# Patient Record
Sex: FEMALE | Race: BLACK OR AFRICAN AMERICAN | NOT HISPANIC OR LATINO | Employment: FULL TIME | URBAN - METROPOLITAN AREA
[De-identification: names, ages, dates, MRNs, and addresses within clinical notes are randomized per-mention and may not be internally consistent; named-entity substitution may affect disease eponyms.]

---

## 2017-01-01 ENCOUNTER — HOSPITAL ENCOUNTER (INPATIENT)
Facility: HOSPITAL | Age: 82
LOS: 9 days | DRG: 388 | End: 2017-06-20
Attending: EMERGENCY MEDICINE
Payer: MEDICARE

## 2017-01-01 VITALS
SYSTOLIC BLOOD PRESSURE: 136 MMHG | WEIGHT: 158.75 LBS | HEIGHT: 64 IN | HEART RATE: 82 BPM | RESPIRATION RATE: 25 BRPM | BODY MASS INDEX: 27.1 KG/M2 | OXYGEN SATURATION: 98 % | DIASTOLIC BLOOD PRESSURE: 63 MMHG | TEMPERATURE: 100 F

## 2017-01-01 DIAGNOSIS — R06.03 RESPIRATORY DISTRESS: ICD-10-CM

## 2017-01-01 DIAGNOSIS — R78.81 BACTEREMIA: ICD-10-CM

## 2017-01-01 DIAGNOSIS — N17.9 ACUTE RENAL FAILURE, UNSPECIFIED ACUTE RENAL FAILURE TYPE: ICD-10-CM

## 2017-01-01 DIAGNOSIS — I48.91 ATRIAL FIBRILLATION, NEW ONSET: ICD-10-CM

## 2017-01-01 DIAGNOSIS — R00.0 TACHYCARDIA: Primary | ICD-10-CM

## 2017-01-01 DIAGNOSIS — R52 PAIN, ACUTE: ICD-10-CM

## 2017-01-01 DIAGNOSIS — I50.9 HEART FAILURE: ICD-10-CM

## 2017-01-01 DIAGNOSIS — K56.609 SMALL BOWEL OBSTRUCTION: ICD-10-CM

## 2017-01-01 DIAGNOSIS — I63.9 CVA (CEREBRAL VASCULAR ACCIDENT): ICD-10-CM

## 2017-01-01 DIAGNOSIS — I50.30 (HFPEF) HEART FAILURE WITH PRESERVED EJECTION FRACTION: ICD-10-CM

## 2017-01-01 LAB
ALBUMIN SERPL BCP-MCNC: 1.9 G/DL
ALBUMIN SERPL BCP-MCNC: 1.9 G/DL
ALBUMIN SERPL BCP-MCNC: 2 G/DL
ALBUMIN SERPL BCP-MCNC: 2.1 G/DL
ALBUMIN SERPL BCP-MCNC: 2.1 G/DL
ALBUMIN SERPL BCP-MCNC: 2.3 G/DL
ALP SERPL-CCNC: 59 U/L
ALP SERPL-CCNC: 65 U/L
ALP SERPL-CCNC: 65 U/L
ALP SERPL-CCNC: 83 U/L
ALT SERPL W/O P-5'-P-CCNC: 13 U/L
ALT SERPL W/O P-5'-P-CCNC: 18 U/L
ALT SERPL W/O P-5'-P-CCNC: 9 U/L
ALT SERPL W/O P-5'-P-CCNC: 9 U/L
ANION GAP SERPL CALC-SCNC: 10 MMOL/L
ANION GAP SERPL CALC-SCNC: 11 MMOL/L
ANION GAP SERPL CALC-SCNC: 13 MMOL/L
ANION GAP SERPL CALC-SCNC: 15 MMOL/L
ANION GAP SERPL CALC-SCNC: 15 MMOL/L
ANION GAP SERPL CALC-SCNC: 18 MMOL/L
ANION GAP SERPL CALC-SCNC: 6 MMOL/L
ANION GAP SERPL CALC-SCNC: 7 MMOL/L
ANION GAP SERPL CALC-SCNC: 9 MMOL/L
ANION GAP SERPL CALC-SCNC: 9 MMOL/L
ANISOCYTOSIS BLD QL SMEAR: SLIGHT
AORTIC VALVE STENOSIS: ABNORMAL
AST SERPL-CCNC: 33 U/L
AST SERPL-CCNC: 33 U/L
AST SERPL-CCNC: 50 U/L
AST SERPL-CCNC: 73 U/L
BACTERIA #/AREA URNS HPF: ABNORMAL /HPF
BACTERIA BLD CULT: NORMAL
BACTERIA CATH TIP CULT: NO GROWTH
BACTERIA UR CULT: NO GROWTH
BASOPHILS # BLD AUTO: 0.01 K/UL
BASOPHILS # BLD AUTO: 0.03 K/UL
BASOPHILS # BLD AUTO: 0.04 K/UL
BASOPHILS # BLD AUTO: 0.05 K/UL
BASOPHILS NFR BLD: 0.1 %
BASOPHILS NFR BLD: 0.3 %
BASOPHILS NFR BLD: 0.4 %
BASOPHILS NFR BLD: 0.4 %
BILIRUB SERPL-MCNC: 0.4 MG/DL
BILIRUB SERPL-MCNC: 0.5 MG/DL
BILIRUB UR QL STRIP: ABNORMAL
BUN SERPL-MCNC: 26 MG/DL
BUN SERPL-MCNC: 26 MG/DL
BUN SERPL-MCNC: 30 MG/DL
BUN SERPL-MCNC: 32 MG/DL
BUN SERPL-MCNC: 32 MG/DL
BUN SERPL-MCNC: 35 MG/DL
BUN SERPL-MCNC: 35 MG/DL
BUN SERPL-MCNC: 39 MG/DL
BUN SERPL-MCNC: 44 MG/DL
BUN SERPL-MCNC: 48 MG/DL
BUN SERPL-MCNC: 50 MG/DL
BUN SERPL-MCNC: 50 MG/DL
CALCIUM SERPL-MCNC: 7.8 MG/DL
CALCIUM SERPL-MCNC: 8 MG/DL
CALCIUM SERPL-MCNC: 8.4 MG/DL
CALCIUM SERPL-MCNC: 8.7 MG/DL
CALCIUM SERPL-MCNC: 8.9 MG/DL
CALCIUM SERPL-MCNC: 8.9 MG/DL
CALCIUM SERPL-MCNC: 9.1 MG/DL
CALCIUM SERPL-MCNC: 9.6 MG/DL
CHLORIDE SERPL-SCNC: 111 MMOL/L
CHLORIDE SERPL-SCNC: 111 MMOL/L
CHLORIDE SERPL-SCNC: 112 MMOL/L
CHLORIDE SERPL-SCNC: 115 MMOL/L
CHLORIDE SERPL-SCNC: 116 MMOL/L
CHLORIDE SERPL-SCNC: 117 MMOL/L
CHLORIDE SERPL-SCNC: 117 MMOL/L
CHLORIDE SERPL-SCNC: 118 MMOL/L
CHLORIDE SERPL-SCNC: 120 MMOL/L
CHLORIDE SERPL-SCNC: 123 MMOL/L
CHOLEST/HDLC SERPL: 8.7 {RATIO}
CLARITY UR: ABNORMAL
CO2 SERPL-SCNC: 13 MMOL/L
CO2 SERPL-SCNC: 14 MMOL/L
CO2 SERPL-SCNC: 17 MMOL/L
CO2 SERPL-SCNC: 19 MMOL/L
CO2 SERPL-SCNC: 19 MMOL/L
CO2 SERPL-SCNC: 21 MMOL/L
CO2 SERPL-SCNC: 21 MMOL/L
CO2 SERPL-SCNC: 22 MMOL/L
CO2 SERPL-SCNC: 22 MMOL/L
CO2 SERPL-SCNC: 26 MMOL/L
COLOR UR: ABNORMAL
CREAT SERPL-MCNC: 1.1 MG/DL
CREAT SERPL-MCNC: 1.3 MG/DL
CREAT SERPL-MCNC: 1.5 MG/DL
CREAT SERPL-MCNC: 1.6 MG/DL
CREAT SERPL-MCNC: 1.9 MG/DL
CREAT SERPL-MCNC: 2.5 MG/DL
CREAT SERPL-MCNC: 3.9 MG/DL
CREAT SERPL-MCNC: 3.9 MG/DL
CREAT SERPL-MCNC: 4 MG/DL
CRP SERPL-MCNC: 114.2 MG/L
DIASTOLIC DYSFUNCTION: NO
DIFFERENTIAL METHOD: ABNORMAL
EOSINOPHIL # BLD AUTO: 0.9 K/UL
EOSINOPHIL # BLD AUTO: 0.9 K/UL
EOSINOPHIL # BLD AUTO: 1 K/UL
EOSINOPHIL # BLD AUTO: 1.4 K/UL
EOSINOPHIL NFR BLD: 10.1 %
EOSINOPHIL NFR BLD: 15.2 %
EOSINOPHIL NFR BLD: 8.7 %
EOSINOPHIL NFR BLD: 8.7 %
ERYTHROCYTE [DISTWIDTH] IN BLOOD BY AUTOMATED COUNT: 16.4 %
ERYTHROCYTE [DISTWIDTH] IN BLOOD BY AUTOMATED COUNT: 16.6 %
ERYTHROCYTE [DISTWIDTH] IN BLOOD BY AUTOMATED COUNT: 16.7 %
ERYTHROCYTE [DISTWIDTH] IN BLOOD BY AUTOMATED COUNT: 17.1 %
ERYTHROCYTE [SEDIMENTATION RATE] IN BLOOD BY WESTERGREN METHOD: 49 MM/HR
EST. GFR  (AFRICAN AMERICAN): 11 ML/MIN/1.73 M^2
EST. GFR  (AFRICAN AMERICAN): 19 ML/MIN/1.73 M^2
EST. GFR  (AFRICAN AMERICAN): 27 ML/MIN/1.73 M^2
EST. GFR  (AFRICAN AMERICAN): 33 ML/MIN/1.73 M^2
EST. GFR  (AFRICAN AMERICAN): 36 ML/MIN/1.73 M^2
EST. GFR  (AFRICAN AMERICAN): 43 ML/MIN/1.73 M^2
EST. GFR  (AFRICAN AMERICAN): 53 ML/MIN/1.73 M^2
EST. GFR  (NON AFRICAN AMERICAN): 10 ML/MIN/1.73 M^2
EST. GFR  (NON AFRICAN AMERICAN): 17 ML/MIN/1.73 M^2
EST. GFR  (NON AFRICAN AMERICAN): 24 ML/MIN/1.73 M^2
EST. GFR  (NON AFRICAN AMERICAN): 29 ML/MIN/1.73 M^2
EST. GFR  (NON AFRICAN AMERICAN): 31 ML/MIN/1.73 M^2
EST. GFR  (NON AFRICAN AMERICAN): 37 ML/MIN/1.73 M^2
EST. GFR  (NON AFRICAN AMERICAN): 46 ML/MIN/1.73 M^2
ESTIMATED PA SYSTOLIC PRESSURE: 32.72
FERRITIN SERPL-MCNC: 182 NG/ML
FOLATE SERPL-MCNC: 17.9 NG/ML
GLOBAL PERICARDIAL EFFUSION: ABNORMAL
GLUCOSE SERPL-MCNC: 100 MG/DL
GLUCOSE SERPL-MCNC: 101 MG/DL
GLUCOSE SERPL-MCNC: 101 MG/DL
GLUCOSE SERPL-MCNC: 120 MG/DL
GLUCOSE SERPL-MCNC: 148 MG/DL
GLUCOSE SERPL-MCNC: 148 MG/DL
GLUCOSE SERPL-MCNC: 58 MG/DL
GLUCOSE SERPL-MCNC: 66 MG/DL
GLUCOSE SERPL-MCNC: 73 MG/DL
GLUCOSE SERPL-MCNC: 93 MG/DL
GLUCOSE SERPL-MCNC: 97 MG/DL
GLUCOSE SERPL-MCNC: 98 MG/DL
GLUCOSE UR QL STRIP: NEGATIVE
HCT VFR BLD AUTO: 27.4 %
HCT VFR BLD AUTO: 28 %
HCT VFR BLD AUTO: 30.1 %
HCT VFR BLD AUTO: 34.5 %
HDL/CHOLESTEROL RATIO: 11.5 %
HDLC SERPL-MCNC: 113 MG/DL
HDLC SERPL-MCNC: 13 MG/DL
HGB BLD-MCNC: 10.9 G/DL
HGB BLD-MCNC: 8.6 G/DL
HGB BLD-MCNC: 8.7 G/DL
HGB BLD-MCNC: 9.6 G/DL
HGB UR QL STRIP: ABNORMAL
HYALINE CASTS #/AREA URNS LPF: 0 /LPF
HYPOCHROMIA BLD QL SMEAR: ABNORMAL
IRON SERPL-MCNC: 45 UG/DL
KETONES UR QL STRIP: ABNORMAL
LACTATE SERPL-SCNC: 1.7 MMOL/L
LDLC SERPL CALC-MCNC: 61.8 MG/DL
LEUKOCYTE ESTERASE UR QL STRIP: ABNORMAL
LYMPHOCYTES # BLD AUTO: 1.1 K/UL
LYMPHOCYTES # BLD AUTO: 1.7 K/UL
LYMPHOCYTES # BLD AUTO: 1.9 K/UL
LYMPHOCYTES # BLD AUTO: 2.4 K/UL
LYMPHOCYTES NFR BLD: 12.3 %
LYMPHOCYTES NFR BLD: 17.8 %
LYMPHOCYTES NFR BLD: 19.2 %
LYMPHOCYTES NFR BLD: 20.9 %
MAGNESIUM SERPL-MCNC: 1 MG/DL
MAGNESIUM SERPL-MCNC: 1.4 MG/DL
MAGNESIUM SERPL-MCNC: 1.6 MG/DL
MAGNESIUM SERPL-MCNC: 1.7 MG/DL
MAGNESIUM SERPL-MCNC: 1.8 MG/DL
MCH RBC QN AUTO: 22.5 PG
MCH RBC QN AUTO: 22.9 PG
MCH RBC QN AUTO: 23.1 PG
MCH RBC QN AUTO: 23.2 PG
MCHC RBC AUTO-ENTMCNC: 30.7 %
MCHC RBC AUTO-ENTMCNC: 31.6 %
MCHC RBC AUTO-ENTMCNC: 31.8 %
MCHC RBC AUTO-ENTMCNC: 31.9 %
MCV RBC AUTO: 73 FL
MICROSCOPIC COMMENT: ABNORMAL
MITRAL VALVE MOBILITY: NORMAL
MITRAL VALVE REGURGITATION: ABNORMAL
MONOCYTES # BLD AUTO: 1.1 K/UL
MONOCYTES # BLD AUTO: 1.2 K/UL
MONOCYTES # BLD AUTO: 1.3 K/UL
MONOCYTES # BLD AUTO: 1.4 K/UL
MONOCYTES NFR BLD: 10.7 %
MONOCYTES NFR BLD: 11.2 %
MONOCYTES NFR BLD: 11.6 %
MONOCYTES NFR BLD: 14.8 %
NEUTROPHILS # BLD AUTO: 5.3 K/UL
NEUTROPHILS # BLD AUTO: 5.3 K/UL
NEUTROPHILS # BLD AUTO: 6.4 K/UL
NEUTROPHILS # BLD AUTO: 6.9 K/UL
NEUTROPHILS NFR BLD: 57.6 %
NEUTROPHILS NFR BLD: 58.5 %
NEUTROPHILS NFR BLD: 59.7 %
NEUTROPHILS NFR BLD: 61.8 %
NITRITE UR QL STRIP: NEGATIVE
NON-SQ EPI CELLS #/AREA URNS HPF: 1 /HPF
NONHDLC SERPL-MCNC: 100 MG/DL
OVALOCYTES BLD QL SMEAR: ABNORMAL
PH UR STRIP: 5 [PH] (ref 5–8)
PHOSPHATE SERPL-MCNC: 3.6 MG/DL
PHOSPHATE SERPL-MCNC: 4.2 MG/DL
PHOSPHATE SERPL-MCNC: 4.4 MG/DL
PHOSPHATE SERPL-MCNC: 4.8 MG/DL
PHOSPHATE SERPL-MCNC: 5.4 MG/DL
PLATELET # BLD AUTO: 239 K/UL
PLATELET # BLD AUTO: 250 K/UL
PLATELET # BLD AUTO: 323 K/UL
PLATELET # BLD AUTO: ABNORMAL K/UL
PMV BLD AUTO: 11.1 FL
PMV BLD AUTO: 12.4 FL
PMV BLD AUTO: 12.9 FL
PMV BLD AUTO: ABNORMAL FL
POCT GLUCOSE: 117 MG/DL (ref 70–110)
POCT GLUCOSE: 128 MG/DL (ref 70–110)
POCT GLUCOSE: 131 MG/DL (ref 70–110)
POCT GLUCOSE: 179 MG/DL (ref 70–110)
POCT GLUCOSE: 82 MG/DL (ref 70–110)
POCT GLUCOSE: 83 MG/DL (ref 70–110)
POCT GLUCOSE: 95 MG/DL (ref 70–110)
POCT GLUCOSE: 95 MG/DL (ref 70–110)
POIKILOCYTOSIS BLD QL SMEAR: SLIGHT
POLYCHROMASIA BLD QL SMEAR: ABNORMAL
POTASSIUM SERPL-SCNC: 2.9 MMOL/L
POTASSIUM SERPL-SCNC: 3.4 MMOL/L
POTASSIUM SERPL-SCNC: 3.5 MMOL/L
POTASSIUM SERPL-SCNC: 3.5 MMOL/L
POTASSIUM SERPL-SCNC: 3.7 MMOL/L
POTASSIUM SERPL-SCNC: 3.7 MMOL/L
POTASSIUM SERPL-SCNC: 4 MMOL/L
POTASSIUM SERPL-SCNC: 4.2 MMOL/L
POTASSIUM SERPL-SCNC: 4.2 MMOL/L
POTASSIUM SERPL-SCNC: 4.3 MMOL/L
POTASSIUM UR-SCNC: 78 MMOL/L
PROT SERPL-MCNC: 4.7 G/DL
PROT SERPL-MCNC: 5.3 G/DL
PROT SERPL-MCNC: 5.3 G/DL
PROT SERPL-MCNC: 6.3 G/DL
PROT UR QL STRIP: ABNORMAL
RBC # BLD AUTO: 3.77 M/UL
RBC # BLD AUTO: 3.83 M/UL
RBC # BLD AUTO: 4.14 M/UL
RBC # BLD AUTO: 4.75 M/UL
RBC #/AREA URNS HPF: >100 /HPF (ref 0–4)
RETICS/RBC NFR AUTO: 1 %
RETIRED EF AND QEF - SEE NOTES: 25 (ref 55–65)
RETIRED EF AND QEF - SEE NOTES: 55 (ref 55–65)
RPR SER QL: NORMAL
SATURATED IRON: 26 %
SODIUM SERPL-SCNC: 142 MMOL/L
SODIUM SERPL-SCNC: 142 MMOL/L
SODIUM SERPL-SCNC: 143 MMOL/L
SODIUM SERPL-SCNC: 143 MMOL/L
SODIUM SERPL-SCNC: 144 MMOL/L
SODIUM SERPL-SCNC: 145 MMOL/L
SODIUM SERPL-SCNC: 145 MMOL/L
SODIUM SERPL-SCNC: 147 MMOL/L
SODIUM SERPL-SCNC: 148 MMOL/L
SODIUM SERPL-SCNC: 151 MMOL/L
SODIUM SERPL-SCNC: 151 MMOL/L
SODIUM SERPL-SCNC: 154 MMOL/L
SODIUM UR-SCNC: 23 MMOL/L
SP GR UR STRIP: 1.02 (ref 1–1.03)
SQUAMOUS #/AREA URNS HPF: 5 /HPF
T4 FREE SERPL-MCNC: 0.8 NG/DL
TARGETS BLD QL SMEAR: ABNORMAL
TOTAL IRON BINDING CAPACITY: 172 UG/DL
TRANSFERRIN SERPL-MCNC: 116 MG/DL
TRANSFERRIN SERPL-MCNC: 116 MG/DL
TRICUSPID VALVE REGURGITATION: ABNORMAL
TRIGL SERPL-MCNC: 191 MG/DL
TROPONIN I SERPL DL<=0.01 NG/ML-MCNC: 0.05 NG/ML
TROPONIN I SERPL DL<=0.01 NG/ML-MCNC: 0.06 NG/ML
TSH SERPL DL<=0.005 MIU/L-ACNC: 7.48 UIU/ML
URN SPEC COLLECT METH UR: ABNORMAL
UROBILINOGEN UR STRIP-ACNC: ABNORMAL EU/DL
VANCOMYCIN SERPL-MCNC: 14.5 UG/ML
VANCOMYCIN SERPL-MCNC: 16.2 UG/ML
VANCOMYCIN SERPL-MCNC: 17.1 UG/ML
VANCOMYCIN SERPL-MCNC: 23.3 UG/ML
VIT B12 SERPL-MCNC: 888 PG/ML
WBC # BLD AUTO: 10.39 K/UL
WBC # BLD AUTO: 11.7 K/UL
WBC # BLD AUTO: 9.02 K/UL
WBC # BLD AUTO: 9.27 K/UL
WBC #/AREA URNS HPF: 25 /HPF (ref 0–5)

## 2017-01-01 PROCEDURE — 99222 1ST HOSP IP/OBS MODERATE 55: CPT | Mod: ,,, | Performed by: INTERNAL MEDICINE

## 2017-01-01 PROCEDURE — 25000003 PHARM REV CODE 250: Performed by: INTERNAL MEDICINE

## 2017-01-01 PROCEDURE — 80053 COMPREHEN METABOLIC PANEL: CPT

## 2017-01-01 PROCEDURE — 84439 ASSAY OF FREE THYROXINE: CPT

## 2017-01-01 PROCEDURE — 80061 LIPID PANEL: CPT

## 2017-01-01 PROCEDURE — 97530 THERAPEUTIC ACTIVITIES: CPT

## 2017-01-01 PROCEDURE — 94761 N-INVAS EAR/PLS OXIMETRY MLT: CPT

## 2017-01-01 PROCEDURE — 86592 SYPHILIS TEST NON-TREP QUAL: CPT

## 2017-01-01 PROCEDURE — 97110 THERAPEUTIC EXERCISES: CPT

## 2017-01-01 PROCEDURE — 84100 ASSAY OF PHOSPHORUS: CPT

## 2017-01-01 PROCEDURE — 92526 ORAL FUNCTION THERAPY: CPT

## 2017-01-01 PROCEDURE — G8987 SELF CARE CURRENT STATUS: HCPCS | Mod: CL

## 2017-01-01 PROCEDURE — 97165 OT EVAL LOW COMPLEX 30 MIN: CPT

## 2017-01-01 PROCEDURE — 94640 AIRWAY INHALATION TREATMENT: CPT

## 2017-01-01 PROCEDURE — 99233 SBSQ HOSP IP/OBS HIGH 50: CPT | Mod: ,,, | Performed by: INTERNAL MEDICINE

## 2017-01-01 PROCEDURE — 80069 RENAL FUNCTION PANEL: CPT

## 2017-01-01 PROCEDURE — 63600175 PHARM REV CODE 636 W HCPCS: Performed by: INTERNAL MEDICINE

## 2017-01-01 PROCEDURE — 27000221 HC OXYGEN, UP TO 24 HOURS

## 2017-01-01 PROCEDURE — 21400001 HC TELEMETRY ROOM

## 2017-01-01 PROCEDURE — 25000003 PHARM REV CODE 250: Performed by: HOSPITALIST

## 2017-01-01 PROCEDURE — 86140 C-REACTIVE PROTEIN: CPT

## 2017-01-01 PROCEDURE — 25000003 PHARM REV CODE 250: Performed by: EMERGENCY MEDICINE

## 2017-01-01 PROCEDURE — 87040 BLOOD CULTURE FOR BACTERIA: CPT

## 2017-01-01 PROCEDURE — 83735 ASSAY OF MAGNESIUM: CPT

## 2017-01-01 PROCEDURE — 63600175 PHARM REV CODE 636 W HCPCS: Performed by: HOSPITALIST

## 2017-01-01 PROCEDURE — 25000242 PHARM REV CODE 250 ALT 637 W/ HCPCS: Performed by: INTERNAL MEDICINE

## 2017-01-01 PROCEDURE — 82746 ASSAY OF FOLIC ACID SERUM: CPT

## 2017-01-01 PROCEDURE — G8988 SELF CARE GOAL STATUS: HCPCS | Mod: CK

## 2017-01-01 PROCEDURE — 31500 INSERT EMERGENCY AIRWAY: CPT

## 2017-01-01 PROCEDURE — 87070 CULTURE OTHR SPECIMN AEROBIC: CPT

## 2017-01-01 PROCEDURE — G8996 SWALLOW CURRENT STATUS: HCPCS | Mod: CK

## 2017-01-01 PROCEDURE — G8989 SELF CARE D/C STATUS: HCPCS | Mod: CL

## 2017-01-01 PROCEDURE — 80202 ASSAY OF VANCOMYCIN: CPT

## 2017-01-01 PROCEDURE — 84484 ASSAY OF TROPONIN QUANT: CPT

## 2017-01-01 PROCEDURE — 96361 HYDRATE IV INFUSION ADD-ON: CPT

## 2017-01-01 PROCEDURE — 36415 COLL VENOUS BLD VENIPUNCTURE: CPT

## 2017-01-01 PROCEDURE — 97535 SELF CARE MNGMENT TRAINING: CPT

## 2017-01-01 PROCEDURE — 80048 BASIC METABOLIC PNL TOTAL CA: CPT

## 2017-01-01 PROCEDURE — 85025 COMPLETE CBC W/AUTO DIFF WBC: CPT

## 2017-01-01 PROCEDURE — 86580 TB INTRADERMAL TEST: CPT | Performed by: INTERNAL MEDICINE

## 2017-01-01 PROCEDURE — 83605 ASSAY OF LACTIC ACID: CPT

## 2017-01-01 PROCEDURE — 81000 URINALYSIS NONAUTO W/SCOPE: CPT

## 2017-01-01 PROCEDURE — 84484 ASSAY OF TROPONIN QUANT: CPT | Mod: 91

## 2017-01-01 PROCEDURE — 82728 ASSAY OF FERRITIN: CPT

## 2017-01-01 PROCEDURE — G8997 SWALLOW GOAL STATUS: HCPCS | Mod: CI

## 2017-01-01 PROCEDURE — 87086 URINE CULTURE/COLONY COUNT: CPT

## 2017-01-01 PROCEDURE — 85651 RBC SED RATE NONAUTOMATED: CPT

## 2017-01-01 PROCEDURE — 93307 TTE W/O DOPPLER COMPLETE: CPT

## 2017-01-01 PROCEDURE — 93005 ELECTROCARDIOGRAM TRACING: CPT

## 2017-01-01 PROCEDURE — 0BH18EZ INSERTION OF ENDOTRACHEAL AIRWAY INTO TRACHEA, VIA NATURAL OR ARTIFICIAL OPENING ENDOSCOPIC: ICD-10-PCS | Performed by: INTERNAL MEDICINE

## 2017-01-01 PROCEDURE — 94799 UNLISTED PULMONARY SVC/PX: CPT

## 2017-01-01 PROCEDURE — 85045 AUTOMATED RETICULOCYTE COUNT: CPT

## 2017-01-01 PROCEDURE — 84443 ASSAY THYROID STIM HORMONE: CPT

## 2017-01-01 PROCEDURE — 84133 ASSAY OF URINE POTASSIUM: CPT

## 2017-01-01 PROCEDURE — 25000242 PHARM REV CODE 250 ALT 637 W/ HCPCS: Performed by: HOSPITALIST

## 2017-01-01 PROCEDURE — 96360 HYDRATION IV INFUSION INIT: CPT

## 2017-01-01 PROCEDURE — 87077 CULTURE AEROBIC IDENTIFY: CPT

## 2017-01-01 PROCEDURE — 99232 SBSQ HOSP IP/OBS MODERATE 35: CPT | Mod: ,,, | Performed by: PSYCHIATRY & NEUROLOGY

## 2017-01-01 PROCEDURE — 99222 1ST HOSP IP/OBS MODERATE 55: CPT | Mod: ,,, | Performed by: PSYCHIATRY & NEUROLOGY

## 2017-01-01 PROCEDURE — 92610 EVALUATE SWALLOWING FUNCTION: CPT

## 2017-01-01 PROCEDURE — 82607 VITAMIN B-12: CPT

## 2017-01-01 PROCEDURE — 99232 SBSQ HOSP IP/OBS MODERATE 35: CPT | Mod: ,,, | Performed by: INTERNAL MEDICINE

## 2017-01-01 PROCEDURE — 84300 ASSAY OF URINE SODIUM: CPT

## 2017-01-01 PROCEDURE — G8978 MOBILITY CURRENT STATUS: HCPCS | Mod: CM

## 2017-01-01 PROCEDURE — 83540 ASSAY OF IRON: CPT

## 2017-01-01 PROCEDURE — 99285 EMERGENCY DEPT VISIT HI MDM: CPT | Mod: 25

## 2017-01-01 PROCEDURE — G8979 MOBILITY GOAL STATUS: HCPCS | Mod: CK

## 2017-01-01 PROCEDURE — 93307 TTE W/O DOPPLER COMPLETE: CPT | Mod: 26,,, | Performed by: INTERNAL MEDICINE

## 2017-01-01 PROCEDURE — 93306 TTE W/DOPPLER COMPLETE: CPT

## 2017-01-01 PROCEDURE — 93306 TTE W/DOPPLER COMPLETE: CPT | Mod: 26,,, | Performed by: INTERNAL MEDICINE

## 2017-01-01 PROCEDURE — 87186 SC STD MICRODIL/AGAR DIL: CPT | Mod: 59

## 2017-01-01 PROCEDURE — 97161 PT EVAL LOW COMPLEX 20 MIN: CPT

## 2017-01-01 PROCEDURE — 5A12012 PERFORMANCE OF CARDIAC OUTPUT, SINGLE, MANUAL: ICD-10-PCS | Performed by: INTERNAL MEDICINE

## 2017-01-01 RX ORDER — ENOXAPARIN SODIUM 100 MG/ML
30 INJECTION SUBCUTANEOUS EVERY 24 HOURS
Status: DISCONTINUED | OUTPATIENT
Start: 2017-01-01 | End: 2017-01-01

## 2017-01-01 RX ORDER — SODIUM CHLORIDE 9 MG/ML
INJECTION, SOLUTION INTRAVENOUS CONTINUOUS
Status: DISCONTINUED | OUTPATIENT
Start: 2017-01-01 | End: 2017-01-01

## 2017-01-01 RX ORDER — METOPROLOL TARTRATE 1 MG/ML
5 INJECTION, SOLUTION INTRAVENOUS EVERY 5 MIN PRN
Status: DISCONTINUED | OUTPATIENT
Start: 2017-01-01 | End: 2017-01-01

## 2017-01-01 RX ORDER — ACETAMINOPHEN 325 MG/1
650 TABLET ORAL EVERY 8 HOURS PRN
Status: DISCONTINUED | OUTPATIENT
Start: 2017-01-01 | End: 2017-01-01

## 2017-01-01 RX ORDER — EPINEPHRINE 1 MG/ML
INJECTION INTRAMUSCULAR; INTRAVENOUS; SUBCUTANEOUS CODE/TRAUMA/SEDATION MEDICATION
Status: COMPLETED | OUTPATIENT
Start: 2017-01-01 | End: 2017-01-01

## 2017-01-01 RX ORDER — CIPROFLOXACIN 2 MG/ML
400 INJECTION, SOLUTION INTRAVENOUS
Status: DISCONTINUED | OUTPATIENT
Start: 2017-01-01 | End: 2017-01-01

## 2017-01-01 RX ORDER — NAPROXEN SODIUM 220 MG/1
81 TABLET, FILM COATED ORAL DAILY
Status: DISCONTINUED | OUTPATIENT
Start: 2017-01-01 | End: 2017-01-01 | Stop reason: HOSPADM

## 2017-01-01 RX ORDER — MICONAZOLE NITRATE 2 %
POWDER (GRAM) TOPICAL 2 TIMES DAILY
Status: DISCONTINUED | OUTPATIENT
Start: 2017-01-01 | End: 2017-01-01 | Stop reason: HOSPADM

## 2017-01-01 RX ORDER — LEVOTHYROXINE SODIUM 25 UG/1
25 TABLET ORAL
Status: DISCONTINUED | OUTPATIENT
Start: 2017-01-01 | End: 2017-01-01 | Stop reason: HOSPADM

## 2017-01-01 RX ORDER — LEVOTHYROXINE SODIUM ANHYDROUS 100 UG/5ML
12.5 INJECTION, POWDER, LYOPHILIZED, FOR SOLUTION INTRAVENOUS DAILY
Status: DISCONTINUED | OUTPATIENT
Start: 2017-01-01 | End: 2017-01-01

## 2017-01-01 RX ORDER — MAGNESIUM SULFATE HEPTAHYDRATE 40 MG/ML
2 INJECTION, SOLUTION INTRAVENOUS ONCE
Status: COMPLETED | OUTPATIENT
Start: 2017-01-01 | End: 2017-01-01

## 2017-01-01 RX ORDER — DEXTROSE MONOHYDRATE AND SODIUM CHLORIDE 5; .45 G/100ML; G/100ML
INJECTION, SOLUTION INTRAVENOUS CONTINUOUS
Status: DISCONTINUED | OUTPATIENT
Start: 2017-01-01 | End: 2017-01-01

## 2017-01-01 RX ORDER — POTASSIUM CHLORIDE 7.45 MG/ML
10 INJECTION INTRAVENOUS ONCE
Status: COMPLETED | OUTPATIENT
Start: 2017-01-01 | End: 2017-01-01

## 2017-01-01 RX ORDER — LEVOTHYROXINE SODIUM 25 UG/1
25 TABLET ORAL
Status: DISCONTINUED | OUTPATIENT
Start: 2017-01-01 | End: 2017-01-01

## 2017-01-01 RX ORDER — ONDANSETRON 2 MG/ML
4 INJECTION INTRAMUSCULAR; INTRAVENOUS EVERY 12 HOURS PRN
Status: DISCONTINUED | OUTPATIENT
Start: 2017-01-01 | End: 2017-01-01 | Stop reason: HOSPADM

## 2017-01-01 RX ORDER — ATORVASTATIN CALCIUM 10 MG/1
10 TABLET, FILM COATED ORAL DAILY
Status: DISCONTINUED | OUTPATIENT
Start: 2017-01-01 | End: 2017-01-01 | Stop reason: HOSPADM

## 2017-01-01 RX ORDER — POTASSIUM CHLORIDE 750 MG/1
10 CAPSULE, EXTENDED RELEASE ORAL DAILY
COMMUNITY

## 2017-01-01 RX ORDER — HYDRALAZINE HYDROCHLORIDE 20 MG/ML
10 INJECTION INTRAMUSCULAR; INTRAVENOUS EVERY 4 HOURS PRN
Status: DISCONTINUED | OUTPATIENT
Start: 2017-01-01 | End: 2017-01-01 | Stop reason: HOSPADM

## 2017-01-01 RX ORDER — DEXTROSE MONOHYDRATE 50 MG/ML
INJECTION, SOLUTION INTRAVENOUS CONTINUOUS
Status: DISCONTINUED | OUTPATIENT
Start: 2017-01-01 | End: 2017-01-01

## 2017-01-01 RX ORDER — METRONIDAZOLE 500 MG/100ML
500 INJECTION, SOLUTION INTRAVENOUS
Status: DISCONTINUED | OUTPATIENT
Start: 2017-01-01 | End: 2017-01-01

## 2017-01-01 RX ORDER — SODIUM CHLORIDE 9 MG/ML
INJECTION, SOLUTION INTRAVENOUS
Status: COMPLETED | OUTPATIENT
Start: 2017-01-01 | End: 2017-01-01

## 2017-01-01 RX ORDER — ENOXAPARIN SODIUM 100 MG/ML
30 INJECTION SUBCUTANEOUS EVERY 24 HOURS
Status: DISCONTINUED | OUTPATIENT
Start: 2017-01-01 | End: 2017-01-01 | Stop reason: HOSPADM

## 2017-01-01 RX ORDER — SODIUM BICARBONATE 1 MEQ/ML
SYRINGE (ML) INTRAVENOUS CODE/TRAUMA/SEDATION MEDICATION
Status: COMPLETED | OUTPATIENT
Start: 2017-01-01 | End: 2017-01-01

## 2017-01-01 RX ORDER — ATORVASTATIN CALCIUM 40 MG/1
40 TABLET, FILM COATED ORAL DAILY
Status: DISCONTINUED | OUTPATIENT
Start: 2017-01-01 | End: 2017-01-01

## 2017-01-01 RX ORDER — IPRATROPIUM BROMIDE AND ALBUTEROL SULFATE 2.5; .5 MG/3ML; MG/3ML
3 SOLUTION RESPIRATORY (INHALATION)
Status: DISCONTINUED | OUTPATIENT
Start: 2017-01-01 | End: 2017-01-01 | Stop reason: HOSPADM

## 2017-01-01 RX ORDER — METOPROLOL TARTRATE 1 MG/ML
5 INJECTION, SOLUTION INTRAVENOUS EVERY 5 MIN PRN
Status: DISCONTINUED | OUTPATIENT
Start: 2017-01-01 | End: 2017-01-01 | Stop reason: HOSPADM

## 2017-01-01 RX ORDER — DEXTROSE MONOHYDRATE 50 MG/ML
INJECTION, SOLUTION INTRAVENOUS CONTINUOUS
Status: DISCONTINUED | OUTPATIENT
Start: 2017-01-01 | End: 2017-01-01 | Stop reason: HOSPADM

## 2017-01-01 RX ORDER — ASPIRIN 300 MG/1
300 SUPPOSITORY RECTAL DAILY
Status: DISCONTINUED | OUTPATIENT
Start: 2017-01-01 | End: 2017-01-01

## 2017-01-01 RX ORDER — CIPROFLOXACIN 2 MG/ML
400 INJECTION, SOLUTION INTRAVENOUS ONCE
Status: COMPLETED | OUTPATIENT
Start: 2017-01-01 | End: 2017-01-01

## 2017-01-01 RX ORDER — MONTELUKAST SODIUM 10 MG/1
10 TABLET ORAL NIGHTLY
COMMUNITY

## 2017-01-01 RX ORDER — IPRATROPIUM BROMIDE AND ALBUTEROL SULFATE 2.5; .5 MG/3ML; MG/3ML
3 SOLUTION RESPIRATORY (INHALATION) EVERY 8 HOURS
Status: DISCONTINUED | OUTPATIENT
Start: 2017-01-01 | End: 2017-01-01

## 2017-01-01 RX ORDER — GLUCAGON 1 MG
1 KIT INJECTION
Status: DISCONTINUED | OUTPATIENT
Start: 2017-01-01 | End: 2017-01-01 | Stop reason: HOSPADM

## 2017-01-01 RX ORDER — TRIPROLIDINE/PSEUDOEPHEDRINE 2.5MG-60MG
400 TABLET ORAL EVERY 6 HOURS PRN
Status: DISCONTINUED | OUTPATIENT
Start: 2017-01-01 | End: 2017-01-01 | Stop reason: HOSPADM

## 2017-01-01 RX ORDER — METOPROLOL TARTRATE 1 MG/ML
5 INJECTION, SOLUTION INTRAVENOUS ONCE
Status: COMPLETED | OUTPATIENT
Start: 2017-01-01 | End: 2017-01-01

## 2017-01-01 RX ORDER — ACETAMINOPHEN 650 MG/1
650 SUPPOSITORY RECTAL EVERY 8 HOURS PRN
Status: DISCONTINUED | OUTPATIENT
Start: 2017-01-01 | End: 2017-01-01 | Stop reason: HOSPADM

## 2017-01-01 RX ORDER — RAMELTEON 8 MG/1
8 TABLET ORAL NIGHTLY
Status: DISCONTINUED | OUTPATIENT
Start: 2017-01-01 | End: 2017-01-01

## 2017-01-01 RX ORDER — IPRATROPIUM BROMIDE AND ALBUTEROL SULFATE 2.5; .5 MG/3ML; MG/3ML
3 SOLUTION RESPIRATORY (INHALATION) EVERY 6 HOURS
Status: DISCONTINUED | OUTPATIENT
Start: 2017-01-01 | End: 2017-01-01

## 2017-01-01 RX ORDER — ENOXAPARIN SODIUM 100 MG/ML
40 INJECTION SUBCUTANEOUS EVERY 24 HOURS
Status: DISCONTINUED | OUTPATIENT
Start: 2017-01-01 | End: 2017-01-01

## 2017-01-01 RX ORDER — PREDNISONE 1 MG/1
1 TABLET ORAL DAILY
COMMUNITY

## 2017-01-01 RX ORDER — ALPRAZOLAM 0.25 MG/1
0.25 TABLET ORAL 3 TIMES DAILY
COMMUNITY

## 2017-01-01 RX ORDER — QUETIAPINE FUMARATE 25 MG/1
25 TABLET, FILM COATED ORAL NIGHTLY
Status: DISCONTINUED | OUTPATIENT
Start: 2017-01-01 | End: 2017-01-01 | Stop reason: HOSPADM

## 2017-01-01 RX ADMIN — ACETAMINOPHEN 650 MG: 650 SUPPOSITORY RECTAL at 10:06

## 2017-01-01 RX ADMIN — ATORVASTATIN CALCIUM 10 MG: 10 TABLET, FILM COATED ORAL at 08:06

## 2017-01-01 RX ADMIN — IPRATROPIUM BROMIDE AND ALBUTEROL SULFATE 3 ML: .5; 3 SOLUTION RESPIRATORY (INHALATION) at 04:06

## 2017-01-01 RX ADMIN — IPRATROPIUM BROMIDE AND ALBUTEROL SULFATE 3 ML: .5; 3 SOLUTION RESPIRATORY (INHALATION) at 07:06

## 2017-01-01 RX ADMIN — IPRATROPIUM BROMIDE AND ALBUTEROL SULFATE 3 ML: .5; 3 SOLUTION RESPIRATORY (INHALATION) at 01:06

## 2017-01-01 RX ADMIN — METRONIDAZOLE 500 MG: 500 INJECTION, SOLUTION INTRAVENOUS at 06:06

## 2017-01-01 RX ADMIN — DEXTROSE AND SODIUM CHLORIDE: 5; .45 INJECTION, SOLUTION INTRAVENOUS at 11:06

## 2017-01-01 RX ADMIN — QUETIAPINE FUMARATE 25 MG: 25 TABLET, FILM COATED ORAL at 09:06

## 2017-01-01 RX ADMIN — Medication 5 UNITS: at 10:06

## 2017-01-01 RX ADMIN — EPINEPHRINE 1 MG: 1 INJECTION INTRAMUSCULAR; INTRAVENOUS; SUBCUTANEOUS at 08:06

## 2017-01-01 RX ADMIN — SODIUM CHLORIDE: 0.9 INJECTION, SOLUTION INTRAVENOUS at 10:06

## 2017-01-01 RX ADMIN — ACETAMINOPHEN 650 MG: 650 SUPPOSITORY RECTAL at 04:06

## 2017-01-01 RX ADMIN — DEXTROSE: 5 SOLUTION INTRAVENOUS at 10:06

## 2017-01-01 RX ADMIN — IPRATROPIUM BROMIDE AND ALBUTEROL SULFATE 3 ML: .5; 3 SOLUTION RESPIRATORY (INHALATION) at 03:06

## 2017-01-01 RX ADMIN — METRONIDAZOLE 500 MG: 500 INJECTION, SOLUTION INTRAVENOUS at 03:06

## 2017-01-01 RX ADMIN — ASPIRIN 81 MG 81 MG: 81 TABLET ORAL at 08:06

## 2017-01-01 RX ADMIN — LEVOTHYROXINE SODIUM 25 MCG: 25 TABLET ORAL at 06:06

## 2017-01-01 RX ADMIN — DEXTROSE MONOHYDRATE: 5 INJECTION, SOLUTION INTRAVENOUS at 06:06

## 2017-01-01 RX ADMIN — MAGNESIUM SULFATE IN WATER 2 G: 40 INJECTION, SOLUTION INTRAVENOUS at 08:06

## 2017-01-01 RX ADMIN — SODIUM CHLORIDE: 0.9 INJECTION, SOLUTION INTRAVENOUS at 09:06

## 2017-01-01 RX ADMIN — RETINOL, ERGOCALCIFEROL, .ALPHA.-TOCOPHEROL ACETATE, DL-, PHYTONADIONE, ASCORBIC ACID, NIACINAMIDE, RIBOFLAVIN 5-PHOSPHATE SODIUM, THIAMINE HYDROCHLORIDE, PYRIDOXINE HYDROCHLORIDE, DEXPANTHENOL, BIOTIN, FOLIC ACID, AND CYANOCOBALAMIN: KIT at 10:06

## 2017-01-01 RX ADMIN — METRONIDAZOLE 500 MG: 500 INJECTION, SOLUTION INTRAVENOUS at 11:06

## 2017-01-01 RX ADMIN — IPRATROPIUM BROMIDE AND ALBUTEROL SULFATE 3 ML: .5; 3 SOLUTION RESPIRATORY (INHALATION) at 08:06

## 2017-01-01 RX ADMIN — METOPROLOL TARTRATE 5 MG: 5 INJECTION INTRAVENOUS at 08:06

## 2017-01-01 RX ADMIN — ENOXAPARIN SODIUM 30 MG: 100 INJECTION SUBCUTANEOUS at 05:06

## 2017-01-01 RX ADMIN — CIPROFLOXACIN 400 MG: 2 INJECTION, SOLUTION INTRAVENOUS at 06:06

## 2017-01-01 RX ADMIN — SODIUM BICARBONATE: 84 INJECTION, SOLUTION INTRAVENOUS at 04:06

## 2017-01-01 RX ADMIN — DEXTROSE AND SODIUM CHLORIDE 500 ML: 5; .45 INJECTION, SOLUTION INTRAVENOUS at 06:06

## 2017-01-01 RX ADMIN — ASPIRIN 300 MG: 300 SUPPOSITORY RECTAL at 04:06

## 2017-01-01 RX ADMIN — ENOXAPARIN SODIUM 30 MG: 100 INJECTION SUBCUTANEOUS at 09:06

## 2017-01-01 RX ADMIN — VANCOMYCIN HYDROCHLORIDE 1000 MG: 1 INJECTION, POWDER, LYOPHILIZED, FOR SOLUTION INTRAVENOUS at 08:06

## 2017-01-01 RX ADMIN — MAGNESIUM SULFATE HEPTAHYDRATE 2 G: 40 INJECTION, SOLUTION INTRAVENOUS at 08:06

## 2017-01-01 RX ADMIN — SODIUM CHLORIDE, PRESERVATIVE FREE 500 ML: 5 INJECTION INTRAVENOUS at 05:06

## 2017-01-01 RX ADMIN — ACETAMINOPHEN 650 MG: 650 SUPPOSITORY RECTAL at 02:06

## 2017-01-01 RX ADMIN — LEVOTHYROXINE SODIUM 25 MCG: 25 TABLET ORAL at 05:06

## 2017-01-01 RX ADMIN — SODIUM CHLORIDE 1000 ML: 0.9 INJECTION, SOLUTION INTRAVENOUS at 08:06

## 2017-01-01 RX ADMIN — IPRATROPIUM BROMIDE AND ALBUTEROL SULFATE 3 ML: .5; 3 SOLUTION RESPIRATORY (INHALATION) at 12:06

## 2017-01-01 RX ADMIN — CIPROFLOXACIN 400 MG: 2 INJECTION, SOLUTION INTRAVENOUS at 02:06

## 2017-01-01 RX ADMIN — DEXTROSE MONOHYDRATE: 5 INJECTION, SOLUTION INTRAVENOUS at 08:06

## 2017-01-01 RX ADMIN — VANCOMYCIN HYDROCHLORIDE 1000 MG: 1 INJECTION, POWDER, LYOPHILIZED, FOR SOLUTION INTRAVENOUS at 11:06

## 2017-01-01 RX ADMIN — CIPROFLOXACIN 400 MG: 2 INJECTION, SOLUTION INTRAVENOUS at 09:06

## 2017-01-01 RX ADMIN — SODIUM CHLORIDE 1000 ML: 0.9 INJECTION, SOLUTION INTRAVENOUS at 05:06

## 2017-01-01 RX ADMIN — ASPIRIN 300 MG: 300 SUPPOSITORY RECTAL at 06:06

## 2017-01-01 RX ADMIN — SODIUM CHLORIDE 1000 ML: 0.9 INJECTION, SOLUTION INTRAVENOUS at 06:06

## 2017-01-01 RX ADMIN — RETINOL, ERGOCALCIFEROL, .ALPHA.-TOCOPHEROL ACETATE, DL-, PHYTONADIONE, ASCORBIC ACID, NIACINAMIDE, RIBOFLAVIN 5-PHOSPHATE SODIUM, THIAMINE HYDROCHLORIDE, PYRIDOXINE HYDROCHLORIDE, DEXPANTHENOL, BIOTIN, FOLIC ACID, AND CYANOCOBALAMIN: KIT at 11:06

## 2017-01-01 RX ADMIN — SODIUM BICARBONATE 50 MEQ: 84 INJECTION, SOLUTION INTRAVENOUS at 08:06

## 2017-01-01 RX ADMIN — IPRATROPIUM BROMIDE AND ALBUTEROL SULFATE 3 ML: .5; 3 SOLUTION RESPIRATORY (INHALATION) at 02:06

## 2017-01-01 RX ADMIN — ENOXAPARIN SODIUM 30 MG: 100 INJECTION SUBCUTANEOUS at 04:06

## 2017-01-01 RX ADMIN — RAMELTEON 8 MG: 8 TABLET, FILM COATED ORAL at 09:06

## 2017-01-01 RX ADMIN — LEVOTHYROXINE SODIUM ANHYDROUS 12.5 MCG: 100 INJECTION, POWDER, LYOPHILIZED, FOR SOLUTION INTRAVENOUS at 09:06

## 2017-01-01 RX ADMIN — SODIUM BICARBONATE: 84 INJECTION, SOLUTION INTRAVENOUS at 07:06

## 2017-01-01 RX ADMIN — ASPIRIN 81 MG 81 MG: 81 TABLET ORAL at 09:06

## 2017-01-01 RX ADMIN — METRONIDAZOLE 500 MG: 500 INJECTION, SOLUTION INTRAVENOUS at 12:06

## 2017-01-01 RX ADMIN — ASPIRIN 300 MG: 300 SUPPOSITORY RECTAL at 05:06

## 2017-01-01 RX ADMIN — DEXTROSE MONOHYDRATE 12.5 G: 25 INJECTION, SOLUTION INTRAVENOUS at 08:06

## 2017-01-01 RX ADMIN — METOPROLOL TARTRATE 5 MG: 5 INJECTION INTRAVENOUS at 01:06

## 2017-01-01 RX ADMIN — POTASSIUM CHLORIDE: 2 INJECTION, SOLUTION, CONCENTRATE INTRAVENOUS at 08:06

## 2017-01-01 RX ADMIN — Medication: at 09:06

## 2017-01-01 RX ADMIN — ENOXAPARIN SODIUM 30 MG: 100 INJECTION SUBCUTANEOUS at 06:06

## 2017-01-01 RX ADMIN — ATORVASTATIN CALCIUM 10 MG: 10 TABLET, FILM COATED ORAL at 09:06

## 2017-01-01 RX ADMIN — I.V. FAT EMULSION 250 ML: 20 EMULSION INTRAVENOUS at 01:06

## 2017-01-01 RX ADMIN — POTASSIUM CHLORIDE: 2 INJECTION, SOLUTION, CONCENTRATE INTRAVENOUS at 03:06

## 2017-01-01 RX ADMIN — METRONIDAZOLE 500 MG: 500 INJECTION, SOLUTION INTRAVENOUS at 04:06

## 2017-01-01 RX ADMIN — ACETAMINOPHEN 650 MG: 650 SUPPOSITORY RECTAL at 09:06

## 2017-01-01 RX ADMIN — ACETAMINOPHEN 650 MG: 650 SUPPOSITORY RECTAL at 08:06

## 2017-01-01 RX ADMIN — ACETAMINOPHEN 650 MG: 650 SUPPOSITORY RECTAL at 06:06

## 2017-01-01 RX ADMIN — POTASSIUM CHLORIDE 10 MEQ: 10 INJECTION, SOLUTION INTRAVENOUS at 08:06

## 2017-01-01 RX ADMIN — SODIUM CHLORIDE 999 ML/HR: 0.9 INJECTION, SOLUTION INTRAVENOUS at 08:06

## 2017-01-01 RX ADMIN — VANCOMYCIN HYDROCHLORIDE 1000 MG: 1 INJECTION, POWDER, LYOPHILIZED, FOR SOLUTION INTRAVENOUS at 09:06

## 2017-01-01 RX ADMIN — IBUPROFEN 400 MG: 100 SUSPENSION ORAL at 03:06

## 2017-01-01 RX ADMIN — LEVOTHYROXINE SODIUM 25 MCG: 25 TABLET ORAL at 08:06

## 2017-06-11 PROBLEM — D64.9 ANEMIA: Status: ACTIVE | Noted: 2017-01-01

## 2017-06-11 PROBLEM — I50.42 CHRONIC COMBINED SYSTOLIC AND DIASTOLIC HEART FAILURE: Status: ACTIVE | Noted: 2017-01-01

## 2017-06-11 PROBLEM — R00.0 TACHYCARDIA: Status: ACTIVE | Noted: 2017-01-01

## 2017-06-11 PROBLEM — R79.89 ELEVATED TROPONIN I LEVEL: Status: ACTIVE | Noted: 2017-01-01

## 2017-06-11 PROBLEM — N17.9 ACUTE RENAL FAILURE: Status: ACTIVE | Noted: 2017-01-01

## 2017-06-11 PROBLEM — E87.0 HYPERNATREMIA: Status: ACTIVE | Noted: 2017-01-01

## 2017-06-11 PROBLEM — K56.609 SMALL BOWEL OBSTRUCTION: Status: ACTIVE | Noted: 2017-01-01

## 2017-06-11 PROBLEM — R29.810 FACIAL DROOP: Status: ACTIVE | Noted: 2017-01-01

## 2017-06-11 PROBLEM — D63.8 ANEMIA, CHRONIC DISEASE: Status: ACTIVE | Noted: 2017-01-01

## 2017-06-11 NOTE — PLAN OF CARE
86 years old female with unknown past medical history,but possible COPD,CAD,CHF,has been transferred from [facility] for SBO,posible CVA duo to left facial drop,CT head did not show much acute abnormality per report,ARF.for unknown reason patient has been transferred to ICU !she is awake and alert,hemodynamicaly stable,stable on NC O 2 with no abdominal pain,no complain at all at this time,still has some left facial drop,not sure how chronic it is.surgery and neuoro on board,on IVF,will have MRI brain,carotid doppler,Echo,PT,OT,ST,keep NPO at this time.NG has been placed.  She does not met ICU criteria,will transfer to tele.

## 2017-06-11 NOTE — CONSULTS
Dictation #1  MRN:[id number]  CSN:[id number]    Consult dictated # [id number]    Continue IVF  Add Bicarb to IVF  Renal US  We'll follow  Thanks

## 2017-06-11 NOTE — H&P
"[facility]  History & Physical    Patient Name: Seema Schreiber  MRN: [id number]  Admission Date: 6/11/2017  Attending Physician: Ricco Knott MD   Primary Care Provider: Primary Doctor No         Patient information was obtained from patient and ER records.     Subjective:     Principal Problem:Small bowel obstruction    Chief Complaint:   Chief Complaint   Patient presents with    Weakness     Per EMS pt had left sided facial drooping and altered LOC, EMS also states that pt has small bowel obstruction.         HPI: Seema Schreiber is a 86 y.o. AAF with history of MI, COPD? And CHF? who presented to [facility] from [facility]. According to documentation she was transferred for evaluation of confirmed stroke and small bowel obstruction as seen on CT at [facility]. Per EMS, pt had L-sided facial drooping and altered mental status.     Daughters initially brought pt to [facility] for increased confusion (x1 day), decreased appetite (x4 days), and weakness. Pt is normally able to ambulate by herself but has been unable to these past few days. Daughters deny emesis.   Patient is awake and alert at the time of interview, denies constipation or emesis states, "I keep harking like I want to throw up". She reports last BM as yesterday, says it was formed and normal. She denies any chronic pain issues or chronic use of narcotics. Of note patient has well distributed macular papular rash on BL thighs and trunk. States her PCP told her that she had the mumps about 1 month ago, she tells me, "they said they can't do nothing about it".    Patient's labs reveal hypernatremia, dehydration with acute on chronic renal failure III. Her physical assessment is negative for acute bowel but bowel sounds completely absent. She has NG tube in place and denies NV abdominal pain or diarrhea. There is slight L facial drooping with slight blinking eyelid delay.    Past Medical " History:   Diagnosis Date    CHF (congestive heart failure)     COPD (chronic obstructive pulmonary disease)     MI, acute, non ST segment elevation        No past surgical history on file.    Review of patient's allergies indicates:   Allergen Reactions    Codeine     Penicillins        No current facility-administered medications on file prior to encounter.      No current outpatient prescriptions on file prior to encounter.     Family History     None        Social History Main Topics    Smoking status: Not on file    Smokeless tobacco: Not on file    Alcohol use Not on file    Drug use: Unknown    Sexual activity: Not on file     Review of Systems   Constitutional: Positive for appetite change. Negative for chills, diaphoresis and fever.   HENT: Negative for congestion, hearing loss, sore throat, tinnitus and trouble swallowing.    Eyes: Negative for photophobia, discharge, itching and visual disturbance.        L eyelid blinking delay   Respiratory: Negative for apnea, cough, wheezing and stridor.    Cardiovascular: Negative for chest pain, palpitations and leg swelling.   Gastrointestinal: Positive for nausea. Negative for abdominal distention, abdominal pain, blood in stool, constipation and diarrhea.   Endocrine: Negative for polydipsia, polyphagia and polyuria.   Genitourinary: Negative for difficulty urinating, dysuria, flank pain and frequency.   Musculoskeletal: Negative for arthralgias, joint swelling and neck stiffness.        Uses rolling walker at home to ambulate intermittently   Skin: Positive for rash. Negative for color change and wound.   Neurological: Positive for facial asymmetry and weakness. Negative for dizziness, tremors, seizures, light-headedness, numbness and headaches.   Hematological: Negative for adenopathy.   Psychiatric/Behavioral: Negative for hallucinations and self-injury.        Alert to self, time and place     Objective:     Vital Signs (Most Recent):  Temp: 98.4 °F  (36.9 °C) (06/11/17 0716)  Pulse: (!) 118 (06/11/17 0732)  Resp: 20 (06/11/17 0716)  BP: 137/72 (06/11/17 0732)  SpO2: 97 % (06/11/17 0732) Vital Signs (24h Range):  Temp:  [98.4 °F (36.9 °C)-98.5 °F (36.9 °C)] 98.4 °F (36.9 °C)  Pulse:  [112-124] 118  Resp:  [16-20] 20  SpO2:  [95 %-98 %] 97 %  BP: (110-151)/(55-72) 137/72     Weight: 72 kg (158 lb 11.7 oz)  Body mass index is 27.25 kg/m².    Physical Exam   Constitutional: She appears well-developed and well-nourished. She is cooperative.   HENT:   Head: Normocephalic and atraumatic.   NG tube clamped   Eyes: Conjunctivae and lids are normal.   Neck: Normal range of motion and full passive range of motion without pain. Neck supple. No JVD present. No edema present. No thyroid mass present.   Cardiovascular: S1 normal, S2 normal and intact distal pulses.    No murmur heard.  Abdominal: Soft. She exhibits no distension and no abdominal bruit. There is no splenomegaly or hepatomegaly. There is no tenderness. There is no CVA tenderness.   Negative bowel sounds   Genitourinary:   Genitourinary Comments: Cheung catheter draining to gravity   Musculoskeletal: She exhibits no edema.   Generalized weakness   Lymphadenopathy:     She has no cervical adenopathy.     She has no axillary adenopathy.   Neurological: She is alert. She has normal reflexes. She displays no tremor. She displays no seizure activity.   Skin: Skin is warm, dry and intact. Rash noted.   Psychiatric: She has a normal mood and affect. Her speech is normal. Thought content normal. Cognition and memory are normal.        Significant Labs:   BMP:   Recent Labs  Lab 06/11/17 0513   GLU 97   *   K 4.2   *   CO2 13*   BUN 48*   CREATININE 4.0*   CALCIUM 9.6   MG 1.6     CBC:   Recent Labs  Lab 06/11/17 0513   WBC 9.02   HGB 10.9*   HCT 34.5*        Cardiac Markers: No results for input(s): CKMB, MYOGLOBIN, BNP, TROPISTAT in the last 48 hours.  Lactic Acid:   Recent Labs  Lab 06/11/17  0570    LACTATE 1.7     Lipid Panel: No results for input(s): CHOL, HDL, LDLCALC, TRIG, CHOLHDL in the last 48 hours.  Troponin:   Recent Labs  Lab 06/11/17  0513   TROPONINI 0.047*     TSH: No results for input(s): TSH in the last 4320 hours.  Urine Culture: No results for input(s): LABURIN in the last 48 hours.    Significant Imaging:   Imaging Results          X-Ray Abdomen Portable_NG Tube Placement (Final result)  Result time 06/11/17 10:12:23    Final result by Monalisa Spain MD (06/11/17 10:12:23)                 Impression:        Interval placement of NG tube, the tip of which appears in good position projected over the left upper quadrant of the abdomen.    Dilated loops of small bowel in the mid to lower abdomen. No obvious free air.    Right femoral line in place. Surgical clips in the right upper quadrant.      Electronically signed by: MONALISA SPAIN MD  Date:     06/11/17  Time:    10:12              Narrative:    06/11/17 09:31:02    Accession# [id number]    CLINICAL INDICATION: 86 year old F with ngt placement     TECHNIQUE: Frontal radiograph of the abdomen.    COMPARISON:  06/11/2017 and 06:03    FINDINGS                             CT Previous (Final result)  Result time 06/11/17 06:44:12               X-Ray Abdomen Flat And Erect (Final result)  Result time 06/11/17 06:30:18    Final result by Cait Weems MD (06/11/17 06:30:18)                 Impression:        Radiographic findings suggestive of small bowel obstruction.      Electronically signed by: CAIT WEEMS MD  Date:     06/11/17  Time:    06:30              Narrative:    Technique: Supine and erect abdominal/pelvic radiographs.    Comparison: None.    Findings:     There is scattered gas within dilated loops of small bowel throughout the mid and lower abdomen.  No definite intra-abdominal free air.  Right-sided central venous catheter projects to the right of midline.  No radiographic findings to suggest organomegaly or mass effect.   Cholecystectomy clips are identified.  Degenerative changes of the lumbar spine and SI joints noted.                             X-Ray Chest 1 View (Final result)  Result time 06/11/17 06:02:04    Final result by Cait Weems MD (06/11/17 06:02:04)                 Impression:        No acute radiographic findings in the chest.      Electronically signed by: CAIT WEEMS MD  Date:     06/11/17  Time:    06:02              Narrative:    Technique: AP chest radiograph.    Comparison: None.    Findings:    Cardiac monitoring leads project over the bilateral hemithoraces.  Mediastinal structures are midline.  There is calcification of the aortic arch.  There is an elevated left hemidiaphragm.  No focal consolidation.  No pneumothorax or pleural effusions.  Chronic fibrotic changes noted about the left costophrenic angle.  Degenerative changes of the glenohumeral joints and thoracic spine noted.  No acute osseous abnormalities.                                Assessment/Plan:     * Small bowel obstruction    Acute nausea without vomiting at this time - NG tube in place with metabolic alkalosis; lactic acid normal  -Awaiting surgery recommendation  -Continue NPO status  -IV fluids D50.45/NS cautiously (2/2 heart failure/obtain 2D echo)  -strict I&O's            Facial droop    Acute according to family per note; rash on trunk and thighs  Neurology following  Awaiting MRI  Alert and oriented  RPR, sed rate, crp        Acute renal failure    Unclear etiology; creatine = 4.0/gfr=10 - NV with Bowel obstruction; albumin 2.3 likely nutrition component with bowel obstruction  IV fluids  Strict I&O's  Monitor chemistry  Consult to Nephrology  CT renal stone pending  Urinalysis        Chronic combined systolic and diastolic heart failure    Patient reports history, no CP or FVO appreciated on exam  -2D echo  -Strict I&O's          Hypernatremia    Patient is at baseline mentation  D5/0.45NS at 75 cc  CKD correction per  nephrology          Anemia, chronic disease    No overt signs of bleeding / Anemia studies liekly 2/2 renal disease  Anemia studies  Nephrology consult as above        Elevated troponin I level    Likely 2/2 renal - denies chest pain          Tachycardia    2/2 dehydration - HR stable, SR on tele -Resolved - stable vitals  -telemetry monitoring            VTE Risk Mitigation         Ordered     enoxaparin injection 30 mg  Daily     Route:  Subcutaneous        06/11/17 1055     Medium Risk of VTE  Once      06/11/17 0856     Place sequential compression device  Until discontinued      06/11/17 0856     Place ISHMAEL hose  Until discontinued      06/11/17 0856          ASTRID Aldrich, FNP-C  PA# [id number]  NPI# [id number]  Hospitalist - Department of Hospital Medicine  [address] [place] [address]  Office [phone]; Pager [phone]

## 2017-06-11 NOTE — ASSESSMENT & PLAN NOTE
No overt signs of bleeding / Anemia studies yohan 2/2 renal disease  Anemia studies  Nephrology consult as above

## 2017-06-11 NOTE — ASSESSMENT & PLAN NOTE
Acute according to family per note; rash on trunk and thighs  Neurology following  Awaiting MRI  Alert and oriented  RPR, sed rate, crp

## 2017-06-11 NOTE — CONSULTS
Consult is from Dr. Knott.  Consult directed to Dr. Torres regarding   small bowel obstruction.    HISTORY OF PRESENT ILLNESS:  This is an 86-year-old female transferred from [facility].  She is currently unable to give any history and it is   performed by her daughter, who is at bedside.  About 5 days ago, she began to   develop gagging, which during the course of the week progressed to vomiting of   undigested food with no significant oral intake and some mild abdominal   distention.  She has continued to have bowel movements and her last one was   yesterday and was normal/brown.  Normally, she has good oral intake and regular   bowel movements.  She lives at home with her daughter usually and is able to   ambulate and converse, but is unable to do so currently.  She was imaged and   found to have what appeared to be a small-bowel obstruction by CAT scan with   some dilated proximal small bowel and a transition zone - this is by verbal   report with me discussing the case with the physician in [place] - I   reviewed the films, but I have not reviewed the report.  She has also apparently   currently suffering from a cerebrovascular accident diagnosed radiographically   and with some facial droop and arm and leg weakness and the inability to speak.    Also, her troponins are elevated and she may be currently having a myocardial   infarction.  She is currently resting in bed, in no acute distress with a   nasogastric tube in place, not attached to suction.    PAST MEDICAL HISTORY:  COPD, asthma, myocardial infarction.    PAST SURGICAL HISTORY:  Hysterectomy, colon resection, cholecystectomy.    ALLERGIES:  CODEINE AND PENICILLIN.    SOCIAL HISTORY:  She does not smoke or consume alcoholic beverages.    HOME MEDICINES:  See list - it includes prednisone 1 mg a day.    PHYSICAL EXAMINATION:  VITAL SIGNS:  Blood pressure 137/72, pulse 118, temperature 98.4.  GENERAL:  Lying in bed, in no acute  distress.  HEENT:  No cervical or supraclavicular masses.  LUNGS:  Equal breath sounds bilaterally.  CARDIOVASCULAR:  Regular rate and rhythm.  AXILLA:  No masses bilaterally.  EXTREMITIES:  Palpable radial pulse bilaterally.  GENITOURINARY:  No inguinal hernia bilaterally.  ABDOMEN:  Soft, positive bowel sounds, slightly distended, nontender, no masses.    No hernia.    LABORATORY DATA:  White blood cell count 9, hemoglobin 10, hematocrit 34 and   platelet count 323, her BUN is 48, creatinine is 4.0.  Her troponin is 0.047.    ASSESSMENT AND PLAN:  Small bowel obstruction - aggressively attempt   nonoperative management in this elderly patient with comorbidities and currently   potentially suffering from a cerebrovascular accident, possibly myocardial   infarction and on steroids.  We will check abdominal films for nasogastric tube   placement and place nasogastric tube to suction, if would fail to resolve, then   operative intervention would be required.  She would be at an elevated risk of   perioperative complications and death, situation discussed with the patient and   family members at length.    Thank you for the consult, we will follow.      ROSSANA  dd: 06/11/2017 09:06:06 (CDT)  td: 06/11/2017 12:09:44 (CDT)  Doc ID   #[id number]  Job ID #[id number]    CC: Ricco Knott M.D.

## 2017-06-11 NOTE — ASSESSMENT & PLAN NOTE
Unclear etiology; creatine = 4.0/gfr=10 - NV with Bowel obstruction; albumin 2.3 likely nutrition component with bowel obstruction  IV fluids  Strict I&O's  Monitor chemistry  Consult to Nephrology  CT renal stone pending  Urinalysis

## 2017-06-11 NOTE — ED PROVIDER NOTES
Encounter Date: 6/11/2017    SCRIBE #1 NOTE: I, Lindy Muniz, am scribing for, and in the presence of,  Indira Benedict MD. I have scribed the following portions of the note - Other sections scribed: HPI/ROS/PE.       History     Chief Complaint   Patient presents with    Weakness     Per EMS pt had left sided facial drooping and altered LOC, EMS also states that pt has small bowel obstruction.      Review of patient's allergies indicates:   Allergen Reactions    Codeine     Penicillins      CC: Weakness    HPI: 86 y.o. F with a PMHx of acute MI, COPD, and CHF presents to the ED via EMS for a confirmed stroke and small bowel obstruction as seen on CT at [facility]. Per EMS, pt had L-sided facial drooping and altered mental status. Daughters initially brought pt to [facility] for increased confusion (x1 day), decreased appetite (x4 days), and weakness. Pt is normally able to ambulate by herself but has been unable to these past few days. Daughters deny emesis.        The history is provided by a relative and the EMS personnel. History limited by: change in mental status.     Past Medical History:   Diagnosis Date    CHF (congestive heart failure)     COPD (chronic obstructive pulmonary disease)     MI, acute, non ST segment elevation      No past surgical history on file.  No family history on file.  Social History   Substance Use Topics    Smoking status: Not on file    Smokeless tobacco: Not on file    Alcohol use Not on file     Review of Systems   Unable to perform ROS: Mental status change   Constitutional: Positive for appetite change (decreased). Negative for chills and fever.   HENT: Negative for nosebleeds.    Eyes: Negative for discharge.   Respiratory: Negative for shortness of breath.    Skin: Negative for rash.   Neurological: Positive for facial asymmetry (L-sided) and weakness.   Psychiatric/Behavioral: Positive for confusion.       Physical Exam     Initial Vitals [06/11/17 0444]    BP Pulse Resp Temp SpO2   (!) 151/69 (!) 122 16 98.5 °F (36.9 °C) 96 %     Physical Exam    Nursing note and vitals reviewed.  Constitutional: She appears well-developed.   HENT:   Head: Normocephalic and atraumatic.   Mouth/Throat: No oropharyngeal exudate.   L facial droop   Eyes: EOM are normal. Pupils are equal, round, and reactive to light.   Neck: Normal range of motion. Neck supple.   Cardiovascular: Normal heart sounds. Tachycardia present.    tachycardic   Pulmonary/Chest: Effort normal and breath sounds normal. No respiratory distress.   Abdominal: Soft. She exhibits distension.   Grimaces on exam.    Neurological: She is alert. She displays atrophy. A cranial nerve deficit is present. She exhibits abnormal muscle tone. GCS eye subscore is 4. GCS verbal subscore is 4. GCS motor subscore is 6.   L sided facial drooping. Slurred speech. + Inattension, able to follow simple commands   Skin: Skin is warm and dry. No rash noted.   Psychiatric: She has a normal mood and affect.         ED Course   Procedures  Labs Reviewed   CBC W/ AUTO DIFFERENTIAL - Abnormal; Notable for the following:        Result Value    Hemoglobin 10.9 (*)     Hematocrit 34.5 (*)     MCV 73 (*)     MCH 22.9 (*)     MCHC 31.6 (*)     RDW 17.1 (*)     Mono # 1.1 (*)     Eos # 0.9 (*)     Eosinophil% 10.1 (*)     All other components within normal limits   COMPREHENSIVE METABOLIC PANEL - Abnormal; Notable for the following:     Sodium 154 (*)     Chloride 123 (*)     CO2 13 (*)     BUN, Bld 48 (*)     Creatinine 4.0 (*)     Albumin 2.3 (*)     AST 50 (*)     Anion Gap 18 (*)     eGFR if  11 (*)     eGFR if non  10 (*)     All other components within normal limits   TROPONIN I - Abnormal; Notable for the following:     Troponin I 0.047 (*)     All other components within normal limits   LACTIC ACID, PLASMA   MAGNESIUM     EKG Readings: (Independently Interpreted)   Sinus tachycardia, heart rate 118, normal  axis, normal intervals, T-wave inversion V5, V6, no priors       X-Rays:   Independently Interpreted Readings:   Chest X-Ray: Normal heart size.  No infiltrates.  No acute abnormalities. No free air under diaphragm     Medical Decision Making:   Initial Assessment:   Urgent evaluation of 86-year-old female sent does transfer for admission given new diagnosis of acute SBO, ischemic CVA, renal failure, and STEMI. Unclear course of care prior to arrival here apart from asa, IVF, abx.   On arrival here family reports acute mental status changes yesterday w facial droop in setting of recent abdominal pain. Repeat imaging at [facility] reveal acute SBO w transition pt and concern for ischemic cva. On arrival in ED pt tachycardic w mild tachynpea requiring NC. Exam consistent w L facial droop, slurred speech and tender abdomen. CXR without free air under diaphragm. [facility] [place] and [place] from Surgery reportedly made aware by transfer center for need of admission, but unclear disposition decision.   Radiology here was unable to upload outside imaging. Plan to admit given frequent neuro checks for presumed ischemic cva, management of SAVITA, impending deterioration given comorbities and acute high grade SBO.     Discussed w La, already aware and planning to see pt.   Clinical Tests:   Lab Tests: Ordered  Radiological Study: Ordered  Medical Tests: Ordered  ED Management:  Multiple calls received regarding disposition of patient. It is obvious pt needs higher level of care and reportedly already accepted w abi and surgery aware (per transfer center). Given unclarity admission orders entered for ICU.             Scribe Attestation:   Scribe #1: I performed the above scribed service and the documentation accurately describes the services I performed. I attest to the accuracy of the note.    Attending Attestation:           Physician Attestation for Scribe:  Physician Attestation Statement for Scribe #1:  I, Indira Benedict MD, reviewed documentation, as scribed by Lindy Muniz in my presence, and it is both accurate and complete.                 ED Course     Clinical Impression:   The primary encounter diagnosis was Tachycardia. Diagnoses of Respiratory distress, Acute renal failure, unspecified acute renal failure type, Pain, acute, CVA (cerebral vascular accident), and Small bowel obstruction were also pertinent to this visit.    Disposition:   Disposition: Admitted  Condition: Serious       Indira Benedict MD  06/13/17 1842

## 2017-06-11 NOTE — ED TRIAGE NOTES
Per family, pt hasn't been acting right or eating normally, so they took her to [place]. Pt has been complaining of weakness. Per EMS, pt is here to r/o stroke and GI obstruction. Pt given levaquin at [facility]. Pt is confused.

## 2017-06-11 NOTE — PROGRESS NOTES
gen surg  Consult dictated [id number]  sbo-prob from adhesions-aggressivley attempt nonop managemnent due to comorbidities; thank you for consult, will check abd film for placement of ng tube and attach to suction

## 2017-06-11 NOTE — SUBJECTIVE & OBJECTIVE
Past Medical History:   Diagnosis Date    CHF (congestive heart failure)     COPD (chronic obstructive pulmonary disease)     MI, acute, non ST segment elevation        No past surgical history on file.    Review of patient's allergies indicates:   Allergen Reactions    Codeine     Penicillins        No current facility-administered medications on file prior to encounter.      No current outpatient prescriptions on file prior to encounter.     Family History     None        Social History Main Topics    Smoking status: Not on file    Smokeless tobacco: Not on file    Alcohol use Not on file    Drug use: Unknown    Sexual activity: Not on file     Review of Systems   Constitutional: Positive for appetite change. Negative for chills, diaphoresis and fever.   HENT: Negative for congestion, hearing loss, sore throat, tinnitus and trouble swallowing.    Eyes: Negative for photophobia, discharge, itching and visual disturbance.        L eyelid blinking delay   Respiratory: Negative for apnea, cough, wheezing and stridor.    Cardiovascular: Negative for chest pain, palpitations and leg swelling.   Gastrointestinal: Positive for nausea. Negative for abdominal distention, abdominal pain, blood in stool, constipation and diarrhea.   Endocrine: Negative for polydipsia, polyphagia and polyuria.   Genitourinary: Negative for difficulty urinating, dysuria, flank pain and frequency.   Musculoskeletal: Negative for arthralgias, joint swelling and neck stiffness.        Uses rolling walker at home to ambulate intermittently   Skin: Positive for rash. Negative for color change and wound.   Neurological: Positive for facial asymmetry and weakness. Negative for dizziness, tremors, seizures, light-headedness, numbness and headaches.   Hematological: Negative for adenopathy.   Psychiatric/Behavioral: Negative for hallucinations and self-injury.        Alert to self, time and place     Objective:     Vital Signs (Most  Recent):  Temp: 98.4 °F (36.9 °C) (06/11/17 0716)  Pulse: (!) 118 (06/11/17 0732)  Resp: 20 (06/11/17 0716)  BP: 137/72 (06/11/17 0732)  SpO2: 97 % (06/11/17 0732) Vital Signs (24h Range):  Temp:  [98.4 °F (36.9 °C)-98.5 °F (36.9 °C)] 98.4 °F (36.9 °C)  Pulse:  [112-124] 118  Resp:  [16-20] 20  SpO2:  [95 %-98 %] 97 %  BP: (110-151)/(55-72) 137/72     Weight: 72 kg (158 lb 11.7 oz)  Body mass index is 27.25 kg/m².    Physical Exam   Constitutional: She appears well-developed and well-nourished. She is cooperative.   HENT:   Head: Normocephalic and atraumatic.   NG tube clamped   Eyes: Conjunctivae and lids are normal.   Neck: Normal range of motion and full passive range of motion without pain. Neck supple. No JVD present. No edema present. No thyroid mass present.   Cardiovascular: S1 normal, S2 normal and intact distal pulses.    No murmur heard.  Abdominal: Soft. She exhibits no distension and no abdominal bruit. There is no splenomegaly or hepatomegaly. There is no tenderness. There is no CVA tenderness.   Negative bowel sounds   Genitourinary:   Genitourinary Comments: Cheung catheter draining to gravity   Musculoskeletal: She exhibits no edema.   Generalized weakness   Lymphadenopathy:     She has no cervical adenopathy.     She has no axillary adenopathy.   Neurological: She is alert. She has normal reflexes. She displays no tremor. She displays no seizure activity.   Skin: Skin is warm, dry and intact. Rash noted.   Psychiatric: She has a normal mood and affect. Her speech is normal. Thought content normal. Cognition and memory are normal.        Significant Labs:   BMP:   Recent Labs  Lab 06/11/17 0513   GLU 97   *   K 4.2   *   CO2 13*   BUN 48*   CREATININE 4.0*   CALCIUM 9.6   MG 1.6     CBC:   Recent Labs  Lab 06/11/17 0513   WBC 9.02   HGB 10.9*   HCT 34.5*        Cardiac Markers: No results for input(s): CKMB, MYOGLOBIN, BNP, TROPISTAT in the last 48 hours.  Lactic Acid:   Recent  Labs  Lab 06/11/17  0513   LACTATE 1.7     Lipid Panel: No results for input(s): CHOL, HDL, LDLCALC, TRIG, CHOLHDL in the last 48 hours.  Troponin:   Recent Labs  Lab 06/11/17  0513   TROPONINI 0.047*     TSH: No results for input(s): TSH in the last 4320 hours.  Urine Culture: No results for input(s): LABURIN in the last 48 hours.    Significant Imaging:   Imaging Results          X-Ray Abdomen Portable_NG Tube Placement (Final result)  Result time 06/11/17 10:12:23    Final result by Monalisa Spain MD (06/11/17 10:12:23)                 Impression:        Interval placement of NG tube, the tip of which appears in good position projected over the left upper quadrant of the abdomen.    Dilated loops of small bowel in the mid to lower abdomen. No obvious free air.    Right femoral line in place. Surgical clips in the right upper quadrant.      Electronically signed by: MONALISA SPAIN MD  Date:     06/11/17  Time:    10:12              Narrative:    06/11/17 09:31:02    Accession# [id number]    CLINICAL INDICATION: 86 year old F with ngt placement     TECHNIQUE: Frontal radiograph of the abdomen.    COMPARISON:  06/11/2017 and 06:03    FINDINGS                             CT Previous (Final result)  Result time 06/11/17 06:44:12               X-Ray Abdomen Flat And Erect (Final result)  Result time 06/11/17 06:30:18    Final result by Cait Weems MD (06/11/17 06:30:18)                 Impression:        Radiographic findings suggestive of small bowel obstruction.      Electronically signed by: CAIT WEEMS MD  Date:     06/11/17  Time:    06:30              Narrative:    Technique: Supine and erect abdominal/pelvic radiographs.    Comparison: None.    Findings:     There is scattered gas within dilated loops of small bowel throughout the mid and lower abdomen.  No definite intra-abdominal free air.  Right-sided central venous catheter projects to the right of midline.  No radiographic findings to suggest  organomegaly or mass effect.  Cholecystectomy clips are identified.  Degenerative changes of the lumbar spine and SI joints noted.                             X-Ray Chest 1 View (Final result)  Result time 06/11/17 06:02:04    Final result by Cait Weems MD (06/11/17 06:02:04)                 Impression:        No acute radiographic findings in the chest.      Electronically signed by: CAIT WEEMS MD  Date:     06/11/17  Time:    06:02              Narrative:    Technique: AP chest radiograph.    Comparison: None.    Findings:    Cardiac monitoring leads project over the bilateral hemithoraces.  Mediastinal structures are midline.  There is calcification of the aortic arch.  There is an elevated left hemidiaphragm.  No focal consolidation.  No pneumothorax or pleural effusions.  Chronic fibrotic changes noted about the left costophrenic angle.  Degenerative changes of the glenohumeral joints and thoracic spine noted.  No acute osseous abnormalities.

## 2017-06-11 NOTE — CONSULTS
[facility]  Neurology  Consult Note    Patient Name: Seema Schreiber  MRN: [id number]  Admission Date: 6/11/2017  Hospital Length of Stay: 0 days  Code Status: Full Code   Attending Provider: Ricco Knott MD   Consulting Provider: James Alfred MD  Primary Care Physician: Primary Doctor No  Principal Problem:Hypernatremia    Consults  Subjective:     Chief Complaint/Reason for consult: Facial droop    HPI: 87 y/o female with medical Hx as listed below brought to ED for increased confusion and facial droop. Symptoms had been present sonce the day prior to admission. Pt was seen at an outside facility where she was found to be hypernatremic, with a small bowel obstruction, and a reported stroke. No reports of lateralized weakness except for facial droop. Pt is alert and able to follow most commands at moment of evaluation.    Past Medical History:   Diagnosis Date    CHF (congestive heart failure)     COPD (chronic obstructive pulmonary disease)     MI, acute, non ST segment elevation        No past surgical history on file.    Review of patient's allergies indicates:   Allergen Reactions    Codeine     Penicillins        Current Neurological Medications:     No current facility-administered medications on file prior to encounter.      No current outpatient prescriptions on file prior to encounter.      Family History     None        Social History Main Topics    Smoking status: Not on file    Smokeless tobacco: Not on file    Alcohol use Not on file    Drug use: Unknown    Sexual activity: Not on file     Review of Systems       Objective:     Vital Signs (Most Recent):  Temp: 98.4 °F (36.9 °C) (06/11/17 0716)  Pulse: (!) 118 (06/11/17 0732)  Resp: 20 (06/11/17 0716)  BP: 137/72 (06/11/17 0732)  SpO2: 97 % (06/11/17 0732) Vital Signs (24h Range):  Temp:  [98.4 °F (36.9 °C)-98.5 °F (36.9 °C)] 98.4 °F (36.9 °C)  Pulse:  [112-124] 118  Resp:  [16-20] 20  SpO2:  [95 %-98 %] 97 %  BP:  (110-151)/(55-72) 137/72     Weight: 72 kg (158 lb 11.7 oz)  Body mass index is 27.25 kg/m².    Physical Exam  Constitutional: well-developed  Head: normocephalic  Eyes: conjunctivae/corneas clear.  Nose: no discharge, no epistaxis  Heart: regular rate and rhythm.  Abdomen: pain to palaption  Extremities: no edema  Neurologic: Mental status: alert; oriented to self, place, time; follows commands   Cranial nerves: pupils are 3 mm and reactive; EOMI; no nystagmus; right upper and lower facial weakness; tongue protrudes midline; palate elevates symmetrically  Strength: able to elevate UE's and LE's against gravity; no drift         Significant Labs:   CBC:   Recent Labs  Lab 06/11/17 0513   WBC 9.02   HGB 10.9*   HCT 34.5*        CMP:   Recent Labs  Lab 06/11/17 0513   GLU 97   *   K 4.2   *   CO2 13*   BUN 48*   CREATININE 4.0*   CALCIUM 9.6   MG 1.6   PROT 6.3   ALBUMIN 2.3*   BILITOT 0.5   ALKPHOS 83   AST 50*   ALT 13   ANIONGAP 18*   EGFRNONAA 10*       Assessment and Plan:     87 y/o female consulted for possible stroke    1. 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 No definite weakness or neglect on exam. She still may have stroke but exam is not sugestive of one.   -Correct hypernatremia.   -Will monitor.    Active Diagnoses:    Diagnosis Date Noted POA    PRINCIPAL PROBLEM:  Hypernatremia [E87.0] 06/11/2017 Yes    Small bowel obstruction [K56.69] 06/11/2017 Yes    Acute renal failure [N17.9] 06/11/2017 Unknown    Chronic combined systolic and diastolic heart failure [I50.42] 06/11/2017 Yes    Elevated troponin I level [R74.8] 06/11/2017 Yes    Anemia [D64.9] 06/11/2017 Yes      Problems Resolved During this Admission:    Diagnosis Date Noted Date Resolved POA       VTE Risk Mitigation         Ordered     enoxaparin injection 30 mg  Daily     Route:  Subcutaneous         06/11/17 1055     Medium Risk of VTE  Once      06/11/17 0856     Place sequential compression device  Until discontinued      06/11/17 0856     Place ISHMAEL hose  Until discontinued      06/11/17 0856          Thank you for your consult. I will follow-up with patient. Please contact us if you have any additional questions.    James Alfred MD  Neurology  [facility]

## 2017-06-11 NOTE — HPI
"Seema Schreiber is a 86 y.o. AAF with history of MI, COPD? And CHF? who presented to [facility] from [facility]. According to documentation she was transferred for evaluation of confirmed stroke and small bowel obstruction as seen on CT at [facility]. Per EMS, pt had L-sided facial drooping and altered mental status.     Daughters initially brought pt to [place] for increased confusion (x1 day), decreased appetite (x4 days), and weakness. Pt is normally able to ambulate by herself but has been unable to these past few days. Daughters deny emesis.  Patient is awake and alert at the time of interview, denies constipation or emesis states, "I keep harking like I want to throw up". She reports last BM as yesterday, says it was formed and normal. She denies any chronic pain issues or chronic use of narcotics. Of note patient has well distributed macular papular rash on BL thighs and trunk. States her PCP told her that she had the mumps about 1 month ago, she tells Ms. Reveles, "they said they can't do nothing about it".    Patient's labs reveal hypernatremia, dehydration with acute on chronic renal failure III. Her physical assessment is negative for acute bowel but bowel sounds completely absent. She has NG tube in place and denies NV abdominal pain or diarrhea. There is slight L facial drooping with slight blinking eyelid delay.  "

## 2017-06-11 NOTE — ASSESSMENT & PLAN NOTE
Acute nausea without vomiting at this time - NG tube in place with metabolic alkalosis; lactic acid normal  -Awaiting surgery recommendation  -Continue NPO status  -IV fluids D50.45/NS cautiously (2/2 heart failure/obtain 2D echo)  -strict I&O's

## 2017-06-12 PROBLEM — R53.1 WEAKNESS: Status: ACTIVE | Noted: 2017-01-01

## 2017-06-12 PROBLEM — E43 SEVERE MALNUTRITION: Status: ACTIVE | Noted: 2017-01-01

## 2017-06-12 NOTE — PT/OT/SLP EVAL
Occupational Therapy  Evaluation    Seema Schreiber   MRN: [id number]   Admitting Diagnosis: Small bowel obstruction    OT Date of Treatment: 06/12/17   OT Start Time: 1059  OT Stop Time: 1116  OT Total Time (min): 17 min    Billable Minutes:  Evaluation 17 (co-eval with PT)    Diagnosis: Small bowel obstruction       Past Medical History:   Diagnosis Date    CHF (congestive heart failure)     COPD (chronic obstructive pulmonary disease)     MI, acute, non ST segment elevation       No past surgical history on file.    Referring physician: Hedy  Date referred to OT: 6/11/17    General Precautions: Standard, fall, NPO  Orthopedic Precautions: N/A  Braces:   none    Do you have any cultural, spiritual, Anglican conflicts, given your current situation?: no     Patient History:  Living Environment  Lives With: child(magnolia), adult (daughter)  Living Arrangements: house  Equipment Currently Used at Home: walker, rolling    Prior level of function:   Bed Mobility/Transfers: needs device  Grooming: independent  Bathing: independent  Upper Body Dressing: independent  Lower Body Dressing: independent  Toileting: independent  IADL Comments: Patient states she lives alone and was able to care for herself. No family was available to give a functional history. Per chart, she lives with her dtr and amb using a RW.     Dominant hand: right    Subjective:  Communicated with nurseYari prior to session.    Chief Complaint: mouth feels dry  Patient/Family stated goals: none stated    Pain/Comfort  Pain Rating 1: 0/10    Objective:  Patient found with: SCD, telemetry, almendarez catheter, PICC line, NG tube (right groin PICC)    Cognitive Exam:  Oriented to: Person  Follows Commands/attention: Follows one-step commands and slow to respond and inconsitant  Communication: clear/fluent and minimal response  Memory:  Impaired STM and unable to give a PLOF  Safety awareness/insight to disability: impaired  Coping skills/emotional control:  Appropriate to situation    Visual/perceptual:  To be assessed    Physical Exam:  Postural examination/scapula alignment: Rounded shoulder and Head forward  Skin integrity: Visible skin intact  Edema: None noted     Sensation:   Intact    Upper Extremity Range of Motion:  Right Upper Extremity: WFL except shldr with limited flexion  Left Upper Extremity: WFL except shldr limited flexion    Upper Extremity Strength:  Right Upper Extremity: -3/5  Left Upper Extremity: -3/5   Strength: poor    Fine motor coordination:   Impaired 2* weakness and arthritic hands      Functional Mobility:  Bed Mobility:  Rolling/Turning to Left: Maximum assistance  Rolling/Turning Right: Maximum assistance  Scooting/Bridging: Maximum Assistance  Supine to Sit: Maximum Assistance  Sit to Supine: Dependent    Transfers:  Sit <> Stand Assistance:  (did not stand 2* weakness)    Functional Ambulation: N/T    Activities of Daily Living:  Feeding Level of Assistance: Activity did not occur (NPO)  NGT to wall suction  UE Dressing Level of Assistance: Maximum assistance  LE Dressing Level of Assistance: Total assistance  Grooming Position: EOB  Grooming Level of Assistance: Contact guard assistance (to swab her mouth x2)    Balance:   Static Sit: FAIR: Maintains without assist, but unable to take any challenges   Dynamic Sit: FAIR: Cannot move trunk without losing balance  Static Stand: N/T    Therapeutic Activities and Exercises:  The patient tolerated sitting EOB ~10 min with SBA.    AM-PAC 6 CLICK ADL  How much help from another person does this patient currently need?  1 = Unable, Total/Dependent Assistance  2 = A lot, Maximum/Moderate Assistance  3 = A little, Minimum/Contact Guard/Supervision  4 = None, Modified [place]/Independent    Putting on and taking off regular lower body clothing? : 1  Bathing (including washing, rinsing, drying)?: 2  Toileting, which includes using toilet, bedpan, or urinal? : 2  Putting on and taking off  "regular upper body clothing?: 2  Taking care of personal grooming such as brushing teeth?: 3  Eating meals?: 1  Total Score: 11    AM-PAC Raw Score CMS "G-Code Modifier Level of Impairment Assistance   6 % Total / Unable   7 - 9 CM 80 - 100% Maximal Assist   10-14 CL 60 - 80% Moderate Assist   15 - 19 CK 40 - 60% Moderate Assist   20 - 22 CJ 20 - 40% Minimal Assist   23 CI 1-20% SBA / CGA   24 CH 0% Independent/ Mod I       Patient left right sidelying with all lines intact, call button in reach, bed alarm on and nurseYari notified    Assessment:  Seema Schreiber is a 86 y.o. female with a medical diagnosis of Small bowel obstruction. Patient will benefit from OT to address functional deficits.    Rehab identified problem list/impairments: Rehab identified problem list/impairments: weakness, impaired endurance, impaired self care skills, impaired functional mobilty, gait instability, impaired balance, impaired cognition, decreased lower extremity function, decreased upper extremity function, decreased coordination     Rehab potential is fair.    Activity tolerance: Fair    Discharge recommendations: Discharge Facility/Level Of Care Needs:  (TBD )     Barriers to discharge: Barriers to Discharge: Other (Comment) (Patient currently requires assist for self care and mobility .)    Equipment recommendations:  (TBD .)     GOALS:    Occupational Therapy Goals        Problem: Occupational Therapy Goal    Goal Priority Disciplines Outcome Interventions   Occupational Therapy Goal     OT, PT/OT Ongoing (interventions implemented as appropriate)    Description:  Goals to be met by: 6/26/17    Patient will increase functional independence with ADLs by performing:    Feeding with to be assessed as able.  UE Dressing with Minimal Assistance.  LE Dressing with Moderate Assistance.  Grooming while seated with Set-up Assistance and Stand-by Assistance.  Toileting from bedside commode with Stand-by Assistance for hygiene " and clothing management.   Supine to sit with Minimal Assistance.  Stand pivot transfers with Minimal Assistance.  Toilet transfer to bedside commode with Minimal Assistance.  Upper extremity exercise program x10 reps per handout, with assistance as needed.                      PLAN:  Patient to be seen 5 x/week to address the above listed problems via therapeutic activities, therapeutic exercises, self-care/home management  Plan of Care expires: 06/26/17  Plan of Care reviewed with: patient     OT G-codes  Functional Assessment Tool Used: AM PAC  Score: 11  Functional Limitation: Self care  Self Care Current Status (): CL  Self Care Goal Status (): TATA Patterson OT  06/12/2017

## 2017-06-12 NOTE — PROGRESS NOTES
Progress Note    Admit Date: 6/11/2017   LOS: 1 day     SUBJECTIVE:       Denies abd pain.  Nods yes when asked if feeling better.  +BM    OBJECTIVE:       Vital Signs Range (Last 24H):  Temp:  [97.4 °F (36.3 °C)-99.3 °F (37.4 °C)]   Pulse:  [106-127]   Resp:  [19-51]   BP: ()/(53-87)   SpO2:  [93 %-100 %]     I & O (Last 24H):  Intake/Output Summary (Last 24 hours) at 06/12/17 0711  Last data filed at 06/12/17 0445   Gross per 24 hour   Intake           960.42 ml   Output              585 ml   Net           375.42 ml         Physical Exam:  NAD  Abdomen: soft, non-tender, non-distended.  NGT w/ bile output, documented 150 output    Laboratory:  CBC:   Recent Labs  Lab 06/12/17  0422   WBC 10.39   RBC 4.14   HGB 9.6*   HCT 30.1*      MCV 73*   MCH 23.2*   MCHC 31.9*     BMP:   Recent Labs  Lab 06/11/17  0513 06/12/17  0105   GLU 97 148*  148*   * 151*  151*   K 4.2 3.7  3.7   * 117*  117*   CO2 13* 19*  19*   BUN 48* 50*  50*   CREATININE 4.0* 3.9*  3.9*   CALCIUM 9.6 8.9  8.9   MG 1.6  --            ASSESSMENT/PLAN:     SBO  - exam benign  - KUB today  - continue NGT to suction for now

## 2017-06-12 NOTE — NURSING
Patient telemetry change from sinus tachycardia to atrial fibrillation with heart rat between 120s and 130s. MD Hopson notified. Orders received via telephone to consult Cardiologist Elle, administer 5 mg Lopressor IV STAT and perform 12 lead EKG STAT. Patient also had TSH serum level drawn STAT as ordered per MD Hopson. Patient asymptomatic at this time. EKG shows sinus arrythmia with t wave abnormality. Orders performed as received. Continue to monitor.

## 2017-06-12 NOTE — HPI
"86 y.o. AAF with history of MI, COPD? And CHF? who presented to [facility] from [facility]. According to documentation she was transferred for evaluation of confirmed stroke and small bowel obstruction as seen on CT at [facility]. Per EMS, pt had L-sided facial drooping and altered mental status.      Daughters initially brought pt to [place] for increased confusion (x1 day), decreased appetite (x4 days), and weakness. Pt is normally able to ambulate by herself but has been unable to these past few days. Daughters deny emesis.   Patient is awake and alert at the time of interview, denies constipation or emesis states, "I keep harking like I want to throw up". She reports last BM as yesterday, says it was formed and normal. She denies any chronic pain issues or chronic use of narcotics. Of note patient has well distributed macular papular rash on BL thighs and trunk. States her PCP told her that she had the mumps about 1 month ago, she tells me, "they said they can't do nothing about it".     Patient's labs reveal hypernatremia, dehydration with acute on chronic renal failure III. Her physical assessment is negative for acute bowel but bowel sounds completely absent. She has NG tube in place and denies NV abdominal pain or diarrhea. There is slight L facial drooping with slight blinking eyelid delay.     Hospital Course:  The patient was admitted to the hospital for eval and treatment of a small bowel obstruction as well for evaluation of L facial droop. For an unknown reason, the patient was initially sent to the ICU but sent out the same day. MRI of her head was negative for any acute change.  NG tube placed to suction.    "

## 2017-06-12 NOTE — PLAN OF CARE
Problem: Patient Care Overview  Goal: Plan of Care Review  Outcome: Ongoing (interventions implemented as appropriate)  Safety is maintained. Pt is free of falls/trauma/injury and skin is intact. MRI done today and negative for infarcts. Nephrology consulted today. Pt seen by Dr. Chin for decreased UOP and bump in creatinine and BUN. IVF changed to sodium bicarb drip. Renal ultrasound today. Pt remains outstanding for carotid ultrasound and 2D echo. Duonebs started for COPD.

## 2017-06-12 NOTE — PT/OT/SLP EVAL
Physical Therapy  Evaluation    Seema Schreiber   MRN: [id number]   Admitting Diagnosis: Small bowel obstruction    PT Received On: 06/12/17  PT Start Time: 0957     PT Stop Time: 1016    PT Total Time (min): 19 min       Billable Minutes:  Evaluation  19 with OT present    Diagnosis: Small bowel obstruction    Past Medical History:   Diagnosis Date    CHF (congestive heart failure)     COPD (chronic obstructive pulmonary disease)     MI, acute, non ST segment elevation       No past surgical history on file.    Referring physician: Hedy  Date referred to PT: 6/11/17    General Precautions: Standard, fall, NPO  Orthopedic Precautions: N/A   Braces: N/A       Patient History:  Lives With: alone  Living Arrangements: house  Home Accessibility: other (see comments) (no concerns)  Living Environment Comment: Patient states she lives alone but per chart she lives with her daughter and walks with a walker. No family present to confirm PLOF.   Equipment Currently Used at Home: rollator    Previous Level of Function:  Ambulation Skills: needs device  Transfer Skills: needs device    Subjective:  Communicated with nurse Sev[place] prior to session.  Patient agreeable to participate in PT evaluation.   Chief Complaint: Weak due to not eating.   Patient goals: To get back to PLOF.     Pain/Comfort  Pain Rating : 0/10    Objective:   Patient found with: PICC line, oxygen, NG tube, telemetry, almendarez catheter, SCD     Cognitive Exam:  Oriented to: Person    Follows Commands/attention: Follows one-step commands  Communication: clear/fluent  Safety awareness/insight to disability: impaired    Physical Exam:  Postural examination/scapula alignment: Rounded shoulder and Head forward    Skin integrity: Visible skin intact  Edema: None noted     Sensation: Intact    Lower Extremity Range of Motion:  Right Lower Extremity: WFL  Left Lower Extremity: WFL    Lower Extremity Strength:  Right Lower Extremity: 3+/5  Left Lower Extremity:  3+/5     Gross motor coordination: impaired    Functional Mobility:  Bed Mobility   Rolling/Turning to Left     Maximum assistance         Rolling/Turning Right     Maximum assistance         Scooting/Bridging       Dependent; With assist of 2         Supine to Sit     Maximum Assistance         Sit to Supine     Dependent         Transfers   Sit <> Stand Assistance       Unable to perform due to c/o of fatigue         Gait   Gait Distance       Unable to perform due to c/o of fatigue         Balance:   Static Sit: FAIR: Maintains without assist, but unable to take any challenges Patient only tolerated sitting on edge of bed ~12 minutes due to c/o if fatigue.   Dynamic Sit: POOR: N/A    AM-PAC 6 CLICK MOBILITY  How much help from another person does this patient currently need?   1 = Unable, Total/Dependent Assistance  2 = A lot, Maximum/Moderate Assistance  3 = A little, Minimum/Contact Guard/Supervision  4 = None, Modified [employer]/Independent    Turning over in bed (including adjusting bedclothes, sheets and blankets)?: 2  Sitting down on and standing up from a chair with arms (e.g., wheelchair, bedside commode, etc.): 1  Moving from lying on back to sitting on the side of the bed?: 2  Moving to and from a bed to a chair (including a wheelchair)?: 1  Need to walk in hospital room?: 1  Climbing 3-5 steps with a railing?: 1  Total Score: 8     AM-PAC Raw Score CMS G-Code Modifier Level of Impairment Assistance   6 % Total / Unable   7 - 9 CM 80 - 100% Maximal Assist   10 - 14 CL 60 - 80% Moderate Assist   15 - 19 CK 40 - 60% Moderate Assist   20 - 22 CJ 20 - 40% Minimal Assist   23 CI 1-20% SBA / CGA   24 CH 0% Independent/ Mod I     Patient left right sidelying with all lines intact, call button in reach, bed alarm on and nurse notified.    Assessment:   Seema Schreiber is a 86 y.o. female with a medical diagnosis of Small bowel obstruction and presents with decreased strength and endurance for  functional mobility. She was limited in PT session today due to fatigue. She would continue to benefit from PT while in the hospital in order to address deficits listed below and get patient back to OF.     Rehab identified problem list/impairments: Rehab identified problem list/impairments: weakness, impaired endurance, impaired functional mobilty, gait instability, impaired balance, decreased upper extremity function, decreased lower extremity function, decreased safety awareness, impaired cognition, impaired cardiopulmonary response to activity, impaired coordination    Rehab potential is good.    Activity tolerance: Fair    Discharge recommendations: Discharge Facility/Level Of Care Needs:  (TBD based on progress)     Barriers to discharge: Barriers to Discharge: Other (Comment), Decreased caregiver support (patient unable to ambulate at this time)    Equipment recommendations: Equipment Needed After Discharge:  (TBD based on progress)     GOALS:    Physical Therapy Goals        Problem: Physical Therapy Goal    Goal Priority Disciplines Outcome Goal Variances Interventions   Physical Therapy Goal     PT/OT, PT      Description:  Goals to be met by: 17    Patient will increase functional independence with mobility by performin. Supine to sit with supervision  2. Sit to stand transfer with Supervision  3. Bed to chair transfer assess when able  4. Gait assess when able  5. Sitting at edge of bed x30 minutes with Supervision  6. Lower extremity exercise program x30 reps per handout, with supervision                    PLAN:    Patient to be seen 6 x/week to address the above listed problems via gait training, therapeutic activities, therapeutic exercises  Plan of Care expires: 17  Plan of Care reviewed with: patient    Functional Assessment Tool Used: AM PAC   Score: 8  Functional Limitation: Mobility: Walking and moving around  Mobility: Walking and Moving Around Current Status ():  CM  Mobility: Walking and Moving Around Goal Status (): CK     Abigail Melendez, PT, MOT  06/12/2017

## 2017-06-12 NOTE — ASSESSMENT & PLAN NOTE
2/2 dehydration - HR stable, SR on tele -Resolved - stable vitals  -telemetry monitoring- resolved

## 2017-06-12 NOTE — CONSULTS
[facility]  Adult Nutrition  Consult Note    SUMMARY     Recommendations    Recommendation/Intervention: 1) Advance patient to Low Sodium, 2 gm diet, Renal (texture per SLP) 2) Boost Plus vanilla TID 3) If not medically feasible to advance diet, recommend ppn:  Clinimix 4.25/10 @ 80 cc/hr with 250 mL 20% IV Lipids daily, provides 1478 calories, 81.6 g protein, 1920 cc IV Fluid (GIR = 2.1). Monitor Triglycerides weekly. Patient at risk for refeeding syndrome. Monitor lytes - replete as needed.    Goals: 1) Patient will tolerate oral diet or nutrition support  Nutrition Goal Status: new  Communication of RD Recs: reviewed with RN    Continuum of Care Plan     D/C Planning:  Too soon to determine. Will monitor/follow-up.     Reason for Assessment    Reason for Assessment: identified at risk by screening criteria, nurse/nurse practitioner consult  Diagnosis:  (tachycardia, Respiratory distress, ARF, CVA, SBO)  Relevent Medical History: CHF, MI, COPD   General Information Comments: SLP consulted. Unable to assess at this time. Daughter at bedside. Reports poor appetite for the past week. Not sure if lost weight. Suspected SBO. RN reports patient had 2 BMs.      Nutrition Prescription Ordered    Current Diet Order: NPO    Evaluation of Received Nutrients/Fluid Intake    Other Calories (kcal): 306    Energy Calories Required: not meeting needs  Protein Required: not meeting needs  IV Fluid (mL): 1800  Fluid Required: other (see comments) (Net I/O +375.4)     Tolerance: other (see comments) (NPO)     Nutrition Risk Screen     Nutrition Risk Screen: reduced oral intake over the last month    Nutrition/Diet History    Patient Reported Diet/Restrictions/Preferences: general     Food Preferences: No cultural, Gnosticism or ethnic food preferences.    Factors Affecting Nutritional Intake: altered gastrointestinal function, difficulty/impaired swallowing    Labs/Tests/Procedures/Meds    Diagnostic  "Test/Procedure Review: reviewed, pertinent  Pertinent Labs Reviewed: reviewed  Pertinent Labs Comments: Troponin 0.046, , BUN 50, Creat 3.9, GFR 11, Phos 5.4    Pertinent Medications Reviewed: reviewed  Scheduled Meds:   albuterol-ipratropium 2.5mg-0.5mg/3mL  3 mL Nebulization Q6H    aspirin  300 mg Rectal Daily    atorvastatin  40 mg Oral Daily    enoxaparin  30 mg Subcutaneous Daily     Continuous Infusions:   dextrose 5 % and 0.45 % NaCl 75 mL/hr at 06/12/17 1114     PRN Meds:.acetaminophen, hydrALAZINE, ondansetron    Physical Findings    Overall Physical Appearance: on oxygen therapy  Tubes: nasogastric tube (for suction)  Oral/Mouth Cavity: no dental appliances present  Skin: intact    Anthropometrics    Temp: 98.1 °F (36.7 °C)     Height: 5' 4" (162.6 cm)  Weight Method: Bed Scale  Weight: 62.6 kg (138 lb 0.1 oz)  Ideal Body Weight (IBW), Female: 120 lb  % Ideal Body Weight, Female (lb): 115.01 lb  BMI (Calculated): 23.7  BMI Grade: 18.5-24.9 - normal  Weight Change Amount:  (ANNE)  Weight Loss: unintentional    Estimated/Assessed Needs    Weight Used For Calorie Calculations: 62.6 kg (138 lb 0.1 oz)   Energy Need Method: Kcal/kg (1565 - 2191)  RMR (Schuyler-St. Jeor Equation): 1051  Weight Used For Protein Calculations: 62.6 kg (138 lb 0.1 oz)  Protein Requirements: 63 - 75  Fluid Need Method: RDA Method, other (see comments) (or per MD)  CHO Requirement:  (Patient non-diabetic)     Assessment and Plan    Nutrition Diagnosis    Nutrition Problem:  Inadequate energy intake  Etiology:  Difficulty swallowing  Signs/Symptoms:  SLP recommends NPO  Status:  new      Monitor and Evaluation    Food and Nutrient Intake: energy intake, food and beverage intake, parenteral nutrition intake  Food and Nutrient Adminstration: diet order, enteral and parenteral nutrition administration, other (specify) (supplement order)  Physical Activity and Function: nutrition-related ADLs and IADLs  Anthropometric " Measurements: weight, weight change  Biochemical Data, Medical Tests and Procedures: electrolyte and renal panel, gastrointestinal profile, lipid profile  Nutrition-Focused Physical Findings: overall appearance    Nutrition Risk    Level of Risk: other (see comments) (f/u 2x weekly)    Nutrition Follow-Up    RD Follow-up?: Yes

## 2017-06-12 NOTE — CONSULTS
REASON FOR CONSULTATION:  Renal failure, hypernatremia.    HISTORY OF PRESENT ILLNESS:  The patient is an 86-year-old -American lady   with past medical history significant for COPD, chronic congestive heart   failure.  The patient also have a history of MI in the past, who presented to   [facility] because of mental status change and left-sided facial   drooping and the patient was found to have an acute CVA and also the patient   having small-bowel obstruction and the patient was transferred to [facility] for further hospitalization evaluation.  The patient was also found with   elevation of serum BUN and creatinine and serum sodium and we were consulted to   see the patient for evaluation of acute renal failure and hypernatremia.  At   this time, the patient is fairly lethargic, not able to give much any   information, most of the information was obtained from review of the patient's   chart.    PAST MEDICAL HISTORY:  As above.    MEDICATIONS:  The patient is currently receiving aspirin 3 mg per rectal daily,   Lipitor 40 mg p.o. at bedtime and Lovenox 30 mg subQ every 24 hours.  The   patient is also receiving D5 water at 125 mL per hour.    FAMILY HISTORY:  Noncontributory.    SOCIAL HISTORY:  The patient appeared had no recent history of tobacco use,   alcohol abuse or IV drug use.    PHYSICAL EXAMINATION:  GENERAL:  The patient is lethargic, poorly responsive, in no apparent distress.  VITAL SIGNS:  Temperature 98.0 with a blood pressure of 100/59 with a pulse of   116 and respirations at 30.  HEENT:  Pupils equally round and reactive to light.  EOMI.  Oral mucosa is dry.  HEART:  Regular rhythm and tachycardia.  LUNGS:  Clear to auscultation and percussion bilaterally.  ABDOMEN:  Soft.  Hypoactive bowel sounds and nontender.  EXTREMITIES:  Without any edema.    LABORATORY DATA:  WBC is 9.02, hemoglobin 10.9, hematocrit 34.5 and platelet   count 323.  Iron is 45, TIBC is 172 and iron  saturation is 26.  Sodium is 154,   potassium 4.2, chloride 123, CO2 of 13, BUN 48, creatinine is 4.0, magnesium   1.6, phosphorus 5.4, albumin is 2.3, AST 50, and ALT is 13.  Urinalysis showed   2+ protein and 3+ blood and rbc's greater than 100 and wbc's 25 per high power   field and urine sodium is 23.    IMPRESSION:  1.  Acute kidney injury.  Clinically, the patient appeared to be prerenal.  2.  Hypernatremia secondary to free water deficit.  3.  Metabolic acidosis.  4.  Small bowel obstruction.  5.  CVA.  6.  COPD.  7.  Anemia of chronic disease.    DISCUSSION AND RECOMMENDATION:  At this time, we will give the patient IV   fluids.  We will obtain a renal ultrasound and we will also added bicarb and IV   fluid in light of metabolic acidosis.  I will watch the patient's renal function   closely and we will follow the patient with you.    Thank you for the courtesy of the consultation and allowing us to participate in   the patient's care.      COURTNEY  dd: 06/11/2017 15:54:37 (CDT)  td: 06/11/2017 21:12:07 (CDT)  Doc ID   #[id number]  Job ID #[id number]    CC:

## 2017-06-12 NOTE — PROGRESS NOTES
"[facility] Medicine  Progress Note    Patient Name: Seema Schreiber  MRN: [id number]  Patient Class: IP- Inpatient   Admission Date: 6/11/2017  Length of Stay: 1 days  Attending Physician: Sravan Hopson MD  Primary Care Provider: Primary Doctor No        Subjective:     Principal Problem:Small bowel obstruction    HPI:  Seema Schreiber is a 86 y.o. AAF with history of MI, COPD? And CHF? who presented to [facility] from [facility]. According to documentation she was transferred for evaluation of confirmed stroke and small bowel obstruction as seen on CT at [facility]. Per EMS, pt had L-sided facial drooping and altered mental status.     Daughters initially brought pt to [facility] for increased confusion (x1 day), decreased appetite (x4 days), and weakness. Pt is normally able to ambulate by herself but has been unable to these past few days. Daughters deny emesis.   Patient is awake and alert at the time of interview, denies constipation or emesis states, "I keep harking like I want to throw up". She reports last BM as yesterday, says it was formed and normal. She denies any chronic pain issues or chronic use of narcotics. Of note patient has well distributed macular papular rash on BL thighs and trunk. States her PCP told her that she had the mumps about 1 month ago, she tells me, "they said they can't do nothing about it".    Patient's labs reveal hypernatremia, dehydration with acute on chronic renal failure III. Her physical assessment is negative for acute bowel but bowel sounds completely absent. She has NG tube in place and denies NV abdominal pain or diarrhea. There is slight L facial drooping with slight blinking eyelid delay.    Hospital Course:  The patient was admitted to the hospital for eval and treatment of a small bowel obstruction as well for evaluation of L facial droop. For an unknown reason, the patient was initially sent to the ICU but sent out " the same day. MRI of her head was negative for any acute change.  NG tube placed to suction.      Interval History:  Doing well. No complaints. No BM. No flatus.    Review of Systems   Constitutional: Negative for activity change.   Respiratory: Negative for chest tightness and shortness of breath.    Cardiovascular: Negative for chest pain.   Gastrointestinal: Negative for abdominal pain, diarrhea and vomiting.     Objective:     Vital Signs (Most Recent):  Temp: 98.1 °F (36.7 °C) (06/12/17 0830)  Pulse: 103 (06/12/17 0830)  Resp: (!) 24 (06/12/17 0830)  BP: (!) 96/56 (06/12/17 0830)  SpO2: 97 % (06/12/17 0830) Vital Signs (24h Range):  Temp:  [97.4 °F (36.3 °C)-99.3 °F (37.4 °C)] 98.1 °F (36.7 °C)  Pulse:  [] 103  Resp:  [19-40] 24  SpO2:  [93 %-100 %] 97 %  BP: ()/(53-73) 96/56     Weight: 62.6 kg (138 lb)  Body mass index is 23.69 kg/m².    Intake/Output Summary (Last 24 hours) at 06/12/17 1045  Last data filed at 06/12/17 0445   Gross per 24 hour   Intake           960.42 ml   Output              585 ml   Net           375.42 ml      Physical Exam   Constitutional: She appears well-developed and well-nourished.   HENT:   Head: Normocephalic.   Cardiovascular: Normal rate.    Pulmonary/Chest: Effort normal.   Abdominal: Soft. She exhibits no distension. There is no tenderness.   Hypoactive BS   Vitals reviewed.      Significant Labs:   BMP:   Recent Labs  Lab 06/11/17  0513 06/12/17  0105   GLU 97 148*  148*   * 151*  151*   K 4.2 3.7  3.7   * 117*  117*   CO2 13* 19*  19*   BUN 48* 50*  50*   CREATININE 4.0* 3.9*  3.9*   CALCIUM 9.6 8.9  8.9   MG 1.6  --      CBC:   Recent Labs  Lab 06/11/17  0513 06/12/17  0422   WBC 9.02 10.39   HGB 10.9* 9.6*   HCT 34.5* 30.1*    250       Magnesium:    Recent Labs  Lab 06/11/17  0513   MG 1.6       Significant Imaging:  Abdominal x-ray reviewed.     Assessment/Plan:      * Small bowel obstruction    Acute nausea without vomiting at  this time - NG tube in place with metabolic alkalosis; lactic acid normal  -Awaiting surgery recommendation  -Continue NPO status  -IV fluids D50.45/NS cautiously (2/2 heart failure/obtain 2D echo)  -strict I&O's            Weakness    Lives with daughter. Walks with rolling walker. PT/OT consulted. May need SNF in the end.           Severe malnutrition    No acute issues.  Will start on TPN if remains NPO.           Tachycardia    2/2 dehydration - HR stable, SR on tele -Resolved - stable vitals  -telemetry monitoring- resolved          Facial droop    Acute according to family per note; rash on trunk and thighs  Neurology following  Awaiting MRI- negative.   Alert and oriented  RPR, sed rate, crp        Anemia, chronic disease    No overt signs of bleeding / Anemia studies liekly 2/2 renal disease  Anemia studies  Nephrology consult as above        Elevated troponin I level    Likely 2/2 renal - denies chest pain          Chronic combined systolic and diastolic heart failure    Patient reports history, no CP or FVO appreciated on exam  -2D echo-pending.   -Strict I&O's          Acute renal failure    Unclear etiology; creatine = 4.0/gfr=10 - NV with Bowel obstruction; albumin 2.3 likely nutrition component with bowel obstruction  IV fluids  Strict I&O's  Monitor chemistry          Hypernatremia    Patient is at baseline mentation  D5/0.45NS at 75 cc  CKD correction per nephrology  NA down to 151. Continue slow correction           VTE Risk Mitigation         Ordered     enoxaparin injection 30 mg  Daily     Route:  Subcutaneous        06/11/17 1055     Medium Risk of VTE  Once      06/11/17 0856     Place sequential compression device  Until discontinued      06/11/17 0856     Place ISHMAEL hose  Until discontinued      06/11/17 0856      PT/OT eval. Surgery eval. May need TPN by tomorrow if no improvement.     Sravan Smith MD  Department of Hospital Medicine   [facility]

## 2017-06-12 NOTE — PLAN OF CARE
Problem: SLP Goal  Goal: SLP Goal  Short term goals:  1) assess po intake when able  2) oral motor exercises  Outcome: Ongoing (interventions implemented as appropriate)  Patient opened eyes when named called, followed simple commands, no voicing elicited, nodded head yes/no to simple questions.  Unable to fully assess po intake at this time, no volitional swallow observed, did tolerate oral hygiene and mouth swabs with noted pursing and sucking observed.   Will continue to assess

## 2017-06-12 NOTE — PLAN OF CARE
06/12/17 1320   Discharge Assessment   Assessment Type Discharge Planning Assessment   Confirmed/corrected address and phone number on facesheet? Yes   Assessment information obtained from? Patient   Prior to hospitilization cognitive status: Alert/Oriented   Prior to hospitalization functional status: Independent;Assistive Equipment   Current cognitive status: Alert/Oriented   Current Functional Status: Independent;Assistive Equipment   Arrived From home or self-care   Lives With alone;child(magnolia), adult  (Pt stays in a double house. Daughter Enedelia stays in the front and pt stays in the back.)   Able to Return to Prior Arrangements yes   Is patient able to care for self after discharge? Yes   How many people do you have in your home that can help with your care after discharge? 2   Who are your caregiver(s) and their phone number(s)? Daughters   Patient's perception of discharge disposition home or selfcare   Readmission Within The Last 30 Days no previous admission in last 30 days   Patient currently receives any other outside agency services? Yes   How many hours a day does the patient receive services? 4   Name and contact number of agency or person providing outside services PCA worker. Daughter stated will  on today.   Is it the patient/care giver preference to resume care with the current outside agency? Yes   Equipment Currently Used at Home walker, rolling;oxygen;bedside commode;rollator  (Nebulizer)   Do you have any problems affording any of your prescribed medications? No   Is the patient taking medications as prescribed? yes   Do you have any financial concerns preventing you from receiving the healthcare you need? No   Does the patient have transportation to healthcare appointments? Yes   Transportation Available family or friend will provide  (Gianna Mcdaniels)   On Dialysis? No   Are there any open cases? No   Discharge Plan A Home;Home with family   Discharge Plan B Other  (PT/OT recs)    Patient/Family In Agreement With Plan yes     Pharmacy:      [id number] - [place] - [address] AT [facility] [address]  [address] [address]  Phone: [phone] Fax: [phone]      Appointments:    Prefer Mondays anytime and PCP anytime.

## 2017-06-12 NOTE — ASSESSMENT & PLAN NOTE
Asymptomatic  Doubt ACS  Likely d/t renal failure  Echo pending  No plan for isch eval at this juncture

## 2017-06-12 NOTE — HOSPITAL COURSE
The patient was admitted to the hospital for eval and treatment of a small bowel obstruction, left facial droop, hypovolemic hypernatremia and pre-renal SAVITA (Cr 4). For an unknown reason, the patient was initially sent to the ICU but sent out the same day. MRI of her head was negative for any acute change. Per labs, patient also noted to be hypothyroid (TSH 7.4, free T4 0.8) and mild troponemia. 2D Echo revealed severe aortic valve stenosis and LVEF 25%. Per daughter, this is an old issue. Initiated on rectal ASA, howeve unable to start on BB or stating due to SBO at the time. Chest pain free. Troponemia likely demand and underlying renal dysfunction. Surgery, cardiology and nephrology consulted. NG tube placed to suction, empiric cipro/flagyl and initiated on IVFs. Small obstruction and hypernatremia eventually resolved, however severe dysphagia was noted to be an issue and is persistent. MRI of brain without acute stroke, but rather chronic cerebrovascular disease. SAVITA also improved with IVFs (gently infused given Syst HF) NG tube had already been removed by surgery. Patient insisted on not putting NG tube back in despite dysphagia. I suggested starting tube feeds but patient would like to continue with TPN. GI consulted for PEG on 6/16. Surprisingly, patient was able to properly eat pureed diet with thin liquids. Plans for PEG placement canceled. Developed fever. Blood culture 2/4 bottles with MRSA. Right groin central line removed and a right EJ (18G) placed. TPN discontinued. Fevers resolved and hemodynamically improved soon after. Repeat BCx 6/18 negative. On vancomycin dosed per daily levels. Then developed multiple loose stools (green), which resolved on its own. SAVITA afterwards. Improved with normal saline infusion. PO intake inconsistent. Hypoglycemic and symptomatic. Was started on D5% infusion (normal saline caused mild hypernatremia and hyperchloremic metabolic acidosis). On the morning of 6/20/2017 she  was noted to be tachycardic and then developed coughing. Nursing presented to bedside to assess the patient. She then became unresponsive. She was pulseless. ACLS protocol was initiated. She underwent ACLS for 20 minutes to no avail. Time of death was called at 9:04. When she was intubated, a large tissue plug of mucus and dried blood first had to be suctioned. It is believe she first became hypoxic resulting in her death.

## 2017-06-12 NOTE — PLAN OF CARE
Problem: Physical Therapy Goal  Goal: Physical Therapy Goal  Goals to be met by: 17    Patient will increase functional independence with mobility by performin. Supine to sit with supervision  2. Sit to stand transfer with Supervision  3. Bed to chair transfer assess when able  4. Gait assess when able  5. Sitting at edge of bed x30 minutes with Supervision  6. Lower extremity exercise program x30 reps per handout, with supervision       Patient would continue to benefit from PT while in the hospital in order to get back to PLOF.

## 2017-06-12 NOTE — PLAN OF CARE
Problem: Occupational Therapy Goal  Goal: Occupational Therapy Goal  Goals to be met by: 6/26/17    Patient will increase functional independence with ADLs by performing:    Feeding with to be assessed as able.  UE Dressing with Minimal Assistance.  LE Dressing with Moderate Assistance.  Grooming while seated with Set-up Assistance and Stand-by Assistance.  Toileting from bedside commode with Stand-by Assistance for hygiene and clothing management.   Supine to sit with Minimal Assistance.  Stand pivot transfers with Minimal Assistance.  Toilet transfer to bedside commode with Minimal Assistance.  Upper extremity exercise program x10 reps per handout, with assistance as needed.    Outcome: Ongoing (interventions implemented as appropriate)  Patient will benefit from OT to address functional deficits.

## 2017-06-12 NOTE — PROGRESS NOTES
[facility]  Neurology  Progress Note    Patient Name: Seema Schreiber  MRN: [id number]  Admission Date: 6/11/2017  Hospital Length of Stay: 1 days  Code Status: Full Code   Attending Provider: Sravan Hopson MD  Primary Care Physician: Primary Doctor No   Principal Problem:Small bowel obstruction    Subjective:     Interval History: 87 y/o female with medical Hx as listed below brought to ED for increased confusion and facial droop. Symptoms had been present sonce the day prior to admission. Pt was seen at an outside facility where she was found to be hypernatremic, with a small bowel obstruction, and a reported stroke. No reports of lateralized weakness except for facial droop. Pt is alert and able to follow most commands at moment of evaluation.    -6/12/17: Pt has been transferred out of ICU. NGT in place. Still somewhat lethargic.    Current Neurological Medications:     Current Facility-Administered Medications   Medication Dose Route Frequency Provider Last Rate Last Dose    acetaminophen tablet 650 mg  650 mg Oral Q8H PRN Indira Benedict MD        albuterol-ipratropium 2.5mg-0.5mg/3mL nebulizer solution 3 mL  3 mL Nebulization Q6H Ricco Knott MD   3 mL at 06/12/17 0749    aspirin suppository 300 mg  300 mg Rectal Daily Ricco Knott MD        atorvastatin tablet 40 mg  40 mg Oral Daily Ricco Knott MD        dextrose 5 % and 0.45 % NaCl infusion   Intravenous Continuous Sravna Hopson MD        enoxaparin injection 30 mg  30 mg Subcutaneous Daily Ricco Knott MD   30 mg at 06/11/17 1757    hydrALAZINE injection 10 mg  10 mg Intravenous Q4H PRN Ricco Knott MD        ondansetron injection 4 mg  4 mg Intravenous Q12H PRN Indira Benedict MD           Review of Systems   Not answering questions    Objective:     Vital Signs (Most Recent):  Temp: 98.1 °F (36.7 °C) (06/12/17 0830)  Pulse: 103 (06/12/17 0830)  Resp: (!) 24 (06/12/17  0830)  BP: (!) 96/56 (06/12/17 0830)  SpO2: 97 % (06/12/17 0830) Vital Signs (24h Range):  Temp:  [97.4 °F (36.3 °C)-99.3 °F (37.4 °C)] 98.1 °F (36.7 °C)  Pulse:  [] 103  Resp:  [19-40] 24  SpO2:  [93 %-100 %] 97 %  BP: ()/(53-73) 96/56     Weight: 62.6 kg (138 lb)  Body mass index is 23.69 kg/m².    Physical Exam  Constitutional: well-developed  Head: normocephalic  Eyes: conjunctivae/corneas clear.  Nose: no discharge, no epistaxis  Heart: regular rate and rhythm.  Abdomen: pain to palaption  Extremities: no edema  Neurologic: Mental status: alert; oriented to self, place, time; follows commands   Cranial nerves: pupils are 3 mm and reactive; EOMI; no nystagmus; right upper and lower facial weakness; tongue protrudes midline; palate elevates symmetrically  Strength: able to elevate UE's and LE's against gravity; no drift       Significant Labs:   CBC:   Recent Labs  Lab 06/11/17  0513 06/12/17  0422   WBC 9.02 10.39   HGB 10.9* 9.6*   HCT 34.5* 30.1*    250     CMP:   Recent Labs  Lab 06/11/17  0513 06/12/17  0105   GLU 97 148*  148*   * 151*  151*   K 4.2 3.7  3.7   * 117*  117*   CO2 13* 19*  19*   BUN 48* 50*  50*   CREATININE 4.0* 3.9*  3.9*   CALCIUM 9.6 8.9  8.9   MG 1.6  --    PROT 6.3 5.3*  5.3*   ALBUMIN 2.3* 2.1*  2.1*   BILITOT 0.5 0.4  0.4   ALKPHOS 83 65  65   AST 50* 33  33   ALT 13 9*  9*   ANIONGAP 18* 15  15   EGFRNONAA 10* 10*  10*       Significant Imaging:    Assessment and Plan:     87 y/o female consulted for possible stroke     1. 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 No definite lateralized weakness or neglect on exam.                      -Sodium slowly improving. Abnormal renal function; renal consulted.                     -Will continue to follow.    Active Diagnoses:    Diagnosis Date Noted POA    PRINCIPAL PROBLEM:  Small  bowel obstruction [K56.69] 06/11/2017 Yes    Severe malnutrition [E43] 06/12/2017 Yes    Weakness [R53.1] 06/12/2017 Yes    Hypernatremia [E87.0] 06/11/2017 Yes    Acute renal failure [N17.9] 06/11/2017 Yes    Chronic combined systolic and diastolic heart failure [I50.42] 06/11/2017 Yes    Elevated troponin I level [R74.8] 06/11/2017 Yes    Anemia, chronic disease [D63.8] 06/11/2017 Yes    Facial droop [R29.810] 06/11/2017 Yes    Tachycardia [R00.0] 06/11/2017 Yes      Problems Resolved During this Admission:    Diagnosis Date Noted Date Resolved POA       VTE Risk Mitigation         Ordered     enoxaparin injection 30 mg  Daily     Route:  Subcutaneous        06/11/17 1055     Medium Risk of VTE  Once      06/11/17 0856     Place sequential compression device  Until discontinued      06/11/17 0856     Place ISHMAEL hose  Until discontinued      06/11/17 0856          James Alfred MD  Neurology  [facility]

## 2017-06-12 NOTE — PT/OT/SLP EVAL
Speech Language Pathology  Evaluation    Seema Schreiber   MRN: [id number]   Admitting Diagnosis: Small bowel obstruction    Diet recommendations: Solid Diet Level: NPO  Liquid Diet Level: NPO     SLP Treatment Date: 06/12/17  Speech Start Time: 1221     Speech Stop Time: 1241     Speech Total (min): 20 min       TREATMENT BILLABLE MINUTES:  Eval Swallow and Oral Function 20    Diagnosis: Small bowel obstruction      Past Medical History:   Diagnosis Date    CHF (congestive heart failure)     COPD (chronic obstructive pulmonary disease)     MI, acute, non ST segment elevation      No past surgical history on file.    Has the patient been evaluated by SLP for swallowing? : No  Keep patient NPO?: Yes   General Precautions: Standard, fall, NPO    Current Respiratory Status: nasal cannula     Social Hx: Patient lives with with daughter    Prior diet: dental soft wit thin liquids  Significant decrease in po intake prior to hospital    Occupational/hobbies/homemaking:    Subjective:  Patient opened eyes when named called, followed simple commands, no voicing elicited, nodded head yes/no to simple questions.  Unable to fully assess po intake at this time, no volitional swallow observed, did tolerate oral hygiene and mouth swabs with noted pursing and sucking observed.   Will continue to assess  Patient goals:         Objective:   Patient found with: PICC line, oxygen, NG tube, telemetry    Oral Musculature Evaluation  Oral Musculature: general weakness  Dentition:  (edentulous)  Mucosal Quality: dry  Mandibular Strength and Mobility: flaccid  Oral Labial Strength and Mobility: impaired coordination, impaired retraction, impaired pursing  Lingual Strength and Mobility: impaired protrusion, impaired strength  Buccal Strength and Mobility: flaccid  Volitional Cough:  (unable to perform)  Volitional Swallow:  (unable to elicit)     Bedside Swallow Eval:  Consistencies Assessed: unable to fully assess at this time  Oral Phase:    Pharyngeal Phase:     Additional Treatment:      FIM:        Assessment:  Seema Schreiber is a 86 y.o. female with a medical diagnosis of Small bowel obstruction and presents with           Discharge recommendations: Discharge Facility/Level Of Care Needs:  (unknown at this time)     Goals:    SLP Goals        Problem: SLP Goal    Goal Priority Disciplines Outcome   SLP Goal     SLP Ongoing (interventions implemented as appropriate)   Description:  Short term goals:  1) assess po intake when able  2) oral motor exercises                     Plan:   Patient to be seen Therapy Frequency: 3 x/week   Plan of Care expires: 06/16/17  Plan of Care reviewed with: patient, daughter  SLP Follow-up?: Yes  SLP - Next Visit Date: 06/13/17           Michelle Reynoso CCC-SLP  06/12/2017

## 2017-06-12 NOTE — SUBJECTIVE & OBJECTIVE
Interval History:  Doing well. No complaints. No BM. No flatus.    Review of Systems   Constitutional: Negative for activity change.   Respiratory: Negative for chest tightness and shortness of breath.    Cardiovascular: Negative for chest pain.   Gastrointestinal: Negative for abdominal pain, diarrhea and vomiting.     Objective:     Vital Signs (Most Recent):  Temp: 98.1 °F (36.7 °C) (06/12/17 0830)  Pulse: 103 (06/12/17 0830)  Resp: (!) 24 (06/12/17 0830)  BP: (!) 96/56 (06/12/17 0830)  SpO2: 97 % (06/12/17 0830) Vital Signs (24h Range):  Temp:  [97.4 °F (36.3 °C)-99.3 °F (37.4 °C)] 98.1 °F (36.7 °C)  Pulse:  [] 103  Resp:  [19-40] 24  SpO2:  [93 %-100 %] 97 %  BP: ()/(53-73) 96/56     Weight: 62.6 kg (138 lb)  Body mass index is 23.69 kg/m².    Intake/Output Summary (Last 24 hours) at 06/12/17 1045  Last data filed at 06/12/17 0445   Gross per 24 hour   Intake           960.42 ml   Output              585 ml   Net           375.42 ml      Physical Exam   Constitutional: She appears well-developed and well-nourished.   HENT:   Head: Normocephalic.   Cardiovascular: Normal rate.    Pulmonary/Chest: Effort normal.   Abdominal: Soft. She exhibits no distension. There is no tenderness.   Hypoactive BS   Vitals reviewed.      Significant Labs:   BMP:   Recent Labs  Lab 06/11/17  0513 06/12/17  0105   GLU 97 148*  148*   * 151*  151*   K 4.2 3.7  3.7   * 117*  117*   CO2 13* 19*  19*   BUN 48* 50*  50*   CREATININE 4.0* 3.9*  3.9*   CALCIUM 9.6 8.9  8.9   MG 1.6  --      CBC:   Recent Labs  Lab 06/11/17  0513 06/12/17  0422   WBC 9.02 10.39   HGB 10.9* 9.6*   HCT 34.5* 30.1*    250       Magnesium:    Recent Labs  Lab 06/11/17  0513   MG 1.6       Significant Imaging:  Abdominal x-ray reviewed.

## 2017-06-12 NOTE — ASSESSMENT & PLAN NOTE
Patient is at baseline mentation  D5/0.45NS at 75 cc  CKD correction per nephrology  NA down to 151. Continue slow correction

## 2017-06-12 NOTE — ASSESSMENT & PLAN NOTE
Acute according to family per note; rash on trunk and thighs  Neurology following  Awaiting MRI- negative.   Alert and oriented  RPR, sed rate, crp

## 2017-06-12 NOTE — CONSULTS
"  [facility]  Cardiology  Consult Note    Patient Name: Seema Schreiber  MRN: [id number]  Admission Date: 6/11/2017  Hospital Length of Stay: 1 days  Code Status: Full Code   Attending Provider: Brian Hopson MD   Consulting Provider: Thang French MD  Primary Care Physician: Dajuan Guthrie MD  Principal Problem:Small bowel obstruction    Patient information was obtained from patient and ER records.     Inpatient consult to Cardiology  Consult performed by: THANG FRENCH  Consult ordered by: BRIAN HOPSON  Reason for consult: elev trop        Subjective:     Chief Complaint:  Facial droop/SBO     HPI:   86 y.o. AAF with history of MI, COPD? And CHF? who presented to [facility] from [facility]. According to documentation she was transferred for evaluation of confirmed stroke and small bowel obstruction as seen on CT at [facility]. Per EMS, pt had L-sided facial drooping and altered mental status.      Daughters initially brought pt to [place] for increased confusion (x1 day), decreased appetite (x4 days), and weakness. Pt is normally able to ambulate by herself but has been unable to these past few days. Daughters deny emesis.   Patient is awake and alert at the time of interview, denies constipation or emesis states, "I keep harking like I want to throw up". She reports last BM as yesterday, says it was formed and normal. She denies any chronic pain issues or chronic use of narcotics. Of note patient has well distributed macular papular rash on BL thighs and trunk. States her PCP told her that she had the mumps about 1 month ago, she tells me, "they said they can't do nothing about it".     Patient's labs reveal hypernatremia, dehydration with acute on chronic renal failure III. Her physical assessment is negative for acute bowel but bowel sounds completely absent. She has NG tube in place and denies NV abdominal pain or diarrhea. There is slight L " facial drooping with slight blinking eyelid delay.     Hospital Course:  The patient was admitted to the hospital for eval and treatment of a small bowel obstruction as well for evaluation of L facial droop. For an unknown reason, the patient was initially sent to the ICU but sent out the same day. MRI of her head was negative for any acute change.  NG tube placed to suction.       Past Medical History:   Diagnosis Date    CHF (congestive heart failure)     COPD (chronic obstructive pulmonary disease)     MI, acute, non ST segment elevation        No past surgical history on file.    Review of patient's allergies indicates:   Allergen Reactions    Codeine     Penicillins        No current facility-administered medications on file prior to encounter.      No current outpatient prescriptions on file prior to encounter.     Family History     None        Social History Main Topics    Smoking status: Unknown If Ever Smoked    Smokeless tobacco: Not on file    Alcohol use Not on file    Drug use: Unknown    Sexual activity: Not on file     Review of Systems   Unable to perform ROS: other   ?senile dementia, pt's family at bedside giuves most answers with pt agreeing.    Objective:     Vital Signs (Most Recent):  Temp: 98.1 °F (36.7 °C) (06/12/17 0830)  Pulse: 99 (06/12/17 1453)  Resp: (!) 28 (06/12/17 1453)  BP: (!) 96/56 (06/12/17 0830)  SpO2: 97 % (06/12/17 1453) Vital Signs (24h Range):  Temp:  [97.4 °F (36.3 °C)-99.3 °F (37.4 °C)] 98.1 °F (36.7 °C)  Pulse:  [] 99  Resp:  [19-37] 28  SpO2:  [93 %-100 %] 97 %  BP: ()/(53-73) 96/56     Weight: 62.6 kg (138 lb 0.1 oz)  Body mass index is 23.69 kg/m².    SpO2: 97 %  O2 Device (Oxygen Therapy): nasal cannula      Intake/Output Summary (Last 24 hours) at 06/12/17 1624  Last data filed at 06/12/17 0445   Gross per 24 hour   Intake           460.42 ml   Output              360 ml   Net           100.42 ml       Lines/Drains/Airways     Peripherally  Inserted Central Catheter Line                 PICC Triple Lumen 06/11/17 0456  1 day          Drain                 NG/OG Tube 06/11/17 0737 nasogastric 16 Fr. Right nostril 1 day         Urethral Catheter 06/11/17 1 day                Physical Exam   Constitutional:   Chr ill elderly woman, NAD   HENT:   Head: Normocephalic and atraumatic.   Eyes: Conjunctivae and EOM are normal. Pupils are equal, round, and reactive to light. No scleral icterus.   Neck: No JVD present. No tracheal deviation present. No thyromegaly present.   Cardiovascular: Normal rate and regular rhythm.  Exam reveals distant heart sounds. Exam reveals no gallop and no friction rub.    No murmur heard.  Pulmonary/Chest: Effort normal and breath sounds normal. No respiratory distress. She has no wheezes.   Abdominal: Soft. She exhibits no distension. There is no tenderness.   Decreased BS   Musculoskeletal: She exhibits no edema or tenderness.   Neurological: She is alert.   Facial droop   Skin: Skin is warm and dry.   Psychiatric: She has a normal mood and affect. Her behavior is normal.       Current Medications:   albuterol-ipratropium 2.5mg-0.5mg/3mL  3 mL Nebulization Q6H    aspirin  300 mg Rectal Daily    atorvastatin  40 mg Oral Daily    enoxaparin  30 mg Subcutaneous Daily      dextrose 5 % and 0.45 % NaCl 75 mL/hr at 06/12/17 1114     acetaminophen, hydrALAZINE, ondansetron    Laboratory:  CBC:    Recent Labs  Lab 06/11/17  0513 06/12/17  0422   WHITE BLOOD CELL COUNT 9.02 10.39   HEMOGLOBIN 10.9 L 9.6 L   HEMATOCRIT 34.5 L 30.1 L   PLATELETS 323 250       CHEMISTRIES:    Recent Labs  Lab 06/11/17  0513 06/12/17  0105   GLUCOSE 97 148 H  148 H   SODIUM 154 H 151 H  151 H   POTASSIUM 4.2 3.7  3.7   BUN BLD 48 H 50 H  50 H   CREATININE 4.0 H 3.9 H  3.9 H   EGFR IF  11 A 11 A  11 A   EGFR IF NON- 10 A 10 A  10 A   CALCIUM 9.6 8.9  8.9   MAGNESIUM 1.6  --        CARDIAC BIOMARKERS:    Recent  Labs  Lab 06/11/17  1235 06/11/17  1538 06/12/17  0105   TROPONIN I 0.052 H 0.056 H 0.046 H       COAGS:        LIPIDS/LFTS:    Recent Labs  Lab 06/11/17  0513 06/11/17  1235 06/12/17  0105   CHOLESTEROL  --  113 L  --    TRIGLYCERIDES  --  191 H  --    HDL  --  13 L  --    LDL CHOLESTEROL  --  61.8 L  --    NON-HDL CHOLESTEROL  --  100  --    AST 50 H  --  33  33   ALT 13  --  9 L  9 L           Diagnostic Results:  ECG (personally reviewed tracings):   6/11/17 0521 , inflat ST abnl ?isch  6/12/17 1345 SR 96, NSSTTW changes    Chest X-Ray (personally reviewed image(s)): 6/11/17 NAD    Echo: pending      Assessment and Plan:     Elevated troponin I level    Asymptomatic  Doubt ACS  Likely d/t renal failure  Echo pending  No plan for isch eval at this juncture        * Small bowel obstruction    Per IM/GI        Facial droop    Per IM/Neuro            VTE Risk Mitigation         Ordered     enoxaparin injection 30 mg  Daily     Route:  Subcutaneous        06/11/17 1055     Medium Risk of VTE  Once      06/11/17 0856     Place sequential compression device  Until discontinued      06/11/17 0856     Place ISHMAEL hose  Until discontinued      06/11/17 0856          Thank you for your consult. I will follow-up with patient. Please contact us if you have any additional questions.    Thang Turner MD  Cardiology   [facility]

## 2017-06-12 NOTE — ASSESSMENT & PLAN NOTE
Unclear etiology; creatine = 4.0/gfr=10 - NV with Bowel obstruction; albumin 2.3 likely nutrition component with bowel obstruction  IV fluids  Strict I&O's  Monitor chemistry

## 2017-06-12 NOTE — PROGRESS NOTES
Renal Progress Note      Date of Admission: 6/11/2017  4:46 AM      ROS:  Unable to obtain due to clinical condition      Vitals:    06/12/17 1453   BP:    Pulse: 99   Resp: (!) 28   Temp:      I/O last 3 completed shifts:  In: 960.4 [I.V.:960.4]  Out: 585 [Urine:435; Drains:150]  No intake/output data recorded.      Constitutional: Elderly female, thin, NAD  HENT: NGT in place  Head: Normocephalic.   Cardiovascular: Normal rate.  S1-S2  Pulmonary: CTA  Abdominal: Soft. She exhibits no distension, there is no tenderness Hypoactive BS   Neurologic: confused    Laboratories:      Recent Labs  Lab 06/12/17  0422   WBC 10.39   RBC 4.14   HGB 9.6*   HCT 30.1*      MCV 73*   MCH 23.2*   MCHC 31.9*       Recent Labs  Lab 06/12/17  0105   CALCIUM 8.9  8.9   PROT 5.3*  5.3*   *  151*   K 3.7  3.7   CO2 19*  19*   *  117*   BUN 50*  50*   CREATININE 3.9*  3.9*   ALKPHOS 65  65   ALT 9*  9*   AST 33  33   BILITOT 0.4  0.4     Iron                    45   Iron      TIBC                    172   TIBC     Saturated Iron                    26   Saturated Iron     Transferrin                    116  116   Transferrin     Ferritin                    182   Ferritin     Folate                    17.9   Folate     Vitamin B-12                    888   Vitamin       Phosphorus                    5.4   Phosphorus      Magnesium                    1.6   Magnesium      Microbiology Results (last 7 days)     Procedure Component Value Units Date/Time    Urine culture [[id number]] Collected:  06/11/17 1133    Order Status:  Completed Specimen:  Urine from Urine, Catheterized Updated:  06/12/17 1001     Urine Culture, Routine No growth to date          Assessment:    87 y/o AAF admitted with:    Small bowel obstruction.  Acute kidney injury (unknown baseline creatinine)  Hypernatremia secondary to free water deficit.  Metabolic acidosis.  Anemia of chronic disease  Hypoalbuminemia  Hx.  CVA.  Hx. COPD.    Plan:    - NG suction  - NPO  - Cheung to gravity  - Recheck Mg++ and replace PRN  - Adjust IVFs to provide free H2O  - f/u BMP  - Surgery following  - If NPO expected to last more than 5 days consider TPN  .

## 2017-06-12 NOTE — SUBJECTIVE & OBJECTIVE
Past Medical History:   Diagnosis Date    CHF (congestive heart failure)     COPD (chronic obstructive pulmonary disease)     MI, acute, non ST segment elevation        No past surgical history on file.    Review of patient's allergies indicates:   Allergen Reactions    Codeine     Penicillins        No current facility-administered medications on file prior to encounter.      No current outpatient prescriptions on file prior to encounter.     Family History     None        Social History Main Topics    Smoking status: Unknown If Ever Smoked    Smokeless tobacco: Not on file    Alcohol use Not on file    Drug use: Unknown    Sexual activity: Not on file     Review of Systems   Unable to perform ROS: other   ?senile dementia, pt's family at bedside giuves most answers with pt agreeing.    Objective:     Vital Signs (Most Recent):  Temp: 98.1 °F (36.7 °C) (06/12/17 0830)  Pulse: 99 (06/12/17 1453)  Resp: (!) 28 (06/12/17 1453)  BP: (!) 96/56 (06/12/17 0830)  SpO2: 97 % (06/12/17 1453) Vital Signs (24h Range):  Temp:  [97.4 °F (36.3 °C)-99.3 °F (37.4 °C)] 98.1 °F (36.7 °C)  Pulse:  [] 99  Resp:  [19-37] 28  SpO2:  [93 %-100 %] 97 %  BP: ()/(53-73) 96/56     Weight: 62.6 kg (138 lb 0.1 oz)  Body mass index is 23.69 kg/m².    SpO2: 97 %  O2 Device (Oxygen Therapy): nasal cannula      Intake/Output Summary (Last 24 hours) at 06/12/17 1624  Last data filed at 06/12/17 0445   Gross per 24 hour   Intake           460.42 ml   Output              360 ml   Net           100.42 ml       Lines/Drains/Airways     Peripherally Inserted Central Catheter Line                 PICC Triple Lumen 06/11/17 0456  1 day          Drain                 NG/OG Tube 06/11/17 0737 nasogastric 16 Fr. Right nostril 1 day         Urethral Catheter 06/11/17 1 day                Physical Exam   Constitutional:   Chr ill elderly woman, NAD   HENT:   Head: Normocephalic and atraumatic.   Eyes: Conjunctivae and EOM are normal.  Pupils are equal, round, and reactive to light. No scleral icterus.   Neck: No JVD present. No tracheal deviation present. No thyromegaly present.   Cardiovascular: Normal rate and regular rhythm.  Exam reveals distant heart sounds. Exam reveals no gallop and no friction rub.    No murmur heard.  Pulmonary/Chest: Effort normal and breath sounds normal. No respiratory distress. She has no wheezes.   Abdominal: Soft. She exhibits no distension. There is no tenderness.   Decreased BS   Musculoskeletal: She exhibits no edema or tenderness.   Neurological: She is alert.   Facial droop   Skin: Skin is warm and dry.   Psychiatric: She has a normal mood and affect. Her behavior is normal.       Current Medications:   albuterol-ipratropium 2.5mg-0.5mg/3mL  3 mL Nebulization Q6H    aspirin  300 mg Rectal Daily    atorvastatin  40 mg Oral Daily    enoxaparin  30 mg Subcutaneous Daily      dextrose 5 % and 0.45 % NaCl 75 mL/hr at 06/12/17 1114     acetaminophen, hydrALAZINE, ondansetron    Laboratory:  CBC:    Recent Labs  Lab 06/11/17  0513 06/12/17  0422   WHITE BLOOD CELL COUNT 9.02 10.39   HEMOGLOBIN 10.9 L 9.6 L   HEMATOCRIT 34.5 L 30.1 L   PLATELETS 323 250       CHEMISTRIES:    Recent Labs  Lab 06/11/17  0513 06/12/17  0105   GLUCOSE 97 148 H  148 H   SODIUM 154 H 151 H  151 H   POTASSIUM 4.2 3.7  3.7   BUN BLD 48 H 50 H  50 H   CREATININE 4.0 H 3.9 H  3.9 H   EGFR IF  11 A 11 A  11 A   EGFR IF NON- 10 A 10 A  10 A   CALCIUM 9.6 8.9  8.9   MAGNESIUM 1.6  --        CARDIAC BIOMARKERS:    Recent Labs  Lab 06/11/17  1235 06/11/17  1538 06/12/17  0105   TROPONIN I 0.052 H 0.056 H 0.046 H       COAGS:        LIPIDS/LFTS:    Recent Labs  Lab 06/11/17  0513 06/11/17  1235 06/12/17  0105   CHOLESTEROL  --  113 L  --    TRIGLYCERIDES  --  191 H  --    HDL  --  13 L  --    LDL CHOLESTEROL  --  61.8 L  --    NON-HDL CHOLESTEROL  --  100  --    AST 50 H  --  33  33   ALT 13  --  9 L  9  L           Diagnostic Results:  ECG (personally reviewed tracings):   6/11/17 0521 , inflat ST abnl ?isch  6/12/17 1345 SR 96, NSSTTW changes    Chest X-Ray (personally reviewed image(s)): 6/11/17 NAD    Echo: pending

## 2017-06-12 NOTE — PLAN OF CARE
Problem: Fall Risk (Adult)  Goal: Identify Related Risk Factors and Signs and Symptoms  Related risk factors and signs and symptoms are identified upon initiation of Human Response Clinical Practice Guideline (CPG)   Outcome: Ongoing (interventions implemented as appropriate)   06/12/17 0006   Fall Risk   Related Risk Factors (Fall Risk) age-related changes;culprit medication(s);polypharmacy   Signs and Symptoms (Fall Risk) presence of risk factors     Goal: Absence of Falls  Patient will demonstrate the desired outcomes by discharge/transition of care.   Outcome: Ongoing (interventions implemented as appropriate)   06/12/17 0006   Fall Risk (Adult)   Absence of Falls making progress toward outcome       Problem: Patient Care Overview  Goal: Plan of Care Review  Outcome: Ongoing (interventions implemented as appropriate)   06/12/17 0006   Coping/Psychosocial   Plan Of Care Reviewed With patient;daughter       Problem: Infection, Risk/Actual (Adult)  Goal: Identify Related Risk Factors and Signs and Symptoms  Related risk factors and signs and symptoms are identified upon initiation of Human Response Clinical Practice Guideline (CPG)   Outcome: Ongoing (interventions implemented as appropriate)   06/12/17 0006   Infection, Risk/Actual   Related Risk Factors (Infection, Risk/Actual) age extremes;medication effects   Signs and Symptoms (Infection, Risk/Actual) weakness     Goal: Infection Prevention/Resolution  Patient will demonstrate the desired outcomes by discharge/transition of care.   Outcome: Ongoing (interventions implemented as appropriate)   06/12/17 0006   Infection, Risk/Actual (Adult)   Infection Prevention/Resolution making progress toward outcome       Problem: Pressure Ulcer Risk (Patel Scale) (Adult,Obstetrics,Pediatric)  Goal: Identify Related Risk Factors and Signs and Symptoms  Related risk factors and signs and symptoms are identified upon initiation of Human Response Clinical Practice Guideline  (CPG)   Outcome: Ongoing (interventions implemented as appropriate)   06/12/17 0006   Pressure Ulcer Risk (Patel Scale)   Related Risk Factors (Pressure Ulcer Risk (Patel Scale)) age extremes;cognitive impairment;mobility impaired     Goal: Skin Integrity  Patient will demonstrate the desired outcomes by discharge/transition of care.   Outcome: Ongoing (interventions implemented as appropriate)   06/12/17 0006   Pressure Ulcer Risk (Patel Scale) (Adult,Obstetrics,Pediatric)   Skin Integrity making progress toward outcome       Problem: Bowel Obstruction (Adult)  Goal: Signs and Symptoms of Listed Potential Problems Will be Absent, Minimized or Managed (Bowel Obstruction)  Signs and symptoms of listed potential problems will be absent, minimized or managed by discharge/transition of care (reference Bowel Obstruction (Adult) CPG).   Outcome: Ongoing (interventions implemented as appropriate)   06/12/17 0006   Bowel Obstruction   Problems Assessed (Bowel Obstruction) all   Problems Present (Bowel Obstruction) electrolyte/acid-base imbalance

## 2017-06-12 NOTE — PLAN OF CARE
Problem: Patient Care Overview  Goal: Plan of Care Review  06/12/2017    Recommendations    Recommendation/Intervention: 1) Advance patient to Low Sodium, 2 gm diet, Renal (texture per SLP) 2) Boost Plus vanilla TID 3) If not medically feasible to advance diet, recommend ppn:  Clinimix 4.25/10 @ 80 cc/hr with 250 mL 20% IV Lipids daily, provides 1478 calories, 81.6 g protein, 1920 cc IV Fluid (GIR = 2.1). Monitor Triglycerides weekly. Patient at risk for refeeding syndrome. Monitor lytes - replete as needed.    Goals: 1) Patient will tolerate oral diet or nutrition support  Nutrition Goal Status: new  Communication of RD Recs: reviewed with LUIS F Guerin, MPH, RD, LDN

## 2017-06-13 PROBLEM — R00.0 TACHYCARDIA: Status: RESOLVED | Noted: 2017-01-01 | Resolved: 2017-01-01

## 2017-06-13 PROBLEM — E87.0 HYPERNATREMIA: Status: RESOLVED | Noted: 2017-01-01 | Resolved: 2017-01-01

## 2017-06-13 PROBLEM — I35.0 NONRHEUMATIC AORTIC VALVE STENOSIS: Status: ACTIVE | Noted: 2017-01-01

## 2017-06-13 PROBLEM — I42.9 CARDIOMYOPATHY: Status: ACTIVE | Noted: 2017-01-01

## 2017-06-13 NOTE — PT/OT/SLP PROGRESS
Physical Therapy      Seema Schreiber  MRN: [id number]    Patient not seen today secondary to other (transport present to take patient to ultrasound). Will follow-up again tomorrow.    Abigail Melendez, PT, MOT

## 2017-06-13 NOTE — ASSESSMENT & PLAN NOTE
LVEF of 25% and severe aortic stenosis. Medical management at this time. No chest pain or shortness of breath. Cannot give statin as she is strictly NPO. ON ASA. Will add carvedilol when able to take PO. Cannot start ACE-I or ARB due to renal dysfunction. Considering ischemic eval as outpatient. BP at goal. Decrease rate of IVFs. Appreciate further cards recs

## 2017-06-13 NOTE — PT/OT/SLP PROGRESS
Occupational Therapy      Seema Schreiber  MRN: [id number]    Patient not seen today secondary to Unavailable (Comment) (off the unit for testing). Will follow-up tomorrow.    Casandra Patterson OT  6/13/2017

## 2017-06-13 NOTE — SUBJECTIVE & OBJECTIVE
Past Medical History:   Diagnosis Date    CHF (congestive heart failure)     COPD (chronic obstructive pulmonary disease)     MI, acute, non ST segment elevation        No past surgical history on file.    Review of patient's allergies indicates:   Allergen Reactions    Codeine     Penicillins        No current facility-administered medications on file prior to encounter.      No current outpatient prescriptions on file prior to encounter.     Family History     None        Social History Main Topics    Smoking status: Unknown If Ever Smoked    Smokeless tobacco: Not on file    Alcohol use Not on file    Drug use: Unknown    Sexual activity: Not on file     Review of Systems   Unable to perform ROS: other   ?senile dementia    Objective:     Vital Signs (Most Recent):  Temp: 99.8 °F (37.7 °C) (06/13/17 0400)  Pulse: 70 (06/13/17 0733)  Resp: 20 (06/13/17 0733)  BP: (!) 114/98 (06/13/17 0400)  SpO2: 96 % (06/13/17 0733) Vital Signs (24h Range):  Temp:  [98.1 °F (36.7 °C)-101.9 °F (38.8 °C)] 99.8 °F (37.7 °C)  Pulse:  [] 70  Resp:  [18-28] 20  SpO2:  [96 %-98 %] 96 %  BP: ()/(52-98) 114/98     Weight: 63.5 kg (140 lb)  Body mass index is 24.03 kg/m².    SpO2: 96 %  O2 Device (Oxygen Therapy): nasal cannula      Intake/Output Summary (Last 24 hours) at 06/13/17 0823  Last data filed at 06/13/17 0600   Gross per 24 hour   Intake             1575 ml   Output              350 ml   Net             1225 ml       Lines/Drains/Airways     Peripherally Inserted Central Catheter Line                 PICC Triple Lumen 06/11/17 0456  2 days          Drain                 NG/OG Tube 06/11/17 0737 nasogastric 16 Fr. Right nostril 2 days         Urethral Catheter 06/11/17 2 days                Physical Exam   Constitutional:   Chr ill elderly woman, NAD   HENT:   Head: Normocephalic and atraumatic.   Eyes: Conjunctivae and EOM are normal. Pupils are equal, round, and reactive to light. No scleral icterus.    Neck: No JVD present. No tracheal deviation present. No thyromegaly present.   Cardiovascular: Normal rate and regular rhythm.  Exam reveals distant heart sounds. Exam reveals no gallop and no friction rub.    No murmur heard.  Pulmonary/Chest: Effort normal and breath sounds normal. No respiratory distress. She has no wheezes.   Abdominal: Soft. She exhibits no distension. There is no tenderness.   Decreased BS   Musculoskeletal: She exhibits no edema or tenderness.   Neurological: She is alert.   Facial droop   Skin: Skin is warm and dry.   Psychiatric: She has a normal mood and affect. Her behavior is normal.       Current Medications:   albuterol-ipratropium 2.5mg-0.5mg/3mL  3 mL Nebulization Q6H    aspirin  300 mg Rectal Daily    atorvastatin  40 mg Oral Daily    enoxaparin  30 mg Subcutaneous Daily    magnesium sulfate IVPB  2 g Intravenous Once    metronidazole  500 mg Intravenous Q8H    potassium chloride  10 mEq Intravenous Once      custom IV infusion builder       acetaminophen, hydrALAZINE, ondansetron    Laboratory:  CBC:    Recent Labs  Lab 06/11/17  0513 06/12/17  0422   WHITE BLOOD CELL COUNT 9.02 10.39   HEMOGLOBIN 10.9 L 9.6 L   HEMATOCRIT 34.5 L 30.1 L   PLATELETS 323 250       CHEMISTRIES:    Recent Labs  Lab 06/11/17  0513 06/12/17  0105 06/13/17  0452   GLUCOSE 97 148 H  148 H 98   SODIUM 154 H 151 H  151 H 143   POTASSIUM 4.2 3.7  3.7 2.9 L   BUN BLD 48 H 50 H  50 H 44 H   CREATININE 4.0 H 3.9 H  3.9 H 2.5 H   EGFR IF  11 A 11 A  11 A 19 A   EGFR IF NON- 10 A 10 A  10 A 17 A   CALCIUM 9.6 8.9  8.9 7.8 L   MAGNESIUM 1.6  --  1.0 L       CARDIAC BIOMARKERS:    Recent Labs  Lab 06/11/17  1235 06/11/17  1538 06/12/17  0105   TROPONIN I 0.052 H 0.056 H 0.046 H       COAGS:        LIPIDS/LFTS:    Recent Labs  Lab 06/11/17  0513 06/11/17  1235 06/12/17  0105   CHOLESTEROL  --  113 L  --    TRIGLYCERIDES  --  191 H  --    HDL  --  13 L  --    LDL  CHOLESTEROL  --  61.8 L  --    NON-HDL CHOLESTEROL  --  100  --    AST 50 H  --  33  33   ALT 13  --  9 L  9 L           Diagnostic Results:  ECG (personally reviewed tracings):   6/11/17 0521 , inflat ST abnl ?isch  6/12/17 1345 SR 96, NSSTTW changes    Chest X-Ray (personally reviewed image(s)): 6/11/17 NAD    Echo: 6/12/17 (images pers rev)    1 - Severely depressed left ventricular systolic function (EF 25-30%).     2 - Severe global hypokinesis.     3 - Severe aortic stenosis, GENE = 0.79 cm2, peak velocity = 4.85 m/s, mean gradient = 52 mmHg.     4 - Mild mitral regurgitation.     5 - Trivial tricuspid regurgitation.     6 - The estimated PA systolic pressure is greater than 33 mmHg.

## 2017-06-13 NOTE — ASSESSMENT & PLAN NOTE
Acute according to family per note; rash on trunk and thighs  Neurology following  MRI- negative. On ASA. Holding off statin given strict NPO status  Alert and oriented  RPR neg, sed rate and crp high.

## 2017-06-13 NOTE — PROGRESS NOTES
Renal Progress Note      Date of Admission: 6/11/2017  4:46 AM      ROS:  Unable to obtain due to clinical condition      Vitals:    06/13/17 0900   BP:    Pulse: 104   Resp:    Temp:      I/O last 3 completed shifts:  In: 1700 [I.V.:1600; IV Piggyback:100]  Out: 600 [Urine:550; Drains:50]  No intake/output data recorded.      Constitutional: Elderly female, thin, NAD  HENT: NGT in place  Head: Normocephalic.   Cardiovascular: Normal rate.  S1-S2  Pulmonary: CTA  Abdominal: Soft. She exhibits no distension, there is no tenderness Hypoactive BS   Neurologic: confused    Laboratories:    No results for input(s): WBC, RBC, HGB, HCT, PLT, MCV, MCH, MCHC in the last 24 hours.    Recent Labs  Lab 06/13/17  0452   CALCIUM 7.8*      K 2.9*   CO2 21*   *   BUN 44*   CREATININE 2.5*     Iron  45   Iron     TIBC   172  TIBC    Saturated Iron   26  Saturated Iron    Transferrin   116  116  Transferrin    Ferritin   182  Ferritin    Folate   17.9  Folate    Vitamin B-12   888  Vitamin      Phosphorus  5.4   Phosphorus     Magnesium   1.6  Magnesium     Microbiology Results (last 7 days)     Procedure Component Value Units Date/Time    Urine culture [[id number]] Collected:  06/11/17 1133    Order Status:  Completed Specimen:  Urine from Urine, Catheterized Updated:  06/13/17 0747     Urine Culture, Routine No growth    Blood culture [[id number]] Collected:  06/12/17 1852    Order Status:  Completed Specimen:  Blood Updated:  06/13/17 0312     Blood Culture, Routine No Growth to date          Assessment:    85 y/o AAF admitted with:    Small bowel obstruction.  Acute kidney injury (unknown baseline creatinine)  Hypernatremia secondary to free water deficit.  HypoK+  HypoMg++  Metabolic acidosis - improving -  Anemia of chronic disease  Hypoalbuminemia  Hx. CVA.  Hx. COPD.    Plan:    - D/C NGT today as per Surgery  - Clear liquids later? Will defer Surgery  - Cheung to gravity x 1 more  day  - replace Mg++ and re-check  - IVFs to provide free H2O, HCO3- and K+  - f/u BMP    .

## 2017-06-13 NOTE — ASSESSMENT & PLAN NOTE
No overt signs of bleeding / Anemia studies liekly 2/2 renal disease  Anemia studies. B12 and folic acid WNL. Iron in low normal range. Will re-check CBC in AM as I believe lower level is 2/2 to dilution for IVFs

## 2017-06-13 NOTE — PROGRESS NOTES
"[facility] [place]  Progress Note    Patient Name: Seema Schreiber  MRN: [id number]  Patient Class: IP- Inpatient   Admission Date: 6/11/2017  Length of Stay: 2 days  Attending Physician: Eri Alfred MD  Primary Care Provider: Dajuan Guthrie MD        Subjective:     Principal Problem:Small bowel obstruction    HPI:  Seema Schreiber is a 86 y.o. AAF with history of MI, COPD? And CHF? who presented to [facility] from [facility]. According to documentation she was transferred for evaluation of confirmed stroke and small bowel obstruction as seen on CT at [facility]. Per EMS, pt had L-sided facial drooping and altered mental status.     Daughters initially brought pt to [place] for increased confusion (x1 day), decreased appetite (x4 days), and weakness. Pt is normally able to ambulate by herself but has been unable to these past few days. Daughters deny emesis.   Patient is awake and alert at the time of interview, denies constipation or emesis states, "I keep harking like I want to throw up". She reports last BM as yesterday, says it was formed and normal. She denies any chronic pain issues or chronic use of narcotics. Of note patient has well distributed macular papular rash on BL thighs and trunk. States her PCP told her that she had the mumps about 1 month ago, she tells me, "they said they can't do nothing about it".    Patient's labs reveal hypernatremia, dehydration with acute on chronic renal failure III. Her physical assessment is negative for acute bowel but bowel sounds completely absent. She has NG tube in place and denies NV abdominal pain or diarrhea. There is slight L facial drooping with slight blinking eyelid delay.    Hospital Course:  The patient was admitted to the hospital for eval and treatment of a small bowel obstruction as well for evaluation of L facial droop. For an unknown reason, the patient was initially sent to the ICU but sent out the " same day. MRI of her head was negative for any acute change.  NG tube placed to suction.      Interval History: NG tube removed today. Patient overall feeling much better. Per Speech, severe dysphagia persists however. Renal function improving with fluids. Still has almendarez in place.     Review of Systems   Constitutional: Negative for activity change.   Respiratory: Negative for chest tightness and shortness of breath.    Cardiovascular: Negative for chest pain.   Gastrointestinal: Negative for abdominal pain, diarrhea and vomiting.   Genitourinary: Negative.    Musculoskeletal: Negative.    Neurological: Negative.    Psychiatric/Behavioral: Negative.      Objective:     Vital Signs (Most Recent):  Temp: 99.2 °F (37.3 °C) (06/13/17 1600)  Pulse: 104 (06/13/17 1600)  Resp: (!) 24 (06/13/17 1600)  BP: (!) 99/49 (06/13/17 1600)  SpO2: 99 % (06/13/17 1600) Vital Signs (24h Range):  Temp:  [99.2 °F (37.3 °C)-101.6 °F (38.7 °C)] 99.2 °F (37.3 °C)  Pulse:  [] 104  Resp:  [18-32] 24  SpO2:  [96 %-99 %] 99 %  BP: ()/(49-98) 99/49     Weight: 63.5 kg (140 lb)  Body mass index is 24.03 kg/m².    Intake/Output Summary (Last 24 hours) at 06/13/17 1835  Last data filed at 06/13/17 0600   Gross per 24 hour   Intake             1575 ml   Output              350 ml   Net             1225 ml      Physical Exam   Constitutional: She appears well-developed and well-nourished.   HENT:   Head: Normocephalic.   NG removed   Eyes: Conjunctivae and EOM are normal.   Cardiovascular: Normal rate.    Pulmonary/Chest: Effort normal.   Abdominal: Soft. She exhibits no distension. There is no tenderness. There is no guarding.   Very Hypoactive BS   Musculoskeletal: Normal range of motion. She exhibits no edema.   Neurological: She is alert.   Skin: Skin is warm and dry.   Psychiatric: She has a normal mood and affect. Her behavior is normal. Judgment and thought content normal.   Vitals reviewed.      Significant Labs:   BMP:      Recent Labs  Lab 06/13/17  0452   GLU 98      K 2.9*   *   CO2 21*   BUN 44*   CREATININE 2.5*   CALCIUM 7.8*   MG 1.0*     CBC:     Recent Labs  Lab 06/12/17  0422   WBC 10.39   HGB 9.6*   HCT 30.1*          Magnesium:    Recent Labs  Lab 06/13/17  0452   MG 1.0*       Significant Imaging:  Abdominal x-ray reviewed.     Assessment/Plan:      * Small bowel obstruction    NG tube removed today as obstruction had improved. However, patient is to remain NPO due to severe dysphagia. Will continue current IVFs for now and f/u on Speech and surgery evaluation in AM. BS still hypoactive.             Chronic combined systolic and diastolic heart failure    LVEF of 25% and severe aortic stenosis. Medical management at this time. No chest pain or shortness of breath. Cannot give statin as she is strictly NPO. ON ASA. Will add carvedilol when able to take PO. Cannot start ACE-I or ARB due to renal dysfunction. Considering ischemic eval as outpatient. BP at goal. Decrease rate of IVFs. Appreciate further cards recs          Acute renal failure    Continue IVFs and strict I/Os. Is on ASA rectally for cerebrovascular disease. No acute stroke. Renal function improving. Appreciate further nephro recs. Remove almendarez?          Nonrheumatic aortic valve stenosis    severe          Weakness    Lives with daughter. Walks with rolling walker. PT/OT consulted. May need SNF in the end.           Severe malnutrition    F/u Speech recs. Start TPN??          Facial droop    Acute according to family per note; rash on trunk and thighs  Neurology following  MRI- negative. On ASA. Holding off statin given strict NPO status  Alert and oriented  RPR neg, sed rate and crp high.         Anemia, chronic disease    No overt signs of bleeding / Anemia studies liekly 2/2 renal disease  Anemia studies. B12 and folic acid WNL. Iron in low normal range. Will re-check CBC in AM as I believe lower level is 2/2 to dilution for IVFs         Elevated troponin I level    Likely 2/2 renal - denies chest pain            VTE Risk Mitigation         Ordered     enoxaparin injection 30 mg  Daily     Route:  Subcutaneous        06/11/17 1055     Medium Risk of VTE  Once      06/11/17 0856     Place sequential compression device  Until discontinued      06/11/17 0856     Place ISHMAEL hose  Until discontinued      06/11/17 0856        NG tube removed today. F/U speech to see when and if diet can be started. TPN? Decrease rate of IVFs given severely depressed LVEF. Appreciate consulting services' input.     Eri Ortiz MD  Department of Hospital Medicine   [facility]

## 2017-06-13 NOTE — PLAN OF CARE
Problem: SLP Goal  Goal: SLP Goal  Short term goals:  1) assess po intake when able  2) oral motor exercises   Outcome: Ongoing (interventions implemented as appropriate)  Patient seen in room daughter and family member present, spoke to nsg before to verify ok to offer po trials.  Patient easy to wake and responds to name.  repositioned for safe po intake.  Patient is edentulous.  Minimal oral cavity opening and limited lingual movement.   Presented small 1/2 teaspoon of pudding by spoon, patient had difficulty with retrieving bolus form spoon, max deficits with bolus formation and bolus transport.  Patient attempted to initiate a swallow, however, pudding remained stuck to top of mouth.  Patient  Observed with lingual pumping in attempted to remove bolus, required some manual tactile cues to assist with swallow of bolus.  Delayed swallow with some premature leakage prior to swallow.  Notable congestion and coughing after swallow with obvious s/s of aspiration after swallow.  No further consistencies were tested and recommend to remain NPO at this time and to have on going reassessment for determination of po diet if possible.

## 2017-06-13 NOTE — ASSESSMENT & PLAN NOTE
NG tube removed today as obstruction had improved. However, patient is to remain NPO due to severe dysphagia. Will continue current IVFs for now and f/u on Speech and surgery evaluation in AM. BS still hypoactive.

## 2017-06-13 NOTE — PROGRESS NOTES
[facility]  Cardiology  Progress Note    Patient Name: Seema Schreiber  MRN: [id number]  Admission Date: 6/11/2017  Hospital Length of Stay: 2 days  Code Status: Full Code   Attending Physician: Eri Alfred MD   Primary Care Physician: Dajuan Guthrie MD  Expected Discharge Date:   Principal Problem:Small bowel obstruction    Subjective:     Interval Hx:  Pt denies CP/SOB.  Apparently had BMx2 overnight, NGT clamped.    Tele: SR/Bob, PACs. (pers rev)    Past Medical History:   Diagnosis Date    CHF (congestive heart failure)     COPD (chronic obstructive pulmonary disease)     MI, acute, non ST segment elevation        No past surgical history on file.    Review of patient's allergies indicates:   Allergen Reactions    Codeine     Penicillins        No current facility-administered medications on file prior to encounter.      No current outpatient prescriptions on file prior to encounter.     Family History     None        Social History Main Topics    Smoking status: Unknown If Ever Smoked    Smokeless tobacco: Not on file    Alcohol use Not on file    Drug use: Unknown    Sexual activity: Not on file     Review of Systems   Unable to perform ROS: other   ?senile dementia    Objective:     Vital Signs (Most Recent):  Temp: 99.8 °F (37.7 °C) (06/13/17 0400)  Pulse: 70 (06/13/17 0733)  Resp: 20 (06/13/17 0733)  BP: (!) 114/98 (06/13/17 0400)  SpO2: 96 % (06/13/17 0733) Vital Signs (24h Range):  Temp:  [98.1 °F (36.7 °C)-101.9 °F (38.8 °C)] 99.8 °F (37.7 °C)  Pulse:  [] 70  Resp:  [18-28] 20  SpO2:  [96 %-98 %] 96 %  BP: ()/(52-98) 114/98     Weight: 63.5 kg (140 lb)  Body mass index is 24.03 kg/m².    SpO2: 96 %  O2 Device (Oxygen Therapy): nasal cannula      Intake/Output Summary (Last 24 hours) at 06/13/17 0823  Last data filed at 06/13/17 0600   Gross per 24 hour   Intake             1575 ml   Output              350 ml   Net             1225 ml        Lines/Drains/Airways     Peripherally Inserted Central Catheter Line                 PICC Triple Lumen 06/11/17 0456  2 days          Drain                 NG/OG Tube 06/11/17 0737 nasogastric 16 Fr. Right nostril 2 days         Urethral Catheter 06/11/17 2 days                Physical Exam   Constitutional:   Chr ill elderly woman, NAD   HENT:   Head: Normocephalic and atraumatic.   Eyes: Conjunctivae and EOM are normal. Pupils are equal, round, and reactive to light. No scleral icterus.   Neck: No JVD present. No tracheal deviation present. No thyromegaly present.   Cardiovascular: Normal rate and regular rhythm.  Exam reveals distant heart sounds. Exam reveals no gallop and no friction rub.    No murmur heard.  Pulmonary/Chest: Effort normal and breath sounds normal. No respiratory distress. She has no wheezes.   Abdominal: Soft. She exhibits no distension. There is no tenderness.   Decreased BS   Musculoskeletal: She exhibits no edema or tenderness.   Neurological: She is alert.   Facial droop   Skin: Skin is warm and dry.   Psychiatric: She has a normal mood and affect. Her behavior is normal.       Current Medications:   albuterol-ipratropium 2.5mg-0.5mg/3mL  3 mL Nebulization Q6H    aspirin  300 mg Rectal Daily    atorvastatin  40 mg Oral Daily    enoxaparin  30 mg Subcutaneous Daily    magnesium sulfate IVPB  2 g Intravenous Once    metronidazole  500 mg Intravenous Q8H    potassium chloride  10 mEq Intravenous Once      custom IV infusion builder       acetaminophen, hydrALAZINE, ondansetron    Laboratory:  CBC:    Recent Labs  Lab 06/11/17  0513 06/12/17  0422   WHITE BLOOD CELL COUNT 9.02 10.39   HEMOGLOBIN 10.9 L 9.6 L   HEMATOCRIT 34.5 L 30.1 L   PLATELETS 323 250       CHEMISTRIES:    Recent Labs  Lab 06/11/17  0513 06/12/17  0105 06/13/17  0452   GLUCOSE 97 148 H  148 H 98   SODIUM 154 H 151 H  151 H 143   POTASSIUM 4.2 3.7  3.7 2.9 L   BUN BLD 48 H 50 H  50 H 44 H   CREATININE 4.0 H  3.9 H  3.9 H 2.5 H   EGFR IF  11 A 11 A  11 A 19 A   EGFR IF NON- 10 A 10 A  10 A 17 A   CALCIUM 9.6 8.9  8.9 7.8 L   MAGNESIUM 1.6  --  1.0 L       CARDIAC BIOMARKERS:    Recent Labs  Lab 06/11/17  1235 06/11/17  1538 06/12/17  0105   TROPONIN I 0.052 H 0.056 H 0.046 H       COAGS:        LIPIDS/LFTS:    Recent Labs  Lab 06/11/17  0513 06/11/17  1235 06/12/17  0105   CHOLESTEROL  --  113 L  --    TRIGLYCERIDES  --  191 H  --    HDL  --  13 L  --    LDL CHOLESTEROL  --  61.8 L  --    NON-HDL CHOLESTEROL  --  100  --    AST 50 H  --  33  33   ALT 13  --  9 L  9 L           Diagnostic Results:  ECG (personally reviewed tracings):   6/11/17 0521 , inflat ST abnl ?isch  6/12/17 1345 SR 96, NSSTTW changes    Chest X-Ray (personally reviewed image(s)): 6/11/17 NAD    Echo: 6/12/17 (images pers rev)    1 - Severely depressed left ventricular systolic function (EF 25-30%).     2 - Severe global hypokinesis.     3 - Severe aortic stenosis, GENE = 0.79 cm2, peak velocity = 4.85 m/s, mean gradient = 52 mmHg.     4 - Mild mitral regurgitation.     5 - Trivial tricuspid regurgitation.     6 - The estimated PA systolic pressure is greater than 33 mmHg.       Assessment and Plan:     Nonrheumatic aortic valve stenosis    Severe  No plan for valve intervention at this juncture given absence of referable sxs.        Cardiomyopathy    Unclear etiology  Will consider ischemia eval as outpatient (see above)  Add coreg 3.125mg bid when pt able to take po  Consider ACEi pending improvement in pt's renal fxn        Elevated troponin I level    Asymptomatic  Doubt ACS  Likely d/t renal failure  No plan for inpatient isch eval at this juncture, can consider as outpatient if pt deemed to be a revasc candidate (not clear she is considering dementia, age, CRI, etc.)        * Small bowel obstruction    Per IM/GI/surgery  Seems to be improving        Facial droop    Per IM/Neuro, ?CN VII palsy             VTE Risk Mitigation         Ordered     enoxaparin injection 30 mg  Daily     Route:  Subcutaneous        06/11/17 1055     Medium Risk of VTE  Once      06/11/17 0856     Place sequential compression device  Until discontinued      06/11/17 0856     Place ISHMAEL hose  Until discontinued      06/11/17 0856          Thang Turner MD  Cardiology  [facility]

## 2017-06-13 NOTE — ASSESSMENT & PLAN NOTE
Continue IVFs and strict I/Os. Is on ASA rectally for cerebrovascular disease. No acute stroke. Renal function improving. Appreciate further nephro recs. Remove almendarez?

## 2017-06-13 NOTE — SUBJECTIVE & OBJECTIVE
Interval History: NG tube removed today. Patient overall feeling much better. Per Speech, severe dysphagia persists however. Renal function improving with fluids. Still has almendarez in place.     Review of Systems   Constitutional: Negative for activity change.   Respiratory: Negative for chest tightness and shortness of breath.    Cardiovascular: Negative for chest pain.   Gastrointestinal: Negative for abdominal pain, diarrhea and vomiting.   Genitourinary: Negative.    Musculoskeletal: Negative.    Neurological: Negative.    Psychiatric/Behavioral: Negative.      Objective:     Vital Signs (Most Recent):  Temp: 99.2 °F (37.3 °C) (06/13/17 1600)  Pulse: 104 (06/13/17 1600)  Resp: (!) 24 (06/13/17 1600)  BP: (!) 99/49 (06/13/17 1600)  SpO2: 99 % (06/13/17 1600) Vital Signs (24h Range):  Temp:  [99.2 °F (37.3 °C)-101.6 °F (38.7 °C)] 99.2 °F (37.3 °C)  Pulse:  [] 104  Resp:  [18-32] 24  SpO2:  [96 %-99 %] 99 %  BP: ()/(49-98) 99/49     Weight: 63.5 kg (140 lb)  Body mass index is 24.03 kg/m².    Intake/Output Summary (Last 24 hours) at 06/13/17 1835  Last data filed at 06/13/17 0600   Gross per 24 hour   Intake             1575 ml   Output              350 ml   Net             1225 ml      Physical Exam   Constitutional: She appears well-developed and well-nourished.   HENT:   Head: Normocephalic.   NG removed   Eyes: Conjunctivae and EOM are normal.   Cardiovascular: Normal rate.    Pulmonary/Chest: Effort normal.   Abdominal: Soft. She exhibits no distension. There is no tenderness. There is no guarding.   Very Hypoactive BS   Musculoskeletal: Normal range of motion. She exhibits no edema.   Neurological: She is alert.   Skin: Skin is warm and dry.   Psychiatric: She has a normal mood and affect. Her behavior is normal. Judgment and thought content normal.   Vitals reviewed.      Significant Labs:   BMP:     Recent Labs  Lab 06/13/17  0452   GLU 98      K 2.9*   *   CO2 21*   BUN 44*    CREATININE 2.5*   CALCIUM 7.8*   MG 1.0*     CBC:     Recent Labs  Lab 06/12/17  0422   WBC 10.39   HGB 9.6*   HCT 30.1*          Magnesium:    Recent Labs  Lab 06/13/17  0452   MG 1.0*       Significant Imaging:  Abdominal x-ray reviewed.

## 2017-06-13 NOTE — ASSESSMENT & PLAN NOTE
Unclear etiology  Will consider ischemia eval as outpatient (see above)  Add coreg 3.125mg bid when pt able to take po  Consider ACEi pending improvement in pt's renal fxn

## 2017-06-13 NOTE — PT/OT/SLP PROGRESS
Speech Language Pathology  Treatment    Seema Schreiber   MRN: [id number]   Admitting Diagnosis: Small bowel obstruction    Diet recommendations: Solid Diet Level: NPO  Liquid Diet Level: NPO     SLP Treatment Date: 06/13/17  Speech Start Time: 1330     Speech Stop Time: 1350     Speech Total (min): 20 min       TREATMENT BILLABLE MINUTES:  Treatment Swallowing Dysfunction 20    Has the patient been evaluated by SLP for swallowing? : Yes  Keep patient NPO?: Yes   General Precautions: Standard, fall, aspiration, NPO  Current Respiratory Status: nasal cannula       Subjective:        Patient seen in room daughter and family member present, spoke to nsg before to verify ok to offer po trials.  Patient easy to wake and responds to name.  repositioned for safe po intake.  Patient is edentulous.  Minimal oral cavity opening and limited lingual movement.   Presented small 1/2 teaspoon of pudding by spoon, patient had difficulty with retrieving bolus form spoon, max deficits with bolus formation and bolus transport.  Patient attempted to initiate a swallow, however, pudding remained stuck to top of mouth.  Patient  Observed with lingual pumping in attempted to remove bolus, required some manual tactile cues to assist with swallow of bolus.  Delayed swallow with some premature leakage prior to swallow.  Notable congestion and coughing after swallow with obvious s/s of aspiration after swallow.  No further consistencies were tested and recommend to remain NPO at this time and to have on going reassessment for determination of po diet if possible.    Objective:   Patient found with: PICC line, NG tube, telemetry    FIM:           Assessment:  Seema Schreiber is a 86 y.o. female with a medical diagnosis of Small bowel obstruction and presents with sever dysphagia    Discharge recommendations: Discharge Facility/Level Of Care Needs:  (not determined at this time)     Goals:    SLP Goals        Problem: SLP Goal    Goal Priority  Disciplines Outcome   SLP Goal     SLP Ongoing (interventions implemented as appropriate)   Description:  Short term goals:  1) assess po intake when able  2) oral motor exercises                     Plan:   Patient to be seen Therapy Frequency: 3 x/week   Plan of Care expires: 06/16/17  Plan of Care reviewed with: patient, daughter, family  SLP Follow-up?: Yes  SLP - Next Visit Date: 06/15/17           Michelle Reynoso CCC-SLP  06/13/2017

## 2017-06-13 NOTE — PROGRESS NOTES
Progress Note    Admit Date: 6/11/2017   LOS: 2 days     SUBJECTIVE:       No abd pain.  BM x 2 overnight.  Nurse reports productive cough    OBJECTIVE:       Vital Signs Range (Last 24H):  Temp:  [98.1 °F (36.7 °C)-101.9 °F (38.8 °C)]   Pulse:  []   Resp:  [18-28]   BP: ()/(52-98)   SpO2:  [93 %-98 %]     I & O (Last 24H):  Intake/Output Summary (Last 24 hours) at 06/13/17 0704  Last data filed at 06/13/17 0100   Gross per 24 hour   Intake                0 ml   Output              350 ml   Net             -350 ml         Physical Exam:  NAD  Abdomen: soft, non-tender, non-distended.  NGT min output per nursing, output not documented    Laboratory:  Pending        ASSESSMENT/PLAN:     SBO  - clinically appears resolved  - check KUB  - clamp NGT this AM, if tolerates then likely dc NGT this afternoon

## 2017-06-13 NOTE — ASSESSMENT & PLAN NOTE
Asymptomatic  Doubt ACS  Likely d/t renal failure  No plan for inpatient isch eval at this juncture, can consider as outpatient if pt deemed to be a revasc candidate (not clear she is considering dementia, age, CRI, etc.)

## 2017-06-13 NOTE — NURSING
Dr Norris notified that we would like to D/C Skye as soon as he no longer needs for I/O. No new orders.

## 2017-06-14 PROBLEM — E03.9 ACQUIRED HYPOTHYROIDISM: Status: ACTIVE | Noted: 2017-01-01

## 2017-06-14 PROBLEM — R50.9 LOW GRADE FEVER: Status: ACTIVE | Noted: 2017-01-01

## 2017-06-14 PROBLEM — I50.22 CHRONIC SYSTOLIC HEART FAILURE: Status: ACTIVE | Noted: 2017-01-01

## 2017-06-14 PROBLEM — R13.12 OROPHARYNGEAL DYSPHAGIA: Status: ACTIVE | Noted: 2017-01-01

## 2017-06-14 NOTE — PLAN OF CARE
Problem: SLP Goal  Goal: SLP Goal  Short term goals:  1) assess po intake when able  2) oral motor exercises   Outcome: Ongoing (interventions implemented as appropriate)  SLP f/u at b/s this date for ongoing assessment of swallow function 2/2 Pt's NPO status.  Pt continues with severe oropharyngeal dysphagia c/b oral motor mvmt/ability to control and manipulate bolus as well as overt s/s aspiration on ice chip and puree trials. Pt with moderate oral residue with ice chip and puree trials, oral suction provided by SLP.  Pt remains very high risk of aspiration of all PO at this time 2/2 poor swallow function, oral residue, and open mouth breathing.    REC: NPO with strict aspiration precautions and routine oral care.  Consider alternate means nutrition/medication/hydration at this time. ST to follow for ongoing swallow assessment and dysphagia tx.

## 2017-06-14 NOTE — PROGRESS NOTES
Received bedside report from LUIS F Sevilla; pt in bed; awake and alert; family in room; no distress noted; no c/o pain or discomfort; bed in lowest position; call light and personal belongings within reach; will cont to monitor.

## 2017-06-14 NOTE — ASSESSMENT & PLAN NOTE
This is severe. Strictly NPO. I will wean off fluids once able to initiated TPN (ordered). Has central line in place. I will use that one for now. Will continue daily Speech therapy with hopes that this will improve, otherwise patient will need PEG.

## 2017-06-14 NOTE — ASSESSMENT & PLAN NOTE
Had one episode of 101F. No leukocytosis. No consolidation on XRay. Recent UCx and BCx negative.

## 2017-06-14 NOTE — ASSESSMENT & PLAN NOTE
This is severe. No acute stroke per MRI. Strictly NPO. Unfortunately NG tube has been removed once SBO appeared to have resolved, and patient does not want it back in.On TPN temporarily. I discussed patient's progress with Speech therapist. Patient showed no signs of improvement over the past 3 days. Discussed PEG placement. I discussed with patient and daughter (Enedelia Gonzalez), who are in agreement. GI consult placed. Appreciate recs.

## 2017-06-14 NOTE — SUBJECTIVE & OBJECTIVE
Interval History: sinus tach this AM, responding well to IV metoprolol. On low flow nasal cannula. Not complaining of shortness of breath although vitals suggest tachypnea. Severe dysphagia persists. Renal function improving. No leuk. No fever this AM.     Review of Systems   Constitutional: Negative for activity change.   Respiratory: Negative for chest tightness and shortness of breath.    Cardiovascular: Negative for chest pain.   Gastrointestinal: Negative for abdominal pain, diarrhea and vomiting.   Genitourinary: Negative.    Musculoskeletal: Negative.    Neurological: Negative.    Psychiatric/Behavioral: Negative.      Objective:     Vital Signs (Most Recent):  Temp: 98.2 °F (36.8 °C) (06/14/17 1300)  Pulse: 98 (06/14/17 1342)  Resp: (!) 21 (06/14/17 1342)  BP: (!) 110/58 (06/14/17 1300)  SpO2: 96 % (06/14/17 1342) Vital Signs (24h Range):  Temp:  [98.2 °F (36.8 °C)-100.5 °F (38.1 °C)] 98.2 °F (36.8 °C)  Pulse:  [] 98  Resp:  [18-36] 21  SpO2:  [91 %-98 %] 96 %  BP: ()/(50-64) 110/58     Weight: 71.2 kg (157 lb)  Body mass index is 26.95 kg/m².    Intake/Output Summary (Last 24 hours) at 06/14/17 1644  Last data filed at 06/14/17 0534   Gross per 24 hour   Intake             1100 ml   Output              650 ml   Net              450 ml      Physical Exam   Constitutional: She appears well-developed and well-nourished.   HENT:   Head: Normocephalic.   NG removed   Eyes: Conjunctivae and EOM are normal.   Cardiovascular: Normal rate.    Pulmonary/Chest: Effort normal and breath sounds normal. No respiratory distress. She has no wheezes. She has no rales.   On low flow NC   Abdominal: Soft. She exhibits no distension. There is no tenderness. There is no guarding.   Very Hypoactive BS   Musculoskeletal: Normal range of motion. She exhibits no edema.   Neurological: She is alert.   Skin: Skin is warm and dry.   Central line in place   Psychiatric: She has a normal mood and affect. Her behavior is  normal. Judgment and thought content normal.   Vitals reviewed.      Significant Labs: All pertinent labs within the past 24 hours have been reviewed.    Magnesium:    Recent Labs  Lab 06/13/17  0452 06/14/17  0519   MG 1.0* 1.4*       Significant Imaging: I have reviewed all pertinent imaging results/findings within the past 24 hours.  I have reviewed and interpreted all pertinent imaging results/findings within the past 24 hours.

## 2017-06-14 NOTE — PLAN OF CARE
Problem: Bowel Obstruction (Adult)  Goal: Signs and Symptoms of Listed Potential Problems Will be Absent, Minimized or Managed (Bowel Obstruction)  Signs and symptoms of listed potential problems will be absent, minimized or managed by discharge/transition of care (reference Bowel Obstruction (Adult) CPG).   Outcome: Ongoing (interventions implemented as appropriate)  NGT removed at 1500. Patient has no complaints or sxs of n/v pain or abd discomfort. Requesting juice. Speech to f/u with patient to reassess swallow and diet recommendations. Fluids and abx continued. Continue poc

## 2017-06-14 NOTE — PROGRESS NOTES
AM assessment complete. Sustained ST HR 130s. Dr. Ortiz notified. Order received. Monitored closely during IV Metropolol . Cardiology vsd. Pt. Remains NPO awaiting speech consult. Denies SOB. Resps 32-36 per minute. O2 sats good upper 90s on 2 L/M nc. Slurred speech. Oriented. Denies pain.

## 2017-06-14 NOTE — ASSESSMENT & PLAN NOTE
LVEF of 25% and severe aortic stenosis. Medical management at this time. No chest pain or shortness of breath. Cannot give statin as she is strictly NPO. ON ASA rectally. Will add carvedilol when able to take PO. Cannot start ACE-I or ARB due to renal dysfunction. Considering ischemic eval as outpatient. BP at goal. Decrease rate of IVFs, now planning to start TPN. Appreciate further cards recs

## 2017-06-14 NOTE — PROGRESS NOTES
[facility]  Cardiology  Progress Note    Patient Name: Seema Schreiber  MRN: [id number]  Admission Date: 6/11/2017  Hospital Length of Stay: 3 days  Code Status: Full Code   Attending Physician: Eri Alfred MD   Primary Care Physician: Dajuan Guthrie MD  Expected Discharge Date:   Principal Problem:Small bowel obstruction    Subjective:     Interval Hx:  Pt denies CP/SOB.  NGT removed.  Case d/w RN.  Awaiting clearance to give PO meds for CMP (i.e. BBl/ACEi)    Tele: SR/STachy, PACs, PVCs. (pers rev)    Past Medical History:   Diagnosis Date    CHF (congestive heart failure)     COPD (chronic obstructive pulmonary disease)     MI, acute, non ST segment elevation        No past surgical history on file.    Review of patient's allergies indicates:   Allergen Reactions    Codeine     Penicillins        No current facility-administered medications on file prior to encounter.      No current outpatient prescriptions on file prior to encounter.     Family History     None        Social History Main Topics    Smoking status: Unknown If Ever Smoked    Smokeless tobacco: Not on file    Alcohol use Not on file    Drug use: Unknown    Sexual activity: Not on file     Review of Systems   Unable to perform ROS: other   ?senile dementia    Objective:     Vital Signs (Most Recent):  Temp: 100.3 °F (37.9 °C) (06/14/17 0739)  Pulse: 92 (06/14/17 0739)  Resp: (!) 30 (06/14/17 0739)  BP: (!) 107/53 (06/14/17 0739)  SpO2: 96 % (06/14/17 0739) Vital Signs (24h Range):  Temp:  [98.6 °F (37 °C)-100.5 °F (38.1 °C)] 100.3 °F (37.9 °C)  Pulse:  [] 92  Resp:  [18-32] 30  SpO2:  [91 %-99 %] 96 %  BP: ()/(49-54) 107/53     Weight: 71.2 kg (157 lb)  Body mass index is 26.95 kg/m².    SpO2: 96 %  O2 Device (Oxygen Therapy): nasal cannula      Intake/Output Summary (Last 24 hours) at 06/14/17 0918  Last data filed at 06/14/17 0595   Gross per 24 hour   Intake             1100 ml   Output              650  ml   Net              450 ml       Lines/Drains/Airways     Peripherally Inserted Central Catheter Line                 PICC Triple Lumen 06/11/17 0456  3 days          Drain                 Urethral Catheter 06/11/17 3 days                Physical Exam   Constitutional:   Chr ill elderly woman, NAD   HENT:   Head: Normocephalic and atraumatic.   Eyes: Conjunctivae and EOM are normal. Pupils are equal, round, and reactive to light. No scleral icterus.   Neck: No JVD present. No tracheal deviation present. No thyromegaly present.   Cardiovascular: Normal rate and regular rhythm.  Exam reveals distant heart sounds. Exam reveals no gallop and no friction rub.    No murmur heard.  Pulmonary/Chest: Effort normal and breath sounds normal. No respiratory distress. She has no wheezes.   Abdominal: Soft. She exhibits no distension. There is no tenderness.   Decreased BS   Musculoskeletal: She exhibits no edema or tenderness.   Neurological: She is alert.   Facial droop   Skin: Skin is warm and dry.   Psychiatric: She has a normal mood and affect. Her behavior is normal.       Current Medications:   albuterol-ipratropium 2.5mg-0.5mg/3mL  3 mL Nebulization Q6H    aspirin  300 mg Rectal Daily    ciprofloxacin  400 mg Intravenous Q24H    enoxaparin  30 mg Subcutaneous Daily    levothyroxine  25 mcg Oral Before breakfast    magnesium sulfate IVPB  2 g Intravenous Once    metronidazole  500 mg Intravenous Q8H      custom IV infusion builder 100 mL/hr at 06/14/17 0321     acetaminophen, hydrALAZINE, metoprolol, ondansetron    Laboratory:  CBC:    Recent Labs  Lab 06/11/17  0513 06/12/17  0422   WHITE BLOOD CELL COUNT 9.02 10.39   HEMOGLOBIN 10.9 L 9.6 L   HEMATOCRIT 34.5 L 30.1 L   PLATELETS 323 250       CHEMISTRIES:    Recent Labs  Lab 06/11/17  0513 06/12/17  0105 06/13/17  0452 06/14/17  0519   GLUCOSE 97 148 H  148 H 98 93   SODIUM 154 H 151 H  151 H 143 145   POTASSIUM 4.2 3.7  3.7 2.9 L 3.5   BUN BLD 48 H 50 H   50 H 44 H 39 H   CREATININE 4.0 H 3.9 H  3.9 H 2.5 H 1.9 H   EGFR IF  11 A 11 A  11 A 19 A 27 A   EGFR IF NON- 10 A 10 A  10 A 17 A 24 A   CALCIUM 9.6 8.9  8.9 7.8 L 8.0 L   MAGNESIUM 1.6  --  1.0 L 1.4 L       CARDIAC BIOMARKERS:    Recent Labs  Lab 06/11/17  1235 06/11/17  1538 06/12/17  0105   TROPONIN I 0.052 H 0.056 H 0.046 H       COAGS:        LIPIDS/LFTS:    Recent Labs  Lab 06/11/17  0513 06/11/17  1235 06/12/17  0105   CHOLESTEROL  --  113 L  --    TRIGLYCERIDES  --  191 H  --    HDL  --  13 L  --    LDL CHOLESTEROL  --  61.8 L  --    NON-HDL CHOLESTEROL  --  100  --    AST 50 H  --  33  33   ALT 13  --  9 L  9 L           Diagnostic Results:  ECG (personally reviewed tracings):   6/11/17 0521 , inflat ST abnl ?isch  6/12/17 1345 SR 96, NSSTTW changes    Chest X-Ray (personally reviewed image(s)): 6/11/17 NAD    Echo: 6/12/17 (images pers rev)    1 - Severely depressed left ventricular systolic function (EF 25-30%).     2 - Severe global hypokinesis.     3 - Severe aortic stenosis, GENE = 0.79 cm2, peak velocity = 4.85 m/s, mean gradient = 52 mmHg.     4 - Mild mitral regurgitation.     5 - Trivial tricuspid regurgitation.     6 - The estimated PA systolic pressure is greater than 33 mmHg.       Assessment and Plan:     Elevated troponin I level    Asymptomatic  Doubt ACS  Likely d/t renal failure  No plan for inpatient isch eval at this juncture, can consider as outpatient if pt deemed to be a revasc candidate (not clear she is considering dementia, age, CRI, etc.)        Cardiomyopathy    Unclear etiology  Will consider ischemia eval as outpatient (see above)  Add coreg 3.125mg bid when pt able to take po  Consider ACEi pending improvement in pt's renal fxn        Nonrheumatic aortic valve stenosis    Severe  No plan for valve intervention at this juncture given absence of referable sxs.        Acute renal failure    Improving  Would like to start ACEi if creat  continues to normalize        * Small bowel obstruction    Per IM/GI/surgery  Seems to be improving  Awaiting swallow eval prior to starting PO meds        Facial droop    Per IM/Neuro, ?CN VII palsy            VTE Risk Mitigation         Ordered     enoxaparin injection 30 mg  Daily     Route:  Subcutaneous        06/11/17 1055     Medium Risk of VTE  Once      06/11/17 0856     Place sequential compression device  Until discontinued      06/11/17 0856     Place ISHMAEL hose  Until discontinued      06/11/17 0856          Thang Turner MD  Cardiology  [facility]

## 2017-06-14 NOTE — PLAN OF CARE
Problem: Physical Therapy Goal  Goal: Physical Therapy Goal  Goals to be met by: 17    Patient will increase functional independence with mobility by performin. Supine to sit with supervision  2. Sit to stand transfer with Supervision  3. Bed to chair transfer assess when able  4. Gait assess when able  5. Sitting at edge of bed x30 minutes with Supervision  6. Lower extremity exercise program x30 reps per handout, with supervision     Outcome: Ongoing (interventions implemented as appropriate)    Patient tolerated sitting on edge of bed with SBA. She would benefit from SNF at discharge. Full report to follow.

## 2017-06-14 NOTE — PROGRESS NOTES
"[facility] Medicine  Progress Note    Patient Name: Seema Schreiber  MRN: [id number]  Patient Class: IP- Inpatient   Admission Date: 6/11/2017  Length of Stay: 3 days  Attending Physician: Eri Alfred MD  Primary Care Provider: Dajuan Guthrie MD        Subjective:     Principal Problem:Small bowel obstruction    HPI:  Seema Schreiber is a 86 y.o. AAF with history of MI, COPD? And CHF? who presented to [facility] from [facility]. According to documentation she was transferred for evaluation of confirmed stroke and small bowel obstruction as seen on CT at [facility]. Per EMS, pt had L-sided facial drooping and altered mental status.     Daughters initially brought pt to [place] for increased confusion (x1 day), decreased appetite (x4 days), and weakness. Pt is normally able to ambulate by herself but has been unable to these past few days. Daughters deny emesis.   Patient is awake and alert at the time of interview, denies constipation or emesis states, "I keep harking like I want to throw up". She reports last BM as yesterday, says it was formed and normal. She denies any chronic pain issues or chronic use of narcotics. Of note patient has well distributed macular papular rash on BL thighs and trunk. States her PCP told her that she had the mumps about 1 month ago, she tells me, "they said they can't do nothing about it".    Patient's labs reveal hypernatremia, dehydration with acute on chronic renal failure III. Her physical assessment is negative for acute bowel but bowel sounds completely absent. She has NG tube in place and denies NV abdominal pain or diarrhea. There is slight L facial drooping with slight blinking eyelid delay.    Hospital Course:  The patient was admitted to the hospital for eval and treatment of a small bowel obstruction, left facial droop, hypovolemic hypernatremia and pre-renal SAVITA (Cr 4). For an unknown reason, the patient was initially " sent to the ICU but sent out the same day. MRI of her head was negative for any acute change. Per labs, patient also noted to be hypothyroid (TSH 7.4, free T4 0.8) and mild troponemia. 2D Echo revealed severe aortic valve stenosis and LVEF 25%. Per daughter, this is an old issue. Initiated on rectal ASA, howeve unable to start on BB or stating due to SBO at the time. Chest pain free. Troponemia likely demand and underlying renal dysfunction. Surgery, cardiology and nephrology consulted. NG tube placed to suction, empiric cipro/flagyl and initiated on IVFs. Small obstruction and hypernatremia eventually resolved, however severe dysphagia was noted to be an issue. SAVITA also improving with IVFs (gently infused given Syst HF) NG tube had already been removed by surgery. Is to be initiated on TPN.       Interval History: sinus tach this AM, responding well to IV metoprolol. On low flow nasal cannula. Not complaining of shortness of breath although vitals suggest tachypnea. Severe dysphagia persists. Renal function improving. No leuk. No fever this AM.     Review of Systems   Constitutional: Negative for activity change.   Respiratory: Negative for chest tightness and shortness of breath.    Cardiovascular: Negative for chest pain.   Gastrointestinal: Negative for abdominal pain, diarrhea and vomiting.   Genitourinary: Negative.    Musculoskeletal: Negative.    Neurological: Negative.    Psychiatric/Behavioral: Negative.      Objective:     Vital Signs (Most Recent):  Temp: 98.2 °F (36.8 °C) (06/14/17 1300)  Pulse: 98 (06/14/17 1342)  Resp: (!) 21 (06/14/17 1342)  BP: (!) 110/58 (06/14/17 1300)  SpO2: 96 % (06/14/17 1342) Vital Signs (24h Range):  Temp:  [98.2 °F (36.8 °C)-100.5 °F (38.1 °C)] 98.2 °F (36.8 °C)  Pulse:  [] 98  Resp:  [18-36] 21  SpO2:  [91 %-98 %] 96 %  BP: ()/(50-64) 110/58     Weight: 71.2 kg (157 lb)  Body mass index is 26.95 kg/m².    Intake/Output Summary (Last 24 hours) at 06/14/17  1644  Last data filed at 06/14/17 0534   Gross per 24 hour   Intake             1100 ml   Output              650 ml   Net              450 ml      Physical Exam   Constitutional: She appears well-developed and well-nourished.   HENT:   Head: Normocephalic.   NG removed   Eyes: Conjunctivae and EOM are normal.   Cardiovascular: Normal rate.    Pulmonary/Chest: Effort normal and breath sounds normal. No respiratory distress. She has no wheezes. She has no rales.   On low flow NC   Abdominal: Soft. She exhibits no distension. There is no tenderness. There is no guarding.   Very Hypoactive BS   Musculoskeletal: Normal range of motion. She exhibits no edema.   Neurological: She is alert.   Skin: Skin is warm and dry.   Central line in place   Psychiatric: She has a normal mood and affect. Her behavior is normal. Judgment and thought content normal.   Vitals reviewed.      Significant Labs: All pertinent labs within the past 24 hours have been reviewed.    Magnesium:    Recent Labs  Lab 06/13/17  0452 06/14/17  0519   MG 1.0* 1.4*       Significant Imaging: I have reviewed all pertinent imaging results/findings within the past 24 hours.  I have reviewed and interpreted all pertinent imaging results/findings within the past 24 hours.    Assessment/Plan:      * Small bowel obstruction    NG tube removed today as obstruction had improved. However, patient is to remain NPO due to severe dysphagia. Will continue current IVFs for now, to switch to TPN and continue daily Speech therapy with hopes that this will improve. Surgery following. Appreciate further recs            Oropharyngeal dysphagia    This is severe. Strictly NPO. Unfortunately NG tube has been removed once SBO appeared to have resolved. I will wean off fluids once able to initiated TPN (ordered). Using TPN instead of enteral nutrition via NG in case NG were to be affecting Speech eval. I will readdress replacing an NG after Speech's eval tomorrow. Has central  line in place. I will use that one for now. Will continue daily Speech therapy with hopes that this will improve, otherwise patient will need PEG.           Chronic systolic heart failure    LVEF of 25% and severe aortic stenosis. Medical management at this time. No chest pain or shortness of breath. Cannot give statin as she is strictly NPO. ON ASA rectally. Will add carvedilol when able to take PO. Cannot start ACE-I or ARB due to renal dysfunction. Considering ischemic eval as outpatient. BP at goal. Decrease rate of IVFs, now planning to start TPN. Appreciate further cards recs          Acute renal failure    Continue IVFs and strict I/Os. Is on ASA rectally for cerebrovascular disease. No acute stroke. Renal function improving. Appreciate further nephro recs. Cheung removed today          Low grade fever    Is afebrile today. Will stop cipro/flagyl. Will obtain CXR in case signs of asp pneumonia vs pulm edema, get an incentive spirometer and obtain CBC and BCx in AM. Patient has a central line in place          Acquired hypothyroidism    Will change levothyroxine to IV temporarily given dysphagia          Nonrheumatic aortic valve stenosis    Severe, per Echo. No acute issues          Cardiomyopathy              Weakness    Lives with daughter. Walks with rolling walker. PT/OT consulted. May need SNF in the end.           Severe malnutrition    F/u Speech recs. Start TPN per nutrition recs          Facial droop    Acute according to family per note; rash on trunk and thighs  Neurology following  MRI- negative. On ASA. Holding off statin given strict NPO status  Alert and oriented  RPR neg, sed rate and crp high.         Anemia, chronic disease    No overt signs of bleeding / Anemia studies liekly 2/2 renal disease  Anemia studies. B12 and folic acid WNL. Iron in low normal range. Will re-check CBC in AM as I believe lower level is 2/2 to dilution for IVFs        Elevated troponin I level    Likely 2/2 renal -  denies chest pain            VTE Risk Mitigation         Ordered     enoxaparin injection 30 mg  Daily     Route:  Subcutaneous        06/11/17 1055     Medium Risk of VTE  Once      06/11/17 0856     Place sequential compression device  Until discontinued      06/11/17 0856     Place ISHMAEL hose  Until discontinued      06/11/17 0856        Start TPN, CBC/CMP in AM, Speech re-eval tomorrow. May need PEG in the end. PT/OT. Plan discussed with patient and daughter.     Eri Ortiz MD  Department of Hospital Medicine   [facility]

## 2017-06-14 NOTE — SUBJECTIVE & OBJECTIVE
Past Medical History:   Diagnosis Date    CHF (congestive heart failure)     COPD (chronic obstructive pulmonary disease)     MI, acute, non ST segment elevation        No past surgical history on file.    Review of patient's allergies indicates:   Allergen Reactions    Codeine     Penicillins        No current facility-administered medications on file prior to encounter.      No current outpatient prescriptions on file prior to encounter.     Family History     None        Social History Main Topics    Smoking status: Unknown If Ever Smoked    Smokeless tobacco: Not on file    Alcohol use Not on file    Drug use: Unknown    Sexual activity: Not on file     Review of Systems   Unable to perform ROS: other   ?senile dementia    Objective:     Vital Signs (Most Recent):  Temp: 100.3 °F (37.9 °C) (06/14/17 0739)  Pulse: 92 (06/14/17 0739)  Resp: (!) 30 (06/14/17 0739)  BP: (!) 107/53 (06/14/17 0739)  SpO2: 96 % (06/14/17 0739) Vital Signs (24h Range):  Temp:  [98.6 °F (37 °C)-100.5 °F (38.1 °C)] 100.3 °F (37.9 °C)  Pulse:  [] 92  Resp:  [18-32] 30  SpO2:  [91 %-99 %] 96 %  BP: ()/(49-54) 107/53     Weight: 71.2 kg (157 lb)  Body mass index is 26.95 kg/m².    SpO2: 96 %  O2 Device (Oxygen Therapy): nasal cannula      Intake/Output Summary (Last 24 hours) at 06/14/17 0918  Last data filed at 06/14/17 0534   Gross per 24 hour   Intake             1100 ml   Output              650 ml   Net              450 ml       Lines/Drains/Airways     Peripherally Inserted Central Catheter Line                 PICC Triple Lumen 06/11/17 0456  3 days          Drain                 Urethral Catheter 06/11/17 3 days                Physical Exam   Constitutional:   Chr ill elderly woman, NAD   HENT:   Head: Normocephalic and atraumatic.   Eyes: Conjunctivae and EOM are normal. Pupils are equal, round, and reactive to light. No scleral icterus.   Neck: No JVD present. No tracheal deviation present. No thyromegaly  present.   Cardiovascular: Normal rate and regular rhythm.  Exam reveals distant heart sounds. Exam reveals no gallop and no friction rub.    No murmur heard.  Pulmonary/Chest: Effort normal and breath sounds normal. No respiratory distress. She has no wheezes.   Abdominal: Soft. She exhibits no distension. There is no tenderness.   Decreased BS   Musculoskeletal: She exhibits no edema or tenderness.   Neurological: She is alert.   Facial droop   Skin: Skin is warm and dry.   Psychiatric: She has a normal mood and affect. Her behavior is normal.       Current Medications:   albuterol-ipratropium 2.5mg-0.5mg/3mL  3 mL Nebulization Q6H    aspirin  300 mg Rectal Daily    ciprofloxacin  400 mg Intravenous Q24H    enoxaparin  30 mg Subcutaneous Daily    levothyroxine  25 mcg Oral Before breakfast    magnesium sulfate IVPB  2 g Intravenous Once    metronidazole  500 mg Intravenous Q8H      custom IV infusion builder 100 mL/hr at 06/14/17 0321     acetaminophen, hydrALAZINE, metoprolol, ondansetron    Laboratory:  CBC:    Recent Labs  Lab 06/11/17  0513 06/12/17  0422   WHITE BLOOD CELL COUNT 9.02 10.39   HEMOGLOBIN 10.9 L 9.6 L   HEMATOCRIT 34.5 L 30.1 L   PLATELETS 323 250       CHEMISTRIES:    Recent Labs  Lab 06/11/17  0513 06/12/17  0105 06/13/17  0452 06/14/17  0519   GLUCOSE 97 148 H  148 H 98 93   SODIUM 154 H 151 H  151 H 143 145   POTASSIUM 4.2 3.7  3.7 2.9 L 3.5   BUN BLD 48 H 50 H  50 H 44 H 39 H   CREATININE 4.0 H 3.9 H  3.9 H 2.5 H 1.9 H   EGFR IF  11 A 11 A  11 A 19 A 27 A   EGFR IF NON- 10 A 10 A  10 A 17 A 24 A   CALCIUM 9.6 8.9  8.9 7.8 L 8.0 L   MAGNESIUM 1.6  --  1.0 L 1.4 L       CARDIAC BIOMARKERS:    Recent Labs  Lab 06/11/17  1235 06/11/17  1538 06/12/17  0105   TROPONIN I 0.052 H 0.056 H 0.046 H       COAGS:        LIPIDS/LFTS:    Recent Labs  Lab 06/11/17  0513 06/11/17  1235 06/12/17  0105   CHOLESTEROL  --  113 L  --    TRIGLYCERIDES  --  191 H  --     HDL  --  13 L  --    LDL CHOLESTEROL  --  61.8 L  --    NON-HDL CHOLESTEROL  --  100  --    AST 50 H  --  33  33   ALT 13  --  9 L  9 L           Diagnostic Results:  ECG (personally reviewed tracings):   6/11/17 0521 , inflat ST abnl ?isch  6/12/17 1345 SR 96, NSSTTW changes    Chest X-Ray (personally reviewed image(s)): 6/11/17 NAD    Echo: 6/12/17 (images pers rev)    1 - Severely depressed left ventricular systolic function (EF 25-30%).     2 - Severe global hypokinesis.     3 - Severe aortic stenosis, GENE = 0.79 cm2, peak velocity = 4.85 m/s, mean gradient = 52 mmHg.     4 - Mild mitral regurgitation.     5 - Trivial tricuspid regurgitation.     6 - The estimated PA systolic pressure is greater than 33 mmHg.

## 2017-06-14 NOTE — PROGRESS NOTES
Received patient on 2 lpm NC with sats @ 96%. Patient RR is mid 20s to 30, yet she denies having any SOB. No apparent signs of resp distress noted. BS are diminished with an expiratory wheeze in the R lung. Looking back at her recent vitals, it appears that the patient always breaths fast. Will continue to monitor.

## 2017-06-14 NOTE — PROGRESS NOTES
Progress Note    Admit Date: 6/11/2017   LOS: 3 days     SUBJECTIVE:       Doing well.  No abd pain.  No n/v.  NGT out since 1500 yesterday    OBJECTIVE:       Vital Signs Range (Last 24H):  Temp:  [98.6 °F (37 °C)-100.8 °F (38.2 °C)]   Pulse:  []   Resp:  [18-32]   BP: ()/(49-55)   SpO2:  [91 %-99 %]     I & O (Last 24H):  Intake/Output Summary (Last 24 hours) at 06/14/17 0720  Last data filed at 06/14/17 0534   Gross per 24 hour   Intake             1100 ml   Output              650 ml   Net              450 ml         Physical Exam:  NAD  Abdomen: soft, non-tender, non-distended.    Laboratory:  Pending        ASSESSMENT/PLAN:     SBO  - clinically resolved  - noted dysphagia  - ok for diet from my standpoint but will defer to primary based on speech recs

## 2017-06-14 NOTE — ASSESSMENT & PLAN NOTE
Continue IVFs and strict I/Os. Is on ASA rectally for cerebrovascular disease. No acute stroke. Renal function improving. Appreciate further nephro recs. Cheung removed today

## 2017-06-14 NOTE — ASSESSMENT & PLAN NOTE
NG tube removed today as obstruction had improved. However, patient is to remain NPO due to severe dysphagia. Will continue current IVFs for now, to switch to TPN and continue daily Speech therapy with hopes that this will improve. Surgery following. Appreciate further recs

## 2017-06-14 NOTE — PROGRESS NOTES
Renal Progress Note      Date of Admission: 6/11/2017  4:46 AM      ROS:  Unable to obtain due to clinical condition      Vitals:    06/14/17 0855   BP: (!) 102/52   Pulse: 107   Resp:    Temp:      I/O last 3 completed shifts:  In: 2675 [I.V.:2375; IV Piggyback:300]  Out: 1000 [Urine:1000]  No intake/output data recorded.      Constitutional: Elderly female, thin, NAD  HENT: NGT in place  Head: Normocephalic.   Cardiovascular: Normal rate.  S1-S2  Pulmonary: CTA  Abdominal: Soft. She exhibits no distension, there is no tenderness Hypoactive BS   Neurologic: confused    Laboratories:    No results for input(s): WBC, RBC, HGB, HCT, PLT, MCV, MCH, MCHC in the last 24 hours.    Recent Labs  Lab 06/14/17  0519   CALCIUM 8.0*      K 3.5   CO2 22*   *   BUN 39*   CREATININE 1.9*     Magnesium           1.6  1.0 1.4  Magnesium       Iron  45   Iron     TIBC   172  TIBC    Saturated Iron   26  Saturated Iron    Transferrin   116  116  Transferrin    Ferritin   182  Ferritin    Folate   17.9  Folate    Vitamin B-12   888  Vitamin      Phosphorus  5.4   Phosphorus     Magnesium   1.6  Magnesium     Microbiology Results (last 7 days)     Procedure Component Value Units Date/Time    Blood culture [[id number]] Collected:  06/13/17 1819    Order Status:  Completed Specimen:  Blood Updated:  06/14/17 0312     Blood Culture, Routine No Growth to date    Blood culture [[id number]] Collected:  06/12/17 1852    Order Status:  Completed Specimen:  Blood Updated:  06/13/17 2103     Blood Culture, Routine No Growth to date     Blood Culture, Routine No Growth to date    Urine culture [[id number]] Collected:  06/11/17 1133    Order Status:  Completed Specimen:  Urine from Urine, Catheterized Updated:  06/13/17 0747     Urine Culture, Routine No growth          Assessment:    85 y/o AAF admitted with:    Small bowel obstruction.  Acute kidney injury (unknown baseline creatinine)  Hypernatremia  secondary to free water deficit.  HypoK+  HypoMg++  Metabolic acidosis - improving -  Anemia of chronic disease  Hypoalbuminemia  Hx. CVA.  Hx. COPD.    Plan:    - NPO until swallow studies clear pte.  - D/C Cheung to gravity   - Replace MG++  - IVFs to provide free H2O, HCO3- and K+  - f/u BMP    .

## 2017-06-14 NOTE — PT/OT/SLP PROGRESS
Occupational Therapy  Treatment    Seema Schreiber   MRN: [id number]   Admitting Diagnosis: Small bowel obstruction    OT Date of Treatment: 06/14/17   OT Start Time: 1144  OT Stop Time: 1216  OT Total Time (min): 32 min    Billable Minutes:  Self Care/Home Management 15, Therapeutic Activity 17 and Total Time 32    General Precautions: Standard, fall, aspiration, NPO  Orthopedic Precautions: N/A  Braces: N/A    Do you have any cultural, spiritual, Moravian conflicts, given your current situation?: no    Subjective:  Communicated with nurseMaia prior to session.    Pain/Comfort  Pain Rating 1:  (yes but did not rate)  Location - Side 1: Right  Location 1: foot  Pain Addressed 1: Cessation of Activity, Distraction    Objective:  Patient found with: PICC line, oxygen, telemetry     Functional Mobility:  Bed Mobility:  Scooting/Bridging: With assist of 2, Dependent  Sit to Supine: Dependent    Transfers:   Sit <> Stand Assistance:  (unable to stand)    Activities of Daily Living:  UE Dressing Level of Assistance: Total assistance  LE Dressing Level of Assistance: Total assistance  Grooming: The patient was able to wipe her hands and face with SBA.    Balance:   Static Sit: FAIR: Maintains without assist, but unable to take any challenges   Dynamic Sit: FAIR: Cannot move trunk without losing balance    Therapeutic Activities  The patient tolerated sitting EOB ~15 min. With encouragement.    AM-PAC 6 CLICK ADL   How much help from another person does this patient currently need?   1 = Unable, Total/Dependent Assistance  2 = A lot, Maximum/Moderate Assistance  3 = A little, Minimum/Contact Guard/Supervision  4 = None, Modified [place]/Independent    Putting on and taking off regular lower body clothing? : 1  Bathing (including washing, rinsing, drying)?: 2  Toileting, which includes using toilet, bedpan, or urinal? : 2  Putting on and taking off regular upper body clothing?: 2  Taking care of personal grooming such as  "brushing teeth?: 3  Eating meals?: 1  Total Score: 11     AM-PAC Raw Score CMS "G-Code Modifier Level of Impairment Assistance   6 % Total / Unable   7 - 8 CM 80 - 100% Maximal Assist   9-13 CL 60 - 80% Moderate Assist   14 - 19 CK 40 - 60% Moderate Assist   20 - 22 CJ 20 - 40% Minimal Assist   23 CI 1-20% SBA / CGA   24 CH 0% Independent/ Mod I       Patient left right sidelying with all lines intact, call button in reach and nurse, Maia notified    ASSESSMENT:  Seema Schreiber is a 86 y.o. female with a medical diagnosis of Small bowel obstruction. The patient participated in OT with encouragement.    Rehab identified problem list/impairments: Rehab identified problem list/impairments: weakness, impaired endurance, gait instability, impaired functional mobilty, impaired self care skills, impaired balance, decreased upper extremity function, pain, impaired cardiopulmonary response to activity, decreased safety awareness    Rehab potential is good.    Activity tolerance: Fair    Discharge recommendations: Discharge Facility/Level Of Care Needs: nursing facility, skilled     Barriers to discharge: Barriers to Discharge: Decreased caregiver support    Equipment recommendations:  (TBD)     GOALS:    Occupational Therapy Goals        Problem: Occupational Therapy Goal    Goal Priority Disciplines Outcome Interventions   Occupational Therapy Goal     OT, PT/OT Ongoing (interventions implemented as appropriate)    Description:  Goals to be met by: 6/26/17    Patient will increase functional independence with ADLs by performing:    Feeding with to be assessed as able.  UE Dressing with Minimal Assistance.  LE Dressing with Moderate Assistance.  Grooming while seated with Set-up Assistance and Stand-by Assistance.  Toileting from bedside commode with Stand-by Assistance for hygiene and clothing management.   Supine to sit with Minimal Assistance.  Stand pivot transfers with Minimal Assistance.  Toilet transfer to " bedside commode with Minimal Assistance.  Upper extremity exercise program x10 reps per handout, with assistance as needed.                      Plan:  Patient to be seen 5 x/week to address the above listed problems via self-care/home management, therapeutic activities, therapeutic exercises  Plan of Care expires: 06/26/17  Plan of Care reviewed with: patient         Casandra Rodarteean, OT  06/14/2017

## 2017-06-14 NOTE — PT/OT/SLP PROGRESS
Speech Language Pathology  Treatment    Seema Schreiber   MRN: [id number]   Admitting Diagnosis: Small bowel obstruction    Diet recommendations: Solid Diet Level: NPO  Liquid Diet Level: NPO   · Strict aspiration precaution  · Routine oral care  · Consider alternate means nutrition/mediation/hydration    SLP Treatment Date: 06/14/17  Speech Start Time: 1310     Speech Stop Time: 1334     Speech Total (min): 24 min       TREATMENT BILLABLE MINUTES:  Treatment Swallowing Dysfunction 24 minutes and Total Time 24 minutes    Has the patient been evaluated by SLP for swallowing? : Yes  Keep patient NPO?: Yes   General Precautions: Standard, aspiration, fall, NPO  Current Respiratory Status: nasal cannula       Subjective:  Pt semi-reclined in bed with daughter at b/s.  Pt awake, alert, and agreeable to tx this date.     Pain/Comfort  Pain Rating 1: 0/10    Objective:    Pt repositioned near 90' for therapy.  Oral care provided to improve swallow safety and facilitate oral motor mvmt.  Pt with open mouth breathing t/o session.  Dry/cracked oral mucosa noted.  SLP removed 3 quarter sized pieces of dry, hardened secretions from Pt's oral cavity, including the roof of her mouth.  Pooled secretions noted in buccal cavities prior to presentation of PO trials.  Pooled secretions removed by suction prior to PO trials.  Pt accepted two ice chips.  Pt with bolus holding and limited manipulation of bolus.  Pt with cough response directly after swallow initiated in 2/2 ice chip trials.  Pt accepted half tsp puree trials x2. Pt with bolus holding and limited control and manipulation of bolus.  Pt appeared to loose nearly all of the bolus to the floor of her mouth and bilat buccal cavities.  Pt with cough and throat clearing directly after swallow initiation in 2/2 puree trials.  Reduced laryngeal elevation, swallow delay, and laryngeal pumping suspected upon palpation.   Suction provided to remove moderate oral/buccal stasis from Pt's  mouth.  No further PO trials presented.  SLP provided skilled education re: s/s dysphagia, s/s aspiration, SLP rec for continued NPO status and alternate means of nutrition/medication/hydration, Pt's very high risk of aspiration and sequela, role of ST and ST POC. Pt and her daughter verbalized understanding, though reinforcement is recommended.       Assessment:  Seema Schreiber is a 86 y.o. female with a medical diagnosis of Small bowel obstruction and presents with severe dysphagia c/b overt s/s aspiration with ice chip and puree trials.   Pt remains very high risk of aspiration of all PO at this time 2/2 poor swallow function, oral residue, and open mouth breathing.      Discharge recommendations: Discharge Facility/Level Of Care Needs:  (TBD)     Goals:    SLP Goals        Problem: SLP Goal    Goal Priority Disciplines Outcome   SLP Goal     SLP Ongoing (interventions implemented as appropriate)   Description:  Short term goals:  1) assess po intake when able  2) oral motor exercises                     Plan: Continue NPO status; consider alternate means nutrition; dysphagia tx; ongoing assessment of swallow function  Patient to be seen Therapy Frequency: 3 x/week   Plan of Care expires: 06/16/17  Plan of Care reviewed with: patient, family  SLP Follow-up?: Yes  SLP - Next Visit Date: 06/15/17           GEE Bright, CCC-SLP  06/14/2017

## 2017-06-15 NOTE — PROGRESS NOTES
Pt Laborly breathing LUIS F Carvalho and Dr. Turner Informed.  , RR 30, Sa02 95 on 2 lpm NC. Pt seems confused removing mask of bronchodilator tx repeatedly. RT recommended Bipap, RT will continue to monitor.

## 2017-06-15 NOTE — SUBJECTIVE & OBJECTIVE
Past Medical History:   Diagnosis Date    CHF (congestive heart failure)     COPD (chronic obstructive pulmonary disease)     MI, acute, non ST segment elevation        No past surgical history on file.    Review of patient's allergies indicates:   Allergen Reactions    Codeine     Penicillins        No current facility-administered medications on file prior to encounter.      No current outpatient prescriptions on file prior to encounter.     Family History     None        Social History Main Topics    Smoking status: Unknown If Ever Smoked    Smokeless tobacco: Not on file    Alcohol use Not on file    Drug use: Unknown    Sexual activity: Not on file     Review of Systems   Unable to perform ROS: other   ?senile dementia    Objective:     Vital Signs (Most Recent):  Temp: 98.4 °F (36.9 °C) (06/15/17 0835)  Pulse: 106 (06/15/17 0850)  Resp: (!) 25 (06/15/17 0850)  BP: (!) 114/53 (06/15/17 0835)  SpO2: 95 % (06/15/17 0850) Vital Signs (24h Range):  Temp:  [97.9 °F (36.6 °C)-100.2 °F (37.9 °C)] 98.4 °F (36.9 °C)  Pulse:  [] 106  Resp:  [20-25] 25  SpO2:  [95 %-100 %] 95 %  BP: ()/(53-65) 114/53     Weight: 67.5 kg (148 lb 12.8 oz)  Body mass index is 25.54 kg/m².    SpO2: 95 %  O2 Device (Oxygen Therapy): nasal cannula      Intake/Output Summary (Last 24 hours) at 06/15/17 0855  Last data filed at 06/15/17 0432   Gross per 24 hour   Intake              850 ml   Output              800 ml   Net               50 ml       Lines/Drains/Airways     Peripherally Inserted Central Catheter Line                 PICC Triple Lumen 06/11/17 0456  4 days                Physical Exam   Constitutional:   Chr ill elderly woman, NAD   HENT:   Head: Normocephalic and atraumatic.   Eyes: Conjunctivae and EOM are normal. Pupils are equal, round, and reactive to light. No scleral icterus.   Neck: No JVD present. No tracheal deviation present. No thyromegaly present.   Cardiovascular: Normal rate and regular rhythm.   Exam reveals distant heart sounds. Exam reveals no gallop and no friction rub.    No murmur heard.  Pulmonary/Chest: Effort normal and breath sounds normal. No respiratory distress. She has no wheezes.   Abdominal: Soft. She exhibits no distension. There is no tenderness.   Decreased BS   Musculoskeletal: She exhibits no edema or tenderness.   Neurological: She is alert.   Facial droop   Skin: Skin is warm and dry.   Psychiatric: She has a normal mood and affect. Her behavior is normal.       Current Medications:   albuterol-ipratropium 2.5mg-0.5mg/3mL  3 mL Nebulization Q8H WA    aspirin  300 mg Rectal Daily    enoxaparin  30 mg Subcutaneous Daily    levothyroxine  12.5 mcg Intravenous Daily    tuberculin  5 Units Intradermal Once      Amino acid 4.25% - dextrose 25% (CLINIMIX-E) solution with additives (1L provides 42.5 gm AA, 250 gm CHO (850 kcal/L dextrose), Na 35, K 30, Mg 5, Ca 4.5, Acetate 80, Cl 39, Phos 15) 80 mL/hr at 06/14/17 2241     acetaminophen, hydrALAZINE, metoprolol, ondansetron    Laboratory:  CBC:    Recent Labs  Lab 06/11/17  0513 06/12/17  0422 06/15/17  0506   WHITE BLOOD CELL COUNT 9.02 10.39 9.27   HEMOGLOBIN 10.9 L 9.6 L 8.7 L   HEMATOCRIT 34.5 L 30.1 L 27.4 L   PLATELETS 323 250 239       CHEMISTRIES:    Recent Labs  Lab 06/13/17  0452 06/14/17  0519 06/15/17  0506   GLUCOSE 98 93 100   SODIUM 143 145 144   POTASSIUM 2.9 L 3.5 3.4 L   BUN BLD 44 H 39 H 32 H   CREATININE 2.5 H 1.9 H 1.5 H   EGFR IF  19 A 27 A 36 A   EGFR IF NON- 17 A 24 A 31 A   CALCIUM 7.8 L 8.0 L 8.7   MAGNESIUM 1.0 L 1.4 L 1.8       CARDIAC BIOMARKERS:    Recent Labs  Lab 06/11/17  1235 06/11/17  1538 06/12/17  0105   TROPONIN I 0.052 H 0.056 H 0.046 H       COAGS:        LIPIDS/LFTS:    Recent Labs  Lab 06/11/17  0513 06/11/17  1235 06/12/17  0105   CHOLESTEROL  --  113 L  --    TRIGLYCERIDES  --  191 H  --    HDL  --  13 L  --    LDL CHOLESTEROL  --  61.8 L  --    NON-HDL  CHOLESTEROL  --  100  --    AST 50 H  --  33  33   ALT 13  --  9 L  9 L           Diagnostic Results:  ECG (personally reviewed tracings):   6/11/17 0521 , inflat ST abnl ?isch  6/12/17 1345 SR 96, NSSTTW changes    Chest X-Ray (personally reviewed image(s)): 6/11/17 NAD    Echo: 6/12/17 (images pers rev)    1 - Severely depressed left ventricular systolic function (EF 25-30%).     2 - Severe global hypokinesis.     3 - Severe aortic stenosis, GENE = 0.79 cm2, peak velocity = 4.85 m/s, mean gradient = 52 mmHg.     4 - Mild mitral regurgitation.     5 - Trivial tricuspid regurgitation.     6 - The estimated PA systolic pressure is greater than 33 mmHg.

## 2017-06-15 NOTE — PROGRESS NOTES
"gen surg  No abd pain , no n/v, had bm last night  Blood pressure (!) 106/54, pulse 109, temperature 97.9 °F (36.6 °C), temperature source Oral, resp. rate 20, height 5' 4" (1.626 m), weight 67.5 kg (148 lb 12.8 oz), SpO2 97 %, not currently breastfeeding.  abd soft, nl bs, ntnd, no masses  A/p  sbo-appears resolved, ok for po intake once cleared by speech therapy  "

## 2017-06-15 NOTE — PT/OT/SLP PROGRESS
Occupational Therapy  Treatment    Seema Schreiber   MRN: [id number]   Admitting Diagnosis: Small bowel obstruction    OT Date of Treatment: 06/15/17   OT Start Time: 1047  OT Stop Time: 1105  OT Total Time (min): 18 min    Billable Minutes:  Self Care/Home Management 5 and Therapeutic Activity 13    General Precautions: Standard, aspiration, NPO, fall  Orthopedic Precautions: N/A  Braces: N/A    Do you have any cultural, spiritual, Buddhist conflicts, given your current situation?: no    Subjective:  Communicated with nurse prior to session.    Pain/Comfort  Pain Rating 1: 0/10  Pain Rating Post-Intervention 1: 0/10    Objective:  Patient found with: PICC line, oxygen, telemetry     Functional Mobility:  Bed Mobility:  Rolling/Turning to Left: Moderate assistance  Scooting/Bridging: Dependent  Supine to Sit: Maximum Assistance  Sit to Supine: Maximum Assistance    Activities of Daily Living:  Feeding Level of Assistance:  (NPO)  LE Dressing Level of Assistance: Total assistance (socks)  Grooming Position: EOB  Grooming Level of Assistance: Contact guard assistance        Balance:   Static Sit: FAIR: Maintains without assist, but unable to take any challenges   Dynamic Sit: FAIR: Cannot move trunk without losing balance    Therapeutic Activities and Exercises:  Patient completed bed mobility to EOB, sat 13 mins before scooting, to HOB, and sit to supine.  Grooming completed at EOB    AM-PAC 6 CLICK ADL   How much help from another person does this patient currently need?   1 = Unable, Total/Dependent Assistance  2 = A lot, Maximum/Moderate Assistance  3 = A little, Minimum/Contact Guard/Supervision  4 = None, Modified [place]/Independent    Putting on and taking off regular lower body clothing? : 1  Bathing (including washing, rinsing, drying)?: 2  Toileting, which includes using toilet, bedpan, or urinal? : 1  Putting on and taking off regular upper body clothing?: 2  Taking care of personal grooming such as  "brushing teeth?: 3  Eating meals?: 1  Total Score: 10     AM-PAC Raw Score CMS "G-Code Modifier Level of Impairment Assistance   6 % Total / Unable   7 - 8 CM 80 - 100% Maximal Assist   9-13 CL 60 - 80% Moderate Assist   14 - 19 CK 40 - 60% Moderate Assist   20 - 22 CJ 20 - 40% Minimal Assist   23 CI 1-20% SBA / CGA   24 CH 0% Independent/ Mod I       Patient left HOB elevated with all lines intact, call button in reach, bed alarm on and MD present    ASSESSMENT:  Seema Schreiber is a 86 y.o. female with a medical diagnosis of Small bowel obstruction and presents with rehab impairments affecting initiation, sequencing and completion of ADLs this date.    Rehab identified problem list/impairments: Rehab identified problem list/impairments: weakness, impaired endurance, impaired self care skills, gait instability, impaired functional mobilty, impaired balance, decreased coordination, decreased safety awareness, impaired cognition, decreased upper extremity function, decreased lower extremity function, impaired cardiopulmonary response to activity    Rehab potential is good.    Activity tolerance: Fair+    Discharge recommendations: Discharge Facility/Level Of Care Needs: nursing facility, skilled     Barriers to discharge: Barriers to Discharge: Decreased caregiver support    Equipment recommendations:  (TBD as needed)     GOALS:    Occupational Therapy Goals        Problem: Occupational Therapy Goal    Goal Priority Disciplines Outcome Interventions   Occupational Therapy Goal     OT, PT/OT Ongoing (interventions implemented as appropriate)    Description:  Goals to be met by: 6/26/17    Patient will increase functional independence with ADLs by performing:    Feeding with to be assessed as able.  UE Dressing with Minimal Assistance.  LE Dressing with Moderate Assistance.  Grooming while seated with Set-up Assistance and Stand-by Assistance.  Toileting from bedside commode with Stand-by Assistance for hygiene " and clothing management.   Supine to sit with Minimal Assistance.  Stand pivot transfers with Minimal Assistance.  Toilet transfer to bedside commode with Minimal Assistance.  Upper extremity exercise program x10 reps per handout, with assistance as needed.                      Plan:  Patient to be seen 5 x/week to address the above listed problems via self-care/home management, therapeutic activities, therapeutic exercises  Plan of Care expires: 06/26/17  Plan of Care reviewed with: patient         Pat Bolton, OT  06/15/2017

## 2017-06-15 NOTE — PROGRESS NOTES
[facility]  Cardiology  Progress Note    Patient Name: Seema Schreiber  MRN: [id number]  Admission Date: 6/11/2017  Hospital Length of Stay: 4 days  Code Status: Full Code   Attending Physician: Eri Alfred MD   Primary Care Physician: Dajuan Guthrie MD  Expected Discharge Date:   Principal Problem:Small bowel obstruction    Subjective:     Interval Hx:  No complaints.    Tele: SR/STachy, PACs, PVCs. (pers rev)    Past Medical History:   Diagnosis Date    CHF (congestive heart failure)     COPD (chronic obstructive pulmonary disease)     MI, acute, non ST segment elevation        No past surgical history on file.    Review of patient's allergies indicates:   Allergen Reactions    Codeine     Penicillins        No current facility-administered medications on file prior to encounter.      No current outpatient prescriptions on file prior to encounter.     Family History     None        Social History Main Topics    Smoking status: Unknown If Ever Smoked    Smokeless tobacco: Not on file    Alcohol use Not on file    Drug use: Unknown    Sexual activity: Not on file     Review of Systems   Unable to perform ROS: other   ?senile dementia    Objective:     Vital Signs (Most Recent):  Temp: 98.4 °F (36.9 °C) (06/15/17 0835)  Pulse: 106 (06/15/17 0850)  Resp: (!) 25 (06/15/17 0850)  BP: (!) 114/53 (06/15/17 0835)  SpO2: 95 % (06/15/17 0850) Vital Signs (24h Range):  Temp:  [97.9 °F (36.6 °C)-100.2 °F (37.9 °C)] 98.4 °F (36.9 °C)  Pulse:  [] 106  Resp:  [20-25] 25  SpO2:  [95 %-100 %] 95 %  BP: ()/(53-65) 114/53     Weight: 67.5 kg (148 lb 12.8 oz)  Body mass index is 25.54 kg/m².    SpO2: 95 %  O2 Device (Oxygen Therapy): nasal cannula      Intake/Output Summary (Last 24 hours) at 06/15/17 0855  Last data filed at 06/15/17 0432   Gross per 24 hour   Intake              850 ml   Output              800 ml   Net               50 ml       Lines/Drains/Airways     Peripherally Inserted  Central Catheter Line                 PICC Triple Lumen 06/11/17 0456  4 days                Physical Exam   Constitutional:   Chr ill elderly woman, NAD   HENT:   Head: Normocephalic and atraumatic.   Eyes: Conjunctivae and EOM are normal. Pupils are equal, round, and reactive to light. No scleral icterus.   Neck: No JVD present. No tracheal deviation present. No thyromegaly present.   Cardiovascular: Normal rate and regular rhythm.  Exam reveals distant heart sounds. Exam reveals no gallop and no friction rub.    No murmur heard.  Pulmonary/Chest: Effort normal and breath sounds normal. No respiratory distress. She has no wheezes.   Abdominal: Soft. She exhibits no distension. There is no tenderness.   Decreased BS   Musculoskeletal: She exhibits no edema or tenderness.   Neurological: She is alert.   Facial droop   Skin: Skin is warm and dry.   Psychiatric: She has a normal mood and affect. Her behavior is normal.       Current Medications:   albuterol-ipratropium 2.5mg-0.5mg/3mL  3 mL Nebulization Q8H WA    aspirin  300 mg Rectal Daily    enoxaparin  30 mg Subcutaneous Daily    levothyroxine  12.5 mcg Intravenous Daily    tuberculin  5 Units Intradermal Once      Amino acid 4.25% - dextrose 25% (CLINIMIX-E) solution with additives (1L provides 42.5 gm AA, 250 gm CHO (850 kcal/L dextrose), Na 35, K 30, Mg 5, Ca 4.5, Acetate 80, Cl 39, Phos 15) 80 mL/hr at 06/14/17 2241     acetaminophen, hydrALAZINE, metoprolol, ondansetron    Laboratory:  CBC:    Recent Labs  Lab 06/11/17  0513 06/12/17  0422 06/15/17  0506   WHITE BLOOD CELL COUNT 9.02 10.39 9.27   HEMOGLOBIN 10.9 L 9.6 L 8.7 L   HEMATOCRIT 34.5 L 30.1 L 27.4 L   PLATELETS 323 250 239       CHEMISTRIES:    Recent Labs  Lab 06/13/17  0452 06/14/17  0519 06/15/17  0506   GLUCOSE 98 93 100   SODIUM 143 145 144   POTASSIUM 2.9 L 3.5 3.4 L   BUN BLD 44 H 39 H 32 H   CREATININE 2.5 H 1.9 H 1.5 H   EGFR IF  19 A 27 A 36 A   EGFR IF NON-  AMERICAN 17 A 24 A 31 A   CALCIUM 7.8 L 8.0 L 8.7   MAGNESIUM 1.0 L 1.4 L 1.8       CARDIAC BIOMARKERS:    Recent Labs  Lab 06/11/17  1235 06/11/17  1538 06/12/17  0105   TROPONIN I 0.052 H 0.056 H 0.046 H       COAGS:        LIPIDS/LFTS:    Recent Labs  Lab 06/11/17  0513 06/11/17  1235 06/12/17  0105   CHOLESTEROL  --  113 L  --    TRIGLYCERIDES  --  191 H  --    HDL  --  13 L  --    LDL CHOLESTEROL  --  61.8 L  --    NON-HDL CHOLESTEROL  --  100  --    AST 50 H  --  33  33   ALT 13  --  9 L  9 L           Diagnostic Results:  ECG (personally reviewed tracings):   6/11/17 0521 , inflat ST abnl ?isch  6/12/17 1345 SR 96, NSSTTW changes    Chest X-Ray (personally reviewed image(s)): 6/11/17 NAD    Echo: 6/12/17 (images pers rev)    1 - Severely depressed left ventricular systolic function (EF 25-30%).     2 - Severe global hypokinesis.     3 - Severe aortic stenosis, GENE = 0.79 cm2, peak velocity = 4.85 m/s, mean gradient = 52 mmHg.     4 - Mild mitral regurgitation.     5 - Trivial tricuspid regurgitation.     6 - The estimated PA systolic pressure is greater than 33 mmHg.       Assessment and Plan:     Elevated troponin I level    Asymptomatic  Doubt ACS  Likely d/t renal failure  No plan for inpatient isch eval at this juncture, can consider as outpatient if pt deemed to be a revasc candidate (not clear she is a revasc candidate considering dementia, age, CRI, etc.)        Cardiomyopathy    Unclear etiology  Will consider ischemia eval as outpatient (see above)  Add coreg 3.125mg bid when pt able to take po  Consider ACEi pending improvement in pt's renal fxn        Nonrheumatic aortic valve stenosis    Severe  No plan for valve intervention at this juncture given absence of referable sxs.        Acute renal failure    Improving  Would like to start ACEi if creat continues to normalize and pt able to take PO.        * Small bowel obstruction    Per IM/GI/surgery  Seems to be improving  Awaiting swallow  eval prior to starting PO meds        Facial droop    Per IM/Neuro, ?CN VII palsy            VTE Risk Mitigation         Ordered     enoxaparin injection 30 mg  Daily     Route:  Subcutaneous        06/11/17 1055     Medium Risk of VTE  Once      06/11/17 0856     Place sequential compression device  Until discontinued      06/11/17 0856     Place ISHMAEL hose  Until discontinued      06/11/17 0856        Cardiology will sign off, pls call with questions.  Pt may follow up with me in office after discharge (or with cardiology closer to home in [facility])    Thang Turner MD  Cardiology  [facility]

## 2017-06-15 NOTE — ASSESSMENT & PLAN NOTE
No overt signs of bleeding / Anemia studies liekly 2/2 renal disease  Anemia studies. B12 and folic acid WNL. Iron in low normal range. Hgb decreased likely due to dilution.

## 2017-06-15 NOTE — PLAN OF CARE
Problem: Fall Risk (Adult)  Intervention: Review Medications/Identify Contributors to Fall Risk   06/15/17 0656   Safety Interventions   Medication Review/Management medications reviewed     Intervention: Patient Rounds   06/15/17 0652   Safety Interventions   Patient Rounds bed in low position;bed wheels locked;call light in reach;clutter free environment maintained;ID band on;placement of personal items at bedside;toileting offered;visualized patient     Intervention: Safety Promotion/Fall Prevention   06/15/17 0652   Safety Interventions   Safety Promotion/Fall Prevention bed alarm set;lighting adjusted;room near unit station;side rails raised x 3         Problem: Infection, Risk/Actual (Adult)  Intervention: Prevent Infection/Maximize Resistance   06/14/17 2000   Pain/Comfort/Sleep Interventions   Sleep/Rest Enhancement consistent schedule promoted;regular sleep/rest pattern promoted;relaxation techniques promoted   Hygiene Care   Bathing/Skin Care linen changed         Problem: Pressure Ulcer Risk (Patel Scale) (Adult,Obstetrics,Pediatric)  Intervention: Prevent/Minimize Sheer/Friction Injuries   06/11/17 1900 06/14/17 2000   Skin Interventions   Pressure Reduction Devices --  positioning supports utilized;pressure-redistributing mattress utilized   Pressure Reduction Techniques --  heels elevated off bed;pressure points protected   Positioning   Positioning/Transfer Devices wedge;pillows --      Intervention: Turn/Reposition Often   06/14/17 2000 06/15/17 0652   Skin Interventions   Pressure Reduction Techniques heels elevated off bed;pressure points protected --    Positioning   Body Position --  lower extremity elevated, left;lower extremity elevated, right;positioned/repositioned independently       Goal: Skin Integrity  Patient will demonstrate the desired outcomes by discharge/transition of care.    06/12/17 0006   Pressure Ulcer Risk (Patel Scale) (Adult,Obstetrics,Pediatric)   Skin Integrity making  progress toward outcome       Comments: Turned every two hours and wedge in place. Heels kept elevated off of mattress. Jones hose and SCDs in place. Called for bed pan when needed. TPN started as ordered. Skin remains intact call light at side.

## 2017-06-15 NOTE — PROGRESS NOTES
Renal Progress Note      Date of Admission: 6/11/2017  4:46 AM      ROS:  Unable to obtain due to clinical condition      Vitals:    06/15/17 0850   BP:    Pulse: 106   Resp: (!) 30   Temp:      I/O last 3 completed shifts:  In: 1850 [I.V.:1250; IV Piggyback:600]  Out: 1450 [Urine:1450]  I/O this shift:  In: -   Out: 300 [Urine:300]      Constitutional: Elderly female, thin, NAD  HENT: NGT in place  Head: Normocephalic.   Cardiovascular: Normal rate.  S1-S2  Pulmonary: CTA  Abdominal: Soft. She exhibits no distension, there is no tenderness Hypoactive BS   Neurologic: confused    Laboratories:      Recent Labs  Lab 06/15/17  0506   WBC 9.27   RBC 3.77*   HGB 8.7*   HCT 27.4*      MCV 73*   MCH 23.1*   MCHC 31.8*       Recent Labs  Lab 06/15/17  0506   CALCIUM 8.7      K 3.4*   CO2 26   *   BUN 32*   CREATININE 1.5*     Magnesium 1.6 1.0 1.4  Magnesium       Iron  45   Iron     TIBC   172  TIBC    Saturated Iron   26  Saturated Iron    Transferrin   116  116  Transferrin    Ferritin   182  Ferritin    Folate   17.9  Folate    Vitamin B-12   888  Vitamin      Phosphorus  5.4   Phosphorus     Magnesium   1.6  Magnesium     Microbiology Results (last 7 days)     Procedure Component Value Units Date/Time    Blood culture [[id number]] Collected:  06/12/17 1852    Order Status:  Completed Specimen:  Blood Updated:  06/14/17 2103     Blood Culture, Routine No Growth to date     Blood Culture, Routine No Growth to date     Blood Culture, Routine No Growth to date    Blood culture [[id number]] Collected:  06/13/17 1819    Order Status:  Completed Specimen:  Blood Updated:  06/14/17 1903     Blood Culture, Routine No Growth to date     Blood Culture, Routine No Growth to date    Urine culture [[id number]] Collected:  06/11/17 1133    Order Status:  Completed Specimen:  Urine from Urine, Catheterized Updated:  06/13/17 0747     Urine Culture, Routine No growth           Assessment:    85 y/o AAF admitted with:    Small bowel obstruction. - remains NPO -  Acute kidney injury (unknown baseline creatinine) but improving significantly.  Mild hypoK+  Metabolic acidosis - improving -  Anemia of chronic disease  Hypoalbuminemia severe protein malnutrition  Hx. CVA.  Hx. COPD.    Plan:    - NPO until swallow studies clear pte.  - TPN started per Hospitalist  -  Replace K+ and Mg++ as needed  - f/u Po4-    .

## 2017-06-15 NOTE — PT/OT/SLP PROGRESS
Speech Language Pathology  Treatment    Seema Schreiber   MRN: [id number]   Admitting Diagnosis: Small bowel obstruction    Diet recommendations: Solid Diet Level: NPO  Liquid Diet Level: NPO   · Strict aspiration precautions  · Routine oral care kit- with suction kit (SLP provided 24 hr suction oral care kit at b/s)  · Consider more long-term alternate means nutrition/medication/hydration    SLP Treatment Date: 06/15/17  Speech Start Time: 0930     Speech Stop Time: 0955     Speech Total (min): 25 min       TREATMENT BILLABLE MINUTES:  Treatment Swallowing Dysfunction 25 minutes and Total Time 25 minutes    Has the patient been evaluated by SLP for swallowing? : Yes  Keep patient NPO?: Yes   General Precautions: Standard, aspiration, fall, NPO  Current Respiratory Status: nasal cannula       Subjective:  SLP f/u with Pt this morning for dysphagia therapy.  Noted Respiratory Therapy note this am reporting Pt with labored breathing and rec for Bipap. Pt appeared with labored and open mouth breathing upon SLP's arrival; O2 via NC.      Pain/Comfort  Pain Rating 1: 0/10    Objective:      Oral care provided with toothette to facilitate command following, increase swallow safety and facilitate oral motor mvmt.  Pt with coughing/overt s/s aspiration during oral care this date.  Oral suction provided, as Pt with difficulty tolerating secretions pooling in oral cavity during oral care.  Pt participated in Thermal Tactile Stimulation (TTS) trials with iced lemon glycerine swabs, delayed swallow elicited on 3 of 6 trials; Pt with absent swallow reflex on 3/6 trials, despite max cues.  Pt performed OMEs and laryngeal elevation exercises with fair ability and effort, provided mod-max cueing. Pt noted to have difficulty closing mouth with adequate labial seal. Pt with moderately reduced ROM, strength, and coordination of oral musculature. Pt reported fatigue following swallowing exercises.        Rec continued NPO and consideration  of more long-term alternate means of nutrition/medication/hydration.  Rec use of oral care kit that connects to suction 2/2 Pt with overt s/s aspiration during oral care this date.  Oral care suction kit provided at Pt's B/s by SLP.  ST will follow for ongoing dysphagia therapy.        Assessment:  Seema Schreiber is a 86 y.o. female with a medical diagnosis of Small bowel obstruction and presents with severe persistent oropharyngeal dysphagia c/b overt s/s aspiration on secretions alone during oral care this date. Rec continued NPO and consideration of more long-term alternate means of nutrition/medication/hydration.  Rec use of oral care kit that connects to suction 2/2 Pt with overt s/s aspiration during oral care this date.  Oral care suction kit provided at Pt's b/s by SLP. Discussed with Pt's MD.  Discharge recommendations: Discharge Facility/Level Of Care Needs: nursing facility, skilled     Goals:    SLP Goals        Problem: SLP Goal    Goal Priority Disciplines Outcome   SLP Goal     SLP Ongoing (interventions implemented as appropriate)   Description:  Short term goals:  1) assess po intake when able  2) oral motor exercises                     Plan: ST will follow for ongoing dysphagia therapy.  Patient to be seen Therapy Frequency: 3 x/week   Plan of Care expires: 06/16/17  Plan of Care reviewed with: patient  SLP Follow-up?: Yes  SLP - Next Visit Date: 06/15/17           GEE Bright, CCC-SLP  06/15/2017

## 2017-06-15 NOTE — SUBJECTIVE & OBJECTIVE
Interval History: patient had one episode of fever this AM, however non toxic appearance. CXR did not suggest pneumonia. Blood and UCx with no growth. Does have a central line in place. Had a BM    Review of Systems   Constitutional: Negative for activity change.   Respiratory: Negative for chest tightness and shortness of breath.    Cardiovascular: Negative for chest pain.   Gastrointestinal: Negative for abdominal pain, diarrhea and vomiting.   Genitourinary: Negative.    Musculoskeletal: Negative.    Neurological: Negative.    Psychiatric/Behavioral: Negative.      Objective:     Vital Signs (Most Recent):  Temp: (!) 101 °F (38.3 °C) (06/15/17 1100)  Pulse: 79 (06/15/17 1625)  Resp: (!) 28 (06/15/17 1625)  BP: (!) 104/53 (06/15/17 1100)  SpO2: 96 % (06/15/17 1625) Vital Signs (24h Range):  Temp:  [97.9 °F (36.6 °C)-101 °F (38.3 °C)] 101 °F (38.3 °C)  Pulse:  [] 79  Resp:  [20-30] 28  SpO2:  [95 %-100 %] 96 %  BP: ()/(53-59) 104/53     Weight: 67.5 kg (148 lb 12.8 oz)  Body mass index is 25.54 kg/m².    Intake/Output Summary (Last 24 hours) at 06/15/17 1642  Last data filed at 06/15/17 1000   Gross per 24 hour   Intake              350 ml   Output              750 ml   Net             -400 ml      Physical Exam   Constitutional: She appears well-developed and well-nourished.   HENT:   Head: Normocephalic.   NG removed   Eyes: Conjunctivae and EOM are normal.   Cardiovascular: Normal rate.    Pulmonary/Chest: Effort normal and breath sounds normal. No respiratory distress. She has no wheezes. She has no rales.   On low flow NC   Abdominal: Soft. Bowel sounds are normal. She exhibits no distension. There is no tenderness. There is no guarding.   Musculoskeletal: Normal range of motion. She exhibits no edema.   Neurological: She is alert.   Skin: Skin is warm and dry.   Central line in place   Psychiatric: She has a normal mood and affect. Her behavior is normal. Judgment and thought content normal.    Vitals reviewed.      Significant Labs: All pertinent labs within the past 24 hours have been reviewed.    Magnesium:    Recent Labs  Lab 06/14/17  0519 06/15/17  0506   MG 1.4* 1.8       Significant Imaging: I have reviewed all pertinent imaging results/findings within the past 24 hours.  I have reviewed and interpreted all pertinent imaging results/findings within the past 24 hours.

## 2017-06-15 NOTE — PLAN OF CARE
Problem: SLP Goal  Goal: SLP Goal  Short term goals:  1) assess po intake when able  2) oral motor exercises   Outcome: Ongoing (interventions implemented as appropriate)  SLP f/u with Pt this morning for dysphagia therapy.  Noted Respiratory Therapy note this am reporting Pt with labored breathing and rec for Bipap. Pt appeared with labored and open mouth breathing upon SLP's arrival; O2 via NC.    Oral care provided with toothette; Pt with coughing/overt s/s aspiration during oral care this date.  Oral suction provided, as Pt with difficulty tolerating secretions pooling in oral cavity during oral care.  Pt participated in Thermal Tactile Stimulation (TTS) trials with iced lemon glycerine swabs, delayed swallow elicited on 3 of 6 trials; Pt with absent swallow reflex on 3/6 trials, despite max cues.  Pt performed OMEs and laryngeal elevation exercises with fair ability and effort, provided mod-max cueing. Pt noted to have difficulty closing mouth with adequate labial seal. Pt reported fatigue following swallowing exercises.  Spoke to Pt's RN and MD re: swallow function.  Rec continued NPO and consideration of more long-term alternate means of nutrition/medication/hydration.  Rec use of oral care kit that connects to suction 2/2 Pt with overt s/s aspiration during oral care this date.  Oral care suction kit provided at Pt's B/s by SLP.  ST will follow for ongoing dysphagia therapy.

## 2017-06-15 NOTE — PLAN OF CARE
06/15/17 1352   Discharge Assessment   Assessment Type Discharge Planning Reassessment   Confirmed/corrected address and phone number on facesheet? Yes   Assessment information obtained from? Medical Record   Communicated expected length of stay with patient/caregiver no   Type of Healthcare Directive Received (None)   Prior to hospitilization cognitive status: Alert/Oriented   Prior to hospitalization functional status: Independent;Assistive Equipment   Current cognitive status: Alert/Oriented   Current Functional Status: Assistive Equipment  (Severe dysphagia.)   Arrived From home or self-care   Lives With child(magnolia), adult  (Daughter, Enedelia Gonzalez)   Able to Return to Prior Arrangements yes   Is patient able to care for self after discharge? No  (Pt has [facility] 4 hours/week.)   How many people do you have in your home that can help with your care after discharge? 1   Who are your caregiver(s) and their phone number(s)? (Daughter, Enedelia Gonzalez [phone].)   Patient's perception of discharge disposition home or selfcare   Readmission Within The Last 30 Days no previous admission in last 30 days   Patient currently being followed by outpatient case management? No   Patient currently receives home health services? No  (Pt does receive [facility], 4 hours/week.)   Does the patient currently use HME? Yes   Patient currently receives private duty nursing? N/A   Patient currently receives any other outside agency services? Yes   How many hours a day does the patient receive services? 4   Is it the patient/care giver preference to resume care with the current outside agency? Yes   Equipment Currently Used at Home bedside commode;oxygen;walker, rolling  (nebulizer.)   Do you have any problems affording any of your prescribed medications? No   Is the patient taking medications as prescribed? yes   Do you have any financial concerns preventing you from receiving the healthcare you need? No   Does the patient have  transportation to healthcare appointments? Yes   Transportation Available family or friend will provide   On Dialysis? No   Does the patient receive services at the [facility]? No   Are there any open cases? No   Discharge Plan A Home with family   Patient/Family In Agreement With Plan unable to assess

## 2017-06-15 NOTE — PROGRESS NOTES
"[facility]  Progress Note    Patient Name: Seema Schreiber  MRN: [id number]  Patient Class: IP- Inpatient   Admission Date: 6/11/2017  Length of Stay: 4 days  Attending Physician: Eri Alfred MD  Primary Care Provider: Dajuan Guthrie MD        Subjective:     Principal Problem:Small bowel obstruction    HPI:  Seema Schreiber is a 86 y.o. AAF with history of MI, COPD? And CHF? who presented to [facility] from [facility]. According to documentation she was transferred for evaluation of confirmed stroke and small bowel obstruction as seen on CT at [facility]. Per EMS, pt had L-sided facial drooping and altered mental status.     Daughters initially brought pt to [place] for increased confusion (x1 day), decreased appetite (x4 days), and weakness. Pt is normally able to ambulate by herself but has been unable to these past few days. Daughters deny emesis.   Patient is awake and alert at the time of interview, denies constipation or emesis states, "I keep harking like I want to throw up". She reports last BM as yesterday, says it was formed and normal. She denies any chronic pain issues or chronic use of narcotics. Of note patient has well distributed macular papular rash on BL thighs and trunk. States her PCP told her that she had the mumps about 1 month ago, she tells me, "they said they can't do nothing about it".    Patient's labs reveal hypernatremia, dehydration with acute on chronic renal failure III. Her physical assessment is negative for acute bowel but bowel sounds completely absent. She has NG tube in place and denies NV abdominal pain or diarrhea. There is slight L facial drooping with slight blinking eyelid delay.    Hospital Course:  The patient was admitted to the hospital for eval and treatment of a small bowel obstruction, left facial droop, hypovolemic hypernatremia and pre-renal SAVITA (Cr 4). For an unknown reason, the patient was initially " sent to the ICU but sent out the same day. MRI of her head was negative for any acute change. Per labs, patient also noted to be hypothyroid (TSH 7.4, free T4 0.8) and mild troponemia. 2D Echo revealed severe aortic valve stenosis and LVEF 25%. Per daughter, this is an old issue. Initiated on rectal ASA, howeve unable to start on BB or stating due to SBO at the time. Chest pain free. Troponemia likely demand and underlying renal dysfunction. Surgery, cardiology and nephrology consulted. NG tube placed to suction, empiric cipro/flagyl and initiated on IVFs. Small obstruction and hypernatremia eventually resolved, however severe dysphagia was noted to be an issue and is persistent. MRI of brain without acute stroke, but rather chronic cerebrovascular disease. SAVITA also improved with IVFs (gently infused given Syst HF) NG tube had already been removed by surgery. Patient insisted on not putting NG tube back in despite dysphagia. I suggested starting tube feeds but patient would like to continue with TPN. GI consulted for PEG.       Interval History: patient had one episode of fever this AM, however non toxic appearance. CXR did not suggest pneumonia. Blood and UCx with no growth. Does have a central line in place. Had a BM    Review of Systems   Constitutional: Negative for activity change.   Respiratory: Negative for chest tightness and shortness of breath.    Cardiovascular: Negative for chest pain.   Gastrointestinal: Negative for abdominal pain, diarrhea and vomiting.   Genitourinary: Negative.    Musculoskeletal: Negative.    Neurological: Negative.    Psychiatric/Behavioral: Negative.      Objective:     Vital Signs (Most Recent):  Temp: (!) 101 °F (38.3 °C) (06/15/17 1100)  Pulse: 79 (06/15/17 1625)  Resp: (!) 28 (06/15/17 1625)  BP: (!) 104/53 (06/15/17 1100)  SpO2: 96 % (06/15/17 1625) Vital Signs (24h Range):  Temp:  [97.9 °F (36.6 °C)-101 °F (38.3 °C)] 101 °F (38.3 °C)  Pulse:  [] 79  Resp:  [20-30]  28  SpO2:  [95 %-100 %] 96 %  BP: ()/(53-59) 104/53     Weight: 67.5 kg (148 lb 12.8 oz)  Body mass index is 25.54 kg/m².    Intake/Output Summary (Last 24 hours) at 06/15/17 1642  Last data filed at 06/15/17 1000   Gross per 24 hour   Intake              350 ml   Output              750 ml   Net             -400 ml      Physical Exam   Constitutional: She appears well-developed and well-nourished.   HENT:   Head: Normocephalic.   NG removed   Eyes: Conjunctivae and EOM are normal.   Cardiovascular: Normal rate.    Pulmonary/Chest: Effort normal and breath sounds normal. No respiratory distress. She has no wheezes. She has no rales.   On low flow NC   Abdominal: Soft. Bowel sounds are normal. She exhibits no distension. There is no tenderness. There is no guarding.   Musculoskeletal: Normal range of motion. She exhibits no edema.   Neurological: She is alert.   Skin: Skin is warm and dry.   Central line in place   Psychiatric: She has a normal mood and affect. Her behavior is normal. Judgment and thought content normal.   Vitals reviewed.      Significant Labs: All pertinent labs within the past 24 hours have been reviewed.    Magnesium:    Recent Labs  Lab 06/14/17  0519 06/15/17  0506   MG 1.4* 1.8       Significant Imaging: I have reviewed all pertinent imaging results/findings within the past 24 hours.  I have reviewed and interpreted all pertinent imaging results/findings within the past 24 hours.    Assessment/Plan:      * Small bowel obstruction    NG tube removed today as obstruction had resolved. However, patient is to remain NPO due to severe dysphagia. I discussed replacing NG tube for tube feeds but patient declined and preferred TPN instead.             Oropharyngeal dysphagia    This is severe. No acute stroke per MRI. Strictly NPO. Unfortunately NG tube has been removed once SBO appeared to have resolved, and patient does not want it back in.On TPN temporarily. I discussed patient's progress  with Speech therapist. Patient showed no signs of improvement over the past 3 days. Discussed PEG placement. I discussed with patient and daughter (Enedelia Gonzalez), who are in agreement. GI consult placed. Appreciate recs.           Chronic systolic heart failure    LVEF of 25% and severe aortic stenosis. Medical management at this time. No chest pain or shortness of breath. Cannot give statin as she is strictly NPO. ON ASA rectally. Will add carvedilol when able to take PO. Cannot start ACE-I or ARB due to renal dysfunction. Considering ischemic eval as outpatient. BP at goal. IVFs stopped. On TPN. Appreciate further cards recs          Acute renal failure    Continue TPN and strict I/Os. Is on ASA rectally for cerebrovascular disease. No acute stroke. Renal function improving. Appreciate further nephro recs. Cheung removed today          Low grade fever    Had one episode of 101F. No leukocytosis. No consolidation on XRay. Recent UCx and BCx negative.           Acquired hypothyroidism    Changed levothyroxine to IV temporarily given dysphagia          Nonrheumatic aortic valve stenosis    Severe, per Echo. No acute issues          Cardiomyopathy              Weakness    Lives with daughter. Walks with rolling walker. PT/OT consulted. Recommending SNF (needs PEG before she can go)          Severe malnutrition    F/u Speech recs. TPN per nutrition recs          Facial droop    Acute according to family per note; rash on trunk and thighs. No longer present  Neurology following  MRI- negative. On ASA. Holding off statin given strict NPO status  Alert and oriented  RPR neg, sed rate and crp high.         Anemia, chronic disease    No overt signs of bleeding / Anemia studies liekly 2/2 renal disease  Anemia studies. B12 and folic acid WNL. Iron in low normal range. Hgb decreased likely due to dilution.         Elevated troponin I level    Likely 2/2 renal - denies chest pain            VTE Risk Mitigation         Ordered      enoxaparin injection 30 mg  Daily     Route:  Subcutaneous        06/11/17 1055     Medium Risk of VTE  Once      06/11/17 0856     Place sequential compression device  Until discontinued      06/11/17 0856     Place ISHMAEL hose  Until discontinued      06/11/17 0856        TPN infusion. GI consult placed for PEG. Appreciate kyara Ortiz MD  Department of Hospital Medicine   [facility]

## 2017-06-15 NOTE — ASSESSMENT & PLAN NOTE
Lives with daughter. Walks with rolling walker. PT/OT consulted. Recommending SNF (needs PEG before she can go)

## 2017-06-15 NOTE — ASSESSMENT & PLAN NOTE
Acute according to family per note; rash on trunk and thighs. No longer present  Neurology following  MRI- negative. On ASA. Holding off statin given strict NPO status  Alert and oriented  RPR neg, sed rate and crp high.

## 2017-06-15 NOTE — ASSESSMENT & PLAN NOTE
NG tube removed today as obstruction had resolved. However, patient is to remain NPO due to severe dysphagia. I discussed replacing NG tube for tube feeds but patient declined and preferred TPN instead.

## 2017-06-15 NOTE — PLAN OF CARE
Problem: Occupational Therapy Goal  Goal: Occupational Therapy Goal  Goals to be met by: 6/26/17    Patient will increase functional independence with ADLs by performing:    Feeding with to be assessed as able.  UE Dressing with Minimal Assistance.  LE Dressing with Moderate Assistance.  Grooming while seated with Set-up Assistance and Stand-by Assistance.  Toileting from bedside commode with Stand-by Assistance for hygiene and clothing management.   Supine to sit with Minimal Assistance.  Stand pivot transfers with Minimal Assistance.  Toilet transfer to bedside commode with Minimal Assistance.  Upper extremity exercise program x10 reps per handout, with assistance as needed.     Outcome: Ongoing (interventions implemented as appropriate)  Patient tolerated tx well for EOB sit 13 mins, improved mobility, but limited endurance, difficulty with nose breathing, and poor oral motor control noted during grooming tasks.   OT to cont POC daily.

## 2017-06-15 NOTE — ASSESSMENT & PLAN NOTE
Asymptomatic  Doubt ACS  Likely d/t renal failure  No plan for inpatient isch eval at this juncture, can consider as outpatient if pt deemed to be a revasc candidate (not clear she is a revasc candidate considering dementia, age, CRI, etc.)

## 2017-06-15 NOTE — ASSESSMENT & PLAN NOTE
Continue TPN and strict I/Os. Is on ASA rectally for cerebrovascular disease. No acute stroke. Renal function improving. Appreciate further nephro recs. Cheung removed today

## 2017-06-15 NOTE — PROGRESS NOTES
Assessment unchanged from am. Remains alert. Oriented w slurred speech. Has worked with OT, PT today. SOB w minimal activity. Daughter at bedside. Denies pain.

## 2017-06-15 NOTE — PROGRESS NOTES
TN received consult for SNF. Facesheet, Orders, H&P, med list, labs, CXR, PT, ST & OT eval sent via [facility] to: [facility] [place], [facility], [place], [facility], [place], and [place]. Awaiting confirmation.

## 2017-06-15 NOTE — PLAN OF CARE
Problem: Physical Therapy Goal  Goal: Physical Therapy Goal  Goals to be met by: 17    Patient will increase functional independence with mobility by performin. Supine to sit with supervision  2. Sit to stand transfer with Supervision  3. Bed to chair transfer assess when able  4. Gait assess when able  5. Sitting at edge of bed x30 minutes with Supervision  6. Lower extremity exercise program x30 reps per handout, with supervision     Outcome: Ongoing (interventions implemented as appropriate)    Patient tolerated 1 standing trial today during PT session.

## 2017-06-15 NOTE — ASSESSMENT & PLAN NOTE
LVEF of 25% and severe aortic stenosis. Medical management at this time. No chest pain or shortness of breath. Cannot give statin as she is strictly NPO. ON ASA rectally. Will add carvedilol when able to take PO. Cannot start ACE-I or ARB due to renal dysfunction. Considering ischemic eval as outpatient. BP at goal. IVFs stopped. On TPN. Appreciate further cards recs

## 2017-06-15 NOTE — PT/OT/SLP PROGRESS
Physical Therapy  Treatment    Seema Schreiber   MRN: [id number]   Admitting Diagnosis: Small bowel obstruction    PT Received On: 06/15/17  PT Start Time: 1604     PT Stop Time: 1627    PT Total Time (min): 23 min       Billable Minutes:  Therapeutic Activity 13  and Therapeutic Exercise 10     Treatment Type: Treatment  PT/PTA: PT     PTA Visit Number: 0       General Precautions: Standard, aspiration, NPO, fall  Orthopedic Precautions: N/A   Braces: N/A    Subjective:  Communicated with nurse Maia prior to session.  Patient agreeable to participate in PT session.     Pain/Comfort  Pain Rating : other (see comments) (minimal complaint to bilateral feet)  Location - Side : Bilateral  Location : foot  Pain Addressed : Reposition, Distraction, Cessation of Activity, Nurse notified    Objective:   Patient found with: PICC line, oxygen, telemetry    Functional Mobility:  Bed Mobility:   Rolling/Turning to Left: Maximum assistance, With side rail  Scooting/Bridging: Dependent, With assist of 2 (drawsheet)  Supine to Sit: Maximum Assistance  Sit to Supine: Maximum Assistance    Transfers:  Sit <> Stand Assistance: Maximum Assistance (x1 from edge of bed )  Sit <> Stand Assistive Device: Rolling Walker    Gait:   Gait Distance: unable to take steps due to fatigue     Balance:   Static Sit: FAIR+: Able to take MINIMAL challenges from all directions  Dynamic Sit: FAIR: Cannot move trunk without losing balance  Static Stand: 0: Needs MAXIMAL assist to maintain     Therapeutic Activities and Exercises:  Patient performed B LE seated therex while on edge of bed 2x10 for A/P, LAQ, and hip abd/add. Patient needed verbal/tactile cues to perform exercises.      AM-PAC 6 CLICK MOBILITY  How much help from another person does this patient currently need?   1 = Unable, Total/Dependent Assistance  2 = A lot, Maximum/Moderate Assistance  3 = A little, Minimum/Contact Guard/Supervision  4 = None, Modified [place]/Independent    Turning  over in bed (including adjusting bedclothes, sheets and blankets)?: 2  Sitting down on and standing up from a chair with arms (e.g., wheelchair, bedside commode, etc.): 2  Moving from lying on back to sitting on the side of the bed?: 2  Moving to and from a bed to a chair (including a wheelchair)?: 1  Need to walk in hospital room?: 1  Climbing 3-5 steps with a railing?: 1  Total Score: 9    AM-PAC Raw Score CMS G-Code Modifier Level of Impairment Assistance   6 % Total / Unable   7 - 9 CM 80 - 100% Maximal Assist   10 - 14 CL 60 - 80% Moderate Assist   15 - 19 CK 40 - 60% Moderate Assist   20 - 22 CJ 20 - 40% Minimal Assist   23 CI 1-20% SBA / CGA   24 CH 0% Independent/ Mod I     Patient left supine with all lines intact, call button in reach, nurse notified and daughter  present.    Assessment:  Seema Schreiber is a 86 y.o. female with a medical diagnosis of Small bowel obstruction and presents with decreased strength and impaired balance for functional mobility. She tolerated one standing trial today with max A and rolling walker. She would continue to benefit from PT while in the hospital in order to get back to PLOF.     Rehab identified problem list/impairments: Rehab identified problem list/impairments: weakness, impaired endurance, impaired functional mobilty, gait instability, impaired balance, impaired self care skills, decreased upper extremity function, decreased lower extremity function, decreased safety awareness, pain, impaired cognition, impaired cardiopulmonary response to activity, decreased coordination    Rehab potential is good.    Activity tolerance: Fair    Discharge recommendations: Discharge Facility/Level Of Care Needs: nursing facility, skilled     Barriers to discharge: Barriers to Discharge: Decreased caregiver support (unable to ambulate at this time )    Equipment recommendations: Equipment Needed After Discharge:  (TBD )     GOALS:    Physical Therapy Goals        Problem:  Physical Therapy Goal    Goal Priority Disciplines Outcome Goal Variances Interventions   Physical Therapy Goal     PT/OT, PT Ongoing (interventions implemented as appropriate)     Description:  Goals to be met by: 17    Patient will increase functional independence with mobility by performin. Supine to sit with supervision  2. Sit to stand transfer with Supervision  3. Bed to chair transfer assess when able  4. Gait assess when able  5. Sitting at edge of bed x30 minutes with Supervision  6. Lower extremity exercise program x30 reps per handout, with supervision                    PLAN:    Patient to be seen 6 x/week  to address the above listed problems via gait training, therapeutic activities, therapeutic exercises  Plan of Care expires: 17  Plan of Care reviewed with: patient, daughter    Abigail QUIROZ Al, PT, MOT  06/15/2017

## 2017-06-16 PROBLEM — R00.0 SINUS TACHYCARDIA BY ELECTROCARDIOGRAM: Status: ACTIVE | Noted: 2017-01-01

## 2017-06-16 NOTE — NURSING
0830- assessment completed and plan of care reviewed with the patient . She is oriented x 4 and her speech is slurred but she will repeat responses when asked and can be easily understood if asked to speak slow. Triple lumen catheter to right groin site clean and dry no signs of inflammation.

## 2017-06-16 NOTE — PLAN OF CARE
Problem: SLP Goal  Goal: SLP Goal  Short Term Goals: (updated)  1. Pt will tolerate puree diet and thin liquids with no overt s/s of aspiration noted.   2. Pt will demonstrate/verbalize 3 safe swallowing precautions with min cues.   3. Pt will tolerate dental soft/regualr trials with dentures in place for possible diet upgrade pending improved bolus manipulation.   4. Pt will complete oral motor exercises x10 each to improve overall oral motor strength and coordination.     Short term goals: (Previous)  1) assess po intake when able  2) oral motor exercises   Outcome: Ongoing (interventions implemented as appropriate)  Swallowing reassessment completed. Improved ability to elicit a swallow this date. Pt presented with overt coughing s/p thin liquid straw sips and initial puree trial, however, no further s/s of aspiration noted (coughing suspected to be 2/2 dried oral secretions on roof of mouth loosening with po trials). Recommend: Pureed diet, thin liquids, STRICT PRECAUTIONS: 1:1 assistance with meals, feed s/p excellent oral care, No straws, small bites and sips, upright for all po intake and 20 minutes s/p po intake, monitor for s/s of aspiration and stop feeding if noted coughing, throat clearing, wet vocal quality, watery eyes. ST will continue to follow.  SIMÓN Edge., CCC-SLP  06/16/2017

## 2017-06-16 NOTE — ASSESSMENT & PLAN NOTE
LVEF of 25% and severe aortic stenosis. Medical management at this time. No chest pain or shortness of breath. Hold off on statin until confirmed able to tolerate PO diet well. ON ASA rectally. Will add carvedilol when able to take PO and BP can tolerate. Cannot start ACE-I or ARB due to renal dysfunction, which is improving. Considering ischemic eval as outpatient. BP relatively low. Bolus 500 cc now. On TPN. Appreciate further cards recs

## 2017-06-16 NOTE — PROGRESS NOTES
TN received a call from [facility]; spoke with Brijesh who stated they are reviewing. Requested pt's daughter number to contact for a visit. Will contact [place] with a decision.    1025 Accepted by [facility]; Enriquer. to visit facility today.

## 2017-06-16 NOTE — PT/OT/SLP PROGRESS
Speech Language Pathology  Dysphagia Treatment    Seema Schreiber   MRN: [id number]   [id number] B    Admitting Diagnosis: Small bowel obstruction    Diet recommendations: Solid Diet Level: Puree  Liquid Diet Level: Thin (NO STRAWS) Crushed po medications in puree  · STRICT PRECAUTIONS:    · 1:1 assistance with meals (pt can feed self, however, requires cues to take small single sips and bites),   · feed s/p excellent oral care,   · No straws,   · small bites and sips, (1/2 tsp bites)  · upright for all po intake and 20 minutes s/p po intake,   · Pt may require cues to swallow- ensure pt swallows before giving another bite/sip  · monitor for s/s of aspiration and stop feeding if noted coughing, throat clearing, wet vocal quality, watery eyes    SLP Treatment Date: 06/16/17  Speech Start Time: 1124     Speech Stop Time: 1200     Speech Total (min): 36 min       TREATMENT BILLABLE MINUTES:  Treatment Swallowing Dysfunction 36    Has the patient been evaluated by SLP for swallowing? : Yes  Keep patient NPO?: No   General Precautions: Standard, aspiration, fall, pureed diet  Current Respiratory Status: nasal cannula       Subjective:  Pt awake and cooperative. HOB elevated and pt repositioned by RN. Pt's daughters present in room. Per family, pt was on a regular diet and thin liquids prior to hospitalization.     Pain/Comfort  Pain Rating 1: 0/10    Objective:     Swallowing reassessment completed. Improved ability to elicit a swallow this date. Oral care provided prior to po trials, however, after first few initial sips and noted coughing s/p initial puree trial, pt with noted dried secretions in oral cavity. Oral care provided a second time and large dried up secretions removed frmo oral cavity. No further s/s of aspiration noted with further po trials. Pt assessed with ice chips x3, tsp sip of water x2, self regulated single small cup sips x3, tsp and 1/2 tsp bites of apple sauce x6. Pt presented with overt coughing s/p  thin liquid straw sips and initial puree trial, however, no further s/s of aspiration noted (coughing suspected to be 2/2 dried oral secretions).     Recommend: Pureed diet, thin liquids, STRICT PRECAUTIONS: 1:1 assistance with meals, feed s/p excellent oral care, No straws, small bites and sips, upright for all po intake and 20 minutes s/p po intake, monitor for s/s of aspiration and stop feeding if noted coughing, throat clearing, wet vocal quality, watery eyes. ST will continue to follow.    SLP provided extensive family education (2 daughters present) re: safe swallowing precautions, importance of oral care, s/s and risks of aspiration, POC, recommendations. SLP recommended They verbalized understanding of all discussed.     Recommendations communicated with Dr. Ortiz.    Assessment:  Seema Schreiber is a 86 y.o. female with a medical diagnosis of Small bowel obstruction and presents with Dysphagia. Pt presents with improved ability to elicit a swallow this date. Improved tolerance of pureed consistencies and thin liquids. ST will continue to follow to assess diet tolerance and for an ongoign assessment to determine least restrictive and safest po diet.     Discharge recommendations: Discharge Facility/Level Of Care Needs: nursing facility, skilled     Goals:    SLP Goals        Problem: SLP Goal    Goal Priority Disciplines Outcome   SLP Goal     SLP Ongoing (interventions implemented as appropriate)   Description:  Short Term Goals: (updated)  1. Pt will tolerate puree diet and thin liquids with no overt s/s of aspiration noted.   2. Pt will demonstrate/verbalize 3 safe swallowing precautions with min cues.   3. Pt will tolerate dental soft/regualr trials with dentures in place for possible diet upgrade pending improved bolus manipulation.   4. Pt will complete oral motor exercises x10 each to improve overall oral motor strength and coordination.     Short term goals: (Previous)  1) assess po intake when able  2)  oral motor exercises                      Plan:   Patient to be seen Therapy Frequency: 3 x/week   Plan of Care expires: 07/06/17  Plan of Care reviewed with: patient, daughter  SLP Follow-up?: Yes  SLP - Next Visit Date: 06/15/17           GEE Mayer, CCC-SLP  06/16/2017

## 2017-06-16 NOTE — PROGRESS NOTES
"[facility]  Progress Note    Patient Name: Seema Schreiber  MRN: [id number]  Patient Class: IP- Inpatient   Admission Date: 6/11/2017  Length of Stay: 5 days  Attending Physician: Eri Alfred MD  Primary Care Provider: Dajuan Guthrie MD        Subjective:     Principal Problem:Small bowel obstruction    HPI:  Seema Schreiber is a 86 y.o. AAF with history of MI, COPD? And CHF? who presented to [facility] from [facility]. According to documentation she was transferred for evaluation of confirmed stroke and small bowel obstruction as seen on CT at [facility]. Per EMS, pt had L-sided facial drooping and altered mental status.     Daughters initially brought pt to [place] for increased confusion (x1 day), decreased appetite (x4 days), and weakness. Pt is normally able to ambulate by herself but has been unable to these past few days. Daughters deny emesis.   Patient is awake and alert at the time of interview, denies constipation or emesis states, "I keep harking like I want to throw up". She reports last BM as yesterday, says it was formed and normal. She denies any chronic pain issues or chronic use of narcotics. Of note patient has well distributed macular papular rash on BL thighs and trunk. States her PCP told her that she had the mumps about 1 month ago, she tells me, "they said they can't do nothing about it".    Patient's labs reveal hypernatremia, dehydration with acute on chronic renal failure III. Her physical assessment is negative for acute bowel but bowel sounds completely absent. She has NG tube in place and denies NV abdominal pain or diarrhea. There is slight L facial drooping with slight blinking eyelid delay.    Hospital Course:  The patient was admitted to the hospital for eval and treatment of a small bowel obstruction, left facial droop, hypovolemic hypernatremia and pre-renal SAVITA (Cr 4). For an unknown reason, the patient was initially " sent to the ICU but sent out the same day. MRI of her head was negative for any acute change. Per labs, patient also noted to be hypothyroid (TSH 7.4, free T4 0.8) and mild troponemia. 2D Echo revealed severe aortic valve stenosis and LVEF 25%. Per daughter, this is an old issue. Initiated on rectal ASA, howeve unable to start on BB or stating due to SBO at the time. Chest pain free. Troponemia likely demand and underlying renal dysfunction. Surgery, cardiology and nephrology consulted. NG tube placed to suction, empiric cipro/flagyl and initiated on IVFs. Small obstruction and hypernatremia eventually resolved, however severe dysphagia was noted to be an issue and is persistent. MRI of brain without acute stroke, but rather chronic cerebrovascular disease. SAVITA also improved with IVFs (gently infused given Syst HF) NG tube had already been removed by surgery. Patient insisted on not putting NG tube back in despite dysphagia. I suggested starting tube feeds but patient would like to continue with TPN. GI consulted for PEG.       Interval History: fever now more persistent. Subsided once initiated on vancomycin and cipro. BCx with 1/4 bottles with GPC. Has a right groin central line in place with TPN infusing. Is a very hard stick and this is her only access. Dressing needs to be changed but area surrounding catheter is not erythematous nor tender. No discharge either. Patient actually looks non toxic and was able to swallow with Speech today. On strict precautions and family helping. With sinus tach.     Review of Systems   Constitutional: Negative for activity change.   Respiratory: Negative for chest tightness and shortness of breath.    Cardiovascular: Negative for chest pain.   Gastrointestinal: Negative for abdominal pain, diarrhea and vomiting.   Genitourinary: Negative.    Musculoskeletal: Negative.    Neurological: Negative.    Psychiatric/Behavioral: Negative.      Objective:     Vital Signs (Most  Recent):  Temp: 97.9 °F (36.6 °C) (06/16/17 1100)  Pulse: 110 (06/16/17 1522)  Resp: 20 (06/16/17 1522)  BP: (!) 90/50 (06/16/17 1100)  SpO2: 95 % (06/16/17 1522) Vital Signs (24h Range):  Temp:  [97.9 °F (36.6 °C)-102.1 °F (38.9 °C)] 97.9 °F (36.6 °C)  Pulse:  [107-121] 110  Resp:  [16-24] 20  SpO2:  [94 %-99 %] 95 %  BP: ()/(44-70) 90/50     Weight: 66.7 kg (147 lb 0.8 oz)  Body mass index is 25.24 kg/m².    Intake/Output Summary (Last 24 hours) at 06/16/17 1648  Last data filed at 06/16/17 1400   Gross per 24 hour   Intake          3763.44 ml   Output              425 ml   Net          3338.44 ml      Physical Exam   Constitutional: She appears well-developed and well-nourished.   HENT:   Head: Normocephalic.   NG removed   Eyes: Conjunctivae and EOM are normal.   Cardiovascular: Normal rate.    Pulmonary/Chest: Effort normal and breath sounds normal. No respiratory distress. She has no wheezes. She has no rales.   On low flow NC   Abdominal: Soft. Bowel sounds are normal. She exhibits no distension. There is no tenderness. There is no guarding.   Musculoskeletal: Normal range of motion. She exhibits no edema.   Neurological: She is alert.   Skin: Skin is warm and dry.   Central line in place   Psychiatric: She has a normal mood and affect. Her behavior is normal. Judgment and thought content normal.   Vitals reviewed.      Significant Labs: All pertinent labs within the past 24 hours have been reviewed.    Magnesium:    Recent Labs  Lab 06/15/17  0506 06/16/17  0519   MG 1.8 1.7       Significant Imaging: I have reviewed all pertinent imaging results/findings within the past 24 hours.  I have reviewed and interpreted all pertinent imaging results/findings within the past 24 hours.    Assessment/Plan:      * Small bowel obstruction    NG tube removed as obstruction had resolved. She had remained strictly NPO for severe dysphagia. Did not want NG tube placed back in and preferred TPN instead. TPN running. Is  having BMs. Abd is non tender, soft and with normal BS. Appreciate further input from surgery who are following PRN.             Oropharyngeal dysphagia    This is severe. No acute stroke per MRI. Unfortunately NG tube has been removed once SBO appeared to have resolved, and patient does not want it back in. Was actually able to tolerate pureed diet with thin liquids today, which was unexpected. GI evaluated patient and would plan for PEG once fever has resolved, however given patient's progress this might not be necessary. I will update GI. Will continue TPN temporarily and reassess swallow assessment tomorrow and see if I can stop it.           Chronic systolic heart failure    LVEF of 25% and severe aortic stenosis. Medical management at this time. No chest pain or shortness of breath. Hold off on statin until confirmed able to tolerate PO diet well. ON ASA rectally. Will add carvedilol when able to take PO and BP can tolerate. Cannot start ACE-I or ARB due to renal dysfunction, which is improving. Considering ischemic eval as outpatient. BP relatively low. Bolus 500 cc now. On TPN. Appreciate further cards recs          Acute renal failure    Much improved. Is on ASA rectally for cerebrovascular disease. No acute stroke. Appreciate further nephro recs. Cheung removed           Sinus tachycardia by electrocardiogram    Likely due to relative hypotension. IV metoprolol PRN. 500cc bolus.           Low grade fever    Persisted more overnight. Blood cultures obtained with 1/4 bottles with GPC. Has central line in place which is used to infuse TPN. Unfortunately patient is a very hard stick and this is the only access available. Fever appears to have subsided after initiating Vancomycin. Pending level in AM. Rectal tylenol PRN. Will keep this central line for now. Clinically appears non toxic           Acquired hypothyroidism    Changed levothyroxine to IV temporarily given dysphagia          Nonrheumatic aortic valve  stenosis    Severe, per Echo. No acute issues          Cardiomyopathy              Weakness    Lives with daughter. Walks with rolling walker. PT/OT consulted. Recommending SNF           Severe malnutrition    F/u Speech recs. TPN per nutrition recs. Added lipids          Facial droop    Acute according to family per note; rash on trunk and thighs. No longer present  Neurology following  MRI- negative. On ASA. Holding off statin until confirmed PO intake can be continued  Alert and oriented  RPR neg, sed rate and crp high.         Anemia, chronic disease    No overt signs of bleeding / Anemia studies liekly 2/2 renal disease  Anemia studies. B12 and folic acid WNL. Iron in low normal range. Hgb decreased likely due to dilution.         Elevated troponin I level    Likely 2/2 renal - denies chest pain            VTE Risk Mitigation         Ordered     enoxaparin injection 30 mg  Daily     Route:  Subcutaneous        06/11/17 1055     Medium Risk of VTE  Once      06/11/17 0856     Place sequential compression device  Until discontinued      06/11/17 0856     Place ISHMAEL hose  Until discontinued      06/11/17 0856        Continue pureed diet and thin liquids with strict aspiration precaution. Monitor for fever and f/u on Cx. Vanc random in AM. Continue PT/OT. Plan for SNF once able to maintain PO intake and treatment for sepsis under control.     Eri Ortiz MD  Department of Hospital Medicine   [facility] [place]

## 2017-06-16 NOTE — PLAN OF CARE
Problem: Patient Care Overview  Goal: Plan of Care Review  06/16/2017    Recommendations    Recommendation/Intervention: 1) Until PEG placement, recommend Clinimix 4.25/25 @ 65 cc/hr with 250 mL 20% IV Lipids daily, provides 2091 calories, 66 g protein, 1560 cc IV Fluid (GIR = 4.1) 2) S/P PEG Placement, until SAVITA resolves, recommend Novasource Renal @ 35 cc/hr, provides 1680 calories, 76 g protein, 602 cc free water.  3) As SAVITA resolves, recommend patient discharge on Isosource 1.5 @ 50 cc/hr, provides 1800 calories, 82 grams of protein, 917 cc free water. 4) Flushes per MD. Use strict aspiration precautions. Check residuals Q4 hours. Hold if > 250 cc. Use Reglan if needed.   Goals: 1) Patient will tolerate nutrition support 2) Patient will meet >=85% of EEN  Nutrition Goal Status: new  Communication of RD Recs: reviewed with physician    Marylu Guerin, MPH, RD, LDN

## 2017-06-16 NOTE — SUBJECTIVE & OBJECTIVE
Interval History: fever now more persistent. Subsided once initiated on vancomycin and cipro. BCx with 1/4 bottles with GPC. Has a right groin central line in place with TPN infusing. Is a very hard stick and this is her only access. Dressing needs to be changed but area surrounding catheter is not erythematous nor tender. No discharge either. Patient actually looks non toxic and was able to swallow with Speech today. On strict precautions and family helping. With sinus tach.     Review of Systems   Constitutional: Negative for activity change.   Respiratory: Negative for chest tightness and shortness of breath.    Cardiovascular: Negative for chest pain.   Gastrointestinal: Negative for abdominal pain, diarrhea and vomiting.   Genitourinary: Negative.    Musculoskeletal: Negative.    Neurological: Negative.    Psychiatric/Behavioral: Negative.      Objective:     Vital Signs (Most Recent):  Temp: 97.9 °F (36.6 °C) (06/16/17 1100)  Pulse: 110 (06/16/17 1522)  Resp: 20 (06/16/17 1522)  BP: (!) 90/50 (06/16/17 1100)  SpO2: 95 % (06/16/17 1522) Vital Signs (24h Range):  Temp:  [97.9 °F (36.6 °C)-102.1 °F (38.9 °C)] 97.9 °F (36.6 °C)  Pulse:  [107-121] 110  Resp:  [16-24] 20  SpO2:  [94 %-99 %] 95 %  BP: ()/(44-70) 90/50     Weight: 66.7 kg (147 lb 0.8 oz)  Body mass index is 25.24 kg/m².    Intake/Output Summary (Last 24 hours) at 06/16/17 1648  Last data filed at 06/16/17 1400   Gross per 24 hour   Intake          3763.44 ml   Output              425 ml   Net          3338.44 ml      Physical Exam   Constitutional: She appears well-developed and well-nourished.   HENT:   Head: Normocephalic.   NG removed   Eyes: Conjunctivae and EOM are normal.   Cardiovascular: Normal rate.    Pulmonary/Chest: Effort normal and breath sounds normal. No respiratory distress. She has no wheezes. She has no rales.   On low flow NC   Abdominal: Soft. Bowel sounds are normal. She exhibits no distension. There is no tenderness. There  is no guarding.   Musculoskeletal: Normal range of motion. She exhibits no edema.   Neurological: She is alert.   Skin: Skin is warm and dry.   Central line in place   Psychiatric: She has a normal mood and affect. Her behavior is normal. Judgment and thought content normal.   Vitals reviewed.      Significant Labs: All pertinent labs within the past 24 hours have been reviewed.    Magnesium:    Recent Labs  Lab 06/15/17  0506 06/16/17  0519   MG 1.8 1.7       Significant Imaging: I have reviewed all pertinent imaging results/findings within the past 24 hours.  I have reviewed and interpreted all pertinent imaging results/findings within the past 24 hours.

## 2017-06-16 NOTE — PROGRESS NOTES
[facility] [place]  Adult Nutrition  Progress Note    SUMMARY     Recommendations    Recommendation/Intervention: 1) Until PEG placement, recommend Clinimix 4.25/25 @ 65 cc/hr with 250 mL 20% IV Lipids daily, provides 2091 calories, 66 g protein, 1560 cc IV Fluid (GIR = 4.1) 2) S/P PEG Placement, until SAVITA resolves, recommend Novasource Renal @ 35 cc/hr, provides 1680 calories, 76 g protein, 602 cc free water.  3) As SAVITA resolves, recommend patient discharge on Isosource 1.5 @ 50 cc/hr, provides 1800 calories, 82 grams of protein, 917 cc free water. 4) Flushes per MD. Use strict aspiration precautions. Check residuals Q4 hours. Hold if > 250 cc. Use Reglan if needed.   Goals: 1) Patient will tolerate nutrition support 2) Patient will meet >=85% of EEN  Nutrition Goal Status: new  Communication of RD Recs: reviewed with physician    Continuum of Care Plan     D/C Planning:  Patient will receive adequate intake via PEG with tolerance.     Reason for Assessment    Reason for Assessment: RD follow-up  Diagnosis:  (tachycardia, Respiratory distress, ARF, CVA, SBO)  Relevent Medical History: CHF, MI, COPD   General Information Comments: SLP following, recommends NPO for solids/liquids. Parenteral nutrition intiated. PEG placement scheduled.     Nutrition Prescription Ordered    Current Diet Order: NPO     Current Nutrition Support Formula Ordered: Clinimix 4.25/25  Current Nutrition Support Rate Ordered: 80 (ml)  Current Nutrition Support Frequency Ordered: mL/hr    Evaluation of Received Nutrients/Fluid Intake    Parenteral Calories (kcal): 1958  Parenteral Protein (gm): 81.6  Parenteral Fluid (mL): 1920    Energy Calories Required: meeting needs  Protein Required: meeting needs  Fluid Required: other (see comments) (I/O -725)     Tolerance: other (see comments) (GIR = 4.7)     Nutrition Risk Screen     Nutrition Risk Screen: reduced oral intake over the last month    Nutrition/Diet History    Patient Reported  "Diet/Restrictions/Preferences: general     Food Preferences: No cultural, [place]ian or ethnic food preferences.    Factors Affecting Nutritional Intake: altered gastrointestinal function, difficulty/impaired swallowing    Labs/Tests/Procedures/Meds    Diagnostic Test/Procedure Review: reviewed, pertinent  Pertinent Labs Reviewed: reviewed  Pertinent Labs Comments: Troponin 0.046  BMP  Lab Results   Component Value Date     (H) 06/16/2017    K 4.0 06/16/2017     (H) 06/16/2017    CO2 21 (L) 06/16/2017    BUN 30 (H) 06/16/2017    CREATININE 1.1 06/16/2017    CALCIUM 9.1 06/16/2017    ANIONGAP 10 06/16/2017    ESTGFRAFRICA 53 (A) 06/16/2017    EGFRNONAA 46 (A) 06/16/2017     Lab Results   Component Value Date    CALCIUM 9.1 06/16/2017    PHOS 4.2 06/16/2017     Lab Results   Component Value Date    ALBUMIN 2.1 (L) 06/12/2017    ALBUMIN 2.1 (L) 06/12/2017     Pertinent Medications Reviewed: reviewed  Pertinent Medications Comments: IVF    Physical Findings    Overall Physical Appearance: on oxygen therapy  Tubes:  (-)  Oral/Mouth Cavity: no dental appliances present  Skin: intact    Anthropometrics    Temp: (!) 101.1 °F (38.4 °C)     Height: 5' 4" (162.6 cm)  Weight Method: Bed Scale  Weight: 66.7 kg (147 lb 0.8 oz)  Ideal Body Weight (IBW), Female: 120 lb  % Ideal Body Weight, Female (lb): 122.54 lb  BMI (Calculated): 25.3  BMI Grade: 25 - 29.9 - overweight    Weight Change Amount:  (ANNE)  Weight Loss: unintentional     Estimated/Assessed Needs    Weight Used For Calorie Calculations: 66.7 kg (147 lb 0.8 oz)     Energy Need Method: Kcal/kg (1667 - 2001)  RMR ([place]-St. Jeor Equation): 1092  Weight Used For Protein Calculations: 66.7 kg (147 lb 0.8 oz)  Protein Requirements: 60 - 75 g/day  Fluid Need Method: RDA Method, other (see comments) (or per MD)  CHO Requirement:  (Patient non-diabetic)     Assessment and Plan     Nutrition Diagnosis     Nutrition Problem:  Inadequate energy intake  Etiology:  " Difficulty swallowing  Signs/Symptoms:  SLP recommends NPO  Status: improving (TPN infusing)    Monitor and Evaluation    Food and Nutrient Intake: enteral nutrition intake, parenteral nutrition intake  Food and Nutrient Adminstration: enteral and parenteral nutrition administration  Physical Activity and Function: nutrition-related ADLs and IADLs  Anthropometric Measurements: weight, weight change  Biochemical Data, Medical Tests and Procedures: electrolyte and renal panel, gastrointestinal profile, glucose/endocrine profile, lipid profile  Nutrition-Focused Physical Findings: overall appearance    Nutrition Risk    Level of Risk: other (see comments) (F/U 2x weekly)    Nutrition Follow-Up    RD Follow-up?: Yes

## 2017-06-16 NOTE — ASSESSMENT & PLAN NOTE
Much improved. Is on ASA rectally for cerebrovascular disease. No acute stroke. Appreciate further nephro recs. Cheung removed

## 2017-06-16 NOTE — PROGRESS NOTES
Pt's daughter, Enedelia, stated that pt was c/o pain to feet. Removed consuelo hose and states that she noticed some green drainage coming from toes. Washed feet well last night and place sterile gauze in between toes. So far no drainage noted. SCD's in place without teds. Informed on coming nurse to monitor.

## 2017-06-16 NOTE — ASSESSMENT & PLAN NOTE
This is severe. No acute stroke per MRI. Unfortunately NG tube has been removed once SBO appeared to have resolved, and patient does not want it back in. Was actually able to tolerate pureed diet with thin liquids today, which was unexpected. GI evaluated patient and would plan for PEG once fever has resolved, however given patient's progress this might not be necessary. I will update GI. Will continue TPN temporarily and reassess swallow assessment tomorrow and see if I can stop it.

## 2017-06-16 NOTE — PROGRESS NOTES
Renal Progress Note      Date of Admission: 6/11/2017  4:46 AM      ROS:  Unable to obtain due to clinical condition      Vitals:    06/16/17 0849   BP: (!) 105/59   Pulse: (!) 121   Resp: 18   Temp: (!) 101.1 °F (38.4 °C)     I/O last 3 completed shifts:  In: 350 [I.V.:350]  Out: 1175 [Urine:1175]  I/O this shift:  In: 2665.3   Out: -       Constitutional: Elderly female, thin, NAD  HENT: NGT in place  Head: Normocephalic.   Cardiovascular: Normal rate.  S1-S2  Pulmonary: CTA  Abdominal: Soft. She exhibits no distension, there is no tenderness Hypoactive BS   Neurologic: confused    Laboratories:    No results for input(s): WBC, RBC, HGB, HCT, PLT, MCV, MCH, MCHC in the last 24 hours.    Recent Labs  Lab 06/16/17  0519   CALCIUM 9.1   *   K 4.0   CO2 21*   *   BUN 30*   CREATININE 1.1       Phosphorus  5.4  3.6   4.2  Phosphorus     Magnesium               Iron  45   Iron     TIBC   172  TIBC    Saturated Iron   26  Saturated Iron    Transferrin   116  116  Transferrin    Ferritin   182  Ferritin    Folate   17.9  Folate    Vitamin B-12   888  Vitamin      Phosphorus  5.4   Phosphorus     Magnesium   1.6  Magnesium     Microbiology Results (last 7 days)     Procedure Component Value Units Date/Time    Blood culture [[id number]] Collected:  06/16/17 0030    Order Status:  Completed Specimen:  Blood Updated:  06/16/17 0712     Blood Culture, Routine No Growth to date    Blood culture [[id number]] Collected:  06/16/17 0055    Order Status:  Completed Specimen:  Blood Updated:  06/16/17 0712     Blood Culture, Routine No Growth to date    Blood culture [[id number]] Collected:  06/12/17 1852    Order Status:  Completed Specimen:  Blood Updated:  06/15/17 2103     Blood Culture, Routine No Growth to date     Blood Culture, Routine No Growth to date     Blood Culture, Routine No Growth to date     Blood Culture, Routine No Growth to date    Blood culture [[id number]] Collected:   06/13/17 1819    Order Status:  Completed Specimen:  Blood Updated:  06/15/17 1903     Blood Culture, Routine No Growth to date     Blood Culture, Routine No Growth to date     Blood Culture, Routine No Growth to date    Urine culture [[id number]] Collected:  06/11/17 1133    Order Status:  Completed Specimen:  Urine from Urine, Catheterized Updated:  06/13/17 0747     Urine Culture, Routine No growth          Assessment:    85 y/o AAF admitted with:    Small bowel obstruction. - remains NPO -  S/p Acute kidney injury (unknown baseline creatinine) but improving significantly.  Mild hyperNa+  Metabolic acidosis - improving -  Anemia of chronic disease  Hypoalbuminemia severe protein malnutrition  Hx. CVA.  Hx. COPD.    Plan:    - NPO state  - TPN started per Hospitalist  -  Replace K+ and Mg++ as needed  - f/u labs   - watch volume - hydration and acid / base  status while on TPN    .

## 2017-06-16 NOTE — ASSESSMENT & PLAN NOTE
NG tube removed as obstruction had resolved. She had remained strictly NPO for severe dysphagia. Did not want NG tube placed back in and preferred TPN instead. TPN running. Is having BMs. Abd is non tender, soft and with normal BS. Appreciate further input from surgery who are following PRN.

## 2017-06-16 NOTE — PROGRESS NOTES
T 100.9 Tylenol supp given. No other change in condition from early assessment. Remains SOB w activity. Coarse crackles auscultated . Oral suctioned thick yellow mucus. Family at bedside.

## 2017-06-16 NOTE — NURSING
0715- bedside rounding report received from ayleen patel rn on patients progress and updated hand off report sheet given too. Patient wearing scd's and unable to tolerate ISHMAEL hose due to pain in lower legs and feet. NPO status remains in effect.

## 2017-06-16 NOTE — ASSESSMENT & PLAN NOTE
Acute according to family per note; rash on trunk and thighs. No longer present  Neurology following  MRI- negative. On ASA. Holding off statin until confirmed PO intake can be continued  Alert and oriented  RPR neg, sed rate and crp high.

## 2017-06-16 NOTE — ASSESSMENT & PLAN NOTE
Persisted more overnight. Blood cultures obtained with 1/4 bottles with GPC. Has central line in place which is used to infuse TPN. Unfortunately patient is a very hard stick and this is the only access available. Fever appears to have subsided after initiating Vancomycin. Pending level in AM. Rectal tylenol PRN. Will keep this central line for now. Clinically appears non toxic

## 2017-06-16 NOTE — PT/OT/SLP PROGRESS
"Occupational Therapy  Treatment    Seema Schreiber   MRN: [id number]   Admitting Diagnosis: Small bowel obstruction    OT Date of Treatment: 06/16/17   OT Start Time: 1544  OT Stop Time: 1608  OT Total Time (min): 24 min    Billable Minutes:  Therapeutic Activity 24    General Precautions: Standard, aspiration, fall, pureed diet  Orthopedic Precautions: N/A  Braces: N/A    Do you have any cultural, spiritual, Islam conflicts, given your current situation?: no    Subjective:  Communicated with nurseRosa prior to session.    Pain/Comfort  Pain Rating 1: 0/10    Objective:  Patient found with: oxygen, telemetry, pressure relief boots, SCD (PICC TLC right groin)     Functional Mobility:  Bed Mobility:  Rolling/Turning to Left: Moderate assistance  Rolling/Turning Right: Moderate assistance  Scooting/Bridging: With assist of 2, Dependent (drawsheet lift to the top of the bed)  Supine to Sit: Maximum Assistance  Sit to Supine: Dependent    Activities of Daily Living:  LE Dressing Level of Assistance: Total assistance (socks)    Balance:   Static Sit: FAIR-: Maintains without assist but inconsistent   The patient tolerated sitting EOB with SBA ~5 min. The patient c/o dizziness and requested to return to supine.     AM-PAC 6 CLICK ADL   How much help from another person does this patient currently need?   1 = Unable, Total/Dependent Assistance  2 = A lot, Maximum/Moderate Assistance  3 = A little, Minimum/Contact Guard/Supervision  4 = None, Modified [place]/Independent    Putting on and taking off regular lower body clothing? : 1  Bathing (including washing, rinsing, drying)?: 2  Toileting, which includes using toilet, bedpan, or urinal? : 1  Putting on and taking off regular upper body clothing?: 2  Taking care of personal grooming such as brushing teeth?: 3  Eating meals?: 2  Total Score: 11     AM-PAC Raw Score CMS "G-Code Modifier Level of Impairment Assistance   6 % Total / Unable   7 - 8 CM 80 - 100% " Maximal Assist   9-13 CL 60 - 80% Moderate Assist   14 - 19 CK 40 - 60% Moderate Assist   20 - 22 CJ 20 - 40% Minimal Assist   23 CI 1-20% SBA / CGA   24 CH 0% Independent/ Mod I       Patient left right sidelying with all lines intact, call button in reach, nurse, Rosa notified and family present    ASSESSMENT:  OT treatment was limited by the patient c/o dizziness while seated EOB.    Rehab identified problem list/impairments: Rehab identified problem list/impairments: weakness, impaired endurance, impaired self care skills, impaired balance, gait instability, impaired functional mobilty, decreased upper extremity function, decreased lower extremity function, pain, decreased safety awareness, impaired cardiopulmonary response to activity    Rehab potential is fair.    Activity tolerance: Fair    Discharge recommendations: Discharge Facility/Level Of Care Needs: nursing facility, skilled     Barriers to discharge: Barriers to Discharge: Decreased caregiver support    Equipment recommendations: none     GOALS:    Occupational Therapy Goals        Problem: Occupational Therapy Goal    Goal Priority Disciplines Outcome Interventions   Occupational Therapy Goal     OT, PT/OT Ongoing (interventions implemented as appropriate)    Description:  Goals to be met by: 6/26/17    Patient will increase functional independence with ADLs by performing:    Feeding with to be assessed as able.  UE Dressing with Minimal Assistance.  LE Dressing with Moderate Assistance.  Grooming while seated with Set-up Assistance and Stand-by Assistance.  Toileting from bedside commode with Stand-by Assistance for hygiene and clothing management.   Supine to sit with Minimal Assistance.  Stand pivot transfers with Minimal Assistance.  Toilet transfer to bedside commode with Minimal Assistance.  Upper extremity exercise program x10 reps per handout, with assistance as needed.                      Plan:  Patient to be seen 5 x/week to address the  above listed problems via self-care/home management, therapeutic activities, therapeutic exercises  Plan of Care expires: 06/26/17  Plan of Care reviewed with: patient, daughter         Casandra Rodarteean, OT  06/16/2017

## 2017-06-16 NOTE — CONSULTS
"Gastroenterology    CC: Dysphagia    HPI 86 y.o. female with severe, persistent, painless, oropharyngeal dysphagia.  Admitted with SBO, which is now resolved.  + fever overnight.  No N/V.  No overt GI bleeding.  No jaundice.  No recent travel or sick contacts.      Past Medical History:   Diagnosis Date    CHF (congestive heart failure)     COPD (chronic obstructive pulmonary disease)     MI, acute, non ST segment elevation          No past surgical history on file.    Social History  Social History   Substance Use Topics    Smoking status: Unknown If Ever Smoked    Smokeless tobacco: Not on file    Alcohol use Not on file   No illicits      No family history on file.    Review of Systems  General ROS: negative for chills, + fever  ENT ROS: negative for epistaxis, sore throat or rhinorrhea  Cardiovascular ROS: no chest pain or dyspnea   Gastrointestinal ROS: no abdominal pain, change in bowel habits, or black/ bloody stools    Physical Examination  /70   Pulse (!) 114   Temp 100.1 °F (37.8 °C) (Oral)   Resp 20   Ht 5' 4" (1.626 m)   Wt 66.7 kg (147 lb)   LMP  (LMP Unknown)   SpO2 96%   Breastfeeding? No   BMI 25.23 kg/m²   General appearance: alert, cooperative, no distress  HENT: Normocephalic, atraumatic, neck symmetrical, no nasal discharge   Eyes: conjunctivae/corneas clear, PERRL, EOM's intact  Lungs: clear to auscultation bilaterally, no dullness to percussion bilaterally  Heart: regular rate and rhythm without rub; no displacement of the PMI   Abdomen: soft, NT, ND  Extremities: extremities symmetric; no clubbing, cyanosis  Integument: Skin color, texture, turgor normal; no rashes; hair distrubution normal    Assessment:     SBO - resolved.  Dysphagia - ST assessed and recommended NPO with PEG.      Plan:   - Fever overnight.  Reji defer PEG until possible infectious issues resolved and afebrile for 24 hrs.  Will check back Sunday.         "

## 2017-06-16 NOTE — PROGRESS NOTES
Notified Dr. Ferreira of spike in temp. Blood cultures were drawn June 12 th and shows no growth to date. Tylenol given and bath. Ordered a repeat of blood cultures to be drawn. Orders initiated.

## 2017-06-17 PROBLEM — A41.89 GRAM POSITIVE SEPSIS: Status: ACTIVE | Noted: 2017-01-01

## 2017-06-17 NOTE — PT/OT/SLP PROGRESS
"Physical Therapy  Treatment    Seema Schreiber   MRN: [id number]   Admitting Diagnosis: Small bowel obstruction    PT Received On: 06/17/17  PT Start Time: 1249     PT Stop Time: 1317    PT Total Time (min): 28 min       Billable Minutes:   Therapeutic Activity 28    Treatment Type: Treatment  PT/PTA: PTA     PTA Visit Number: 1       General Precautions: Standard, fall, aspiration .   Orthopedic Precautions: N/A   Braces: N/A         Subjective:  Communicated with nurse Lee prior to session.  Pt agreeable to therapy. " I need to get clean up "     Pain/Comfort  Pain Rating 1: 0/10  Pain Rating Post-Intervention 1: 0/10    Objective:   Patient found with: telemetry, pressure relief boots, oxygen, central line, FCD    Functional Mobility:  Bed Mobility:   Rolling/Turning to Left: Moderate assistance, Minimum assistance  Rolling/Turning Right: Moderate assistance, Minimum assistance  Scooting/Bridging: Maximum Assistance  Supine to Sit: Maximum Assistance  Sit to Supine: Moderate Assistance, With leg lift    Transfers: x 3 trials from bed. VC's for safety technique and walker management .  Sit <> Stand Assistance: Maximum Assistance  Sit <> Stand Assistive Device: Rolling Walker    Balance:   Static Sit: FAIR: Maintains without assist, but unable to take any challenges   Dynamic Sit: FAIR: Cannot move trunk without losing balance  Static Stand: POOR: Needs MODERATE assist to maintain  Dynamic stand: POOR: N/A     Therapeutic Activities and Exercises:  Pt performed bed mobility, transfer  as above.   Pt  performed 4-5 side steps HOB  in order to scoot back to bed. Using RW MAX A .   Pt able to maintain standing balance using  RW Mod A x ~ 1 min x 3 trials to get carolyne-care and bed linens change  per PCT.   Pt performed seated BLE x 5 reps : LAQ, HS. Encouraged pt to performed BLE  AP while in bed.   AM-PAC 6 CLICK MOBILITY  How much help from another person does this patient currently need?   1 = Unable, Total/Dependent " Assistance  2 = A lot, Maximum/Moderate Assistance  3 = A little, Minimum/Contact Guard/Supervision  4 = None, Modified [place]/Independent    Turning over in bed (including adjusting bedclothes, sheets and blankets)?: 3  Sitting down on and standing up from a chair with arms (e.g., wheelchair, bedside commode, etc.): 2  Moving from lying on back to sitting on the side of the bed?: 2  Moving to and from a bed to a chair (including a wheelchair)?: 2  Need to walk in hospital room?: 2  Climbing 3-5 steps with a railing?: 1  Total Score: 12    AM-PAC Raw Score CMS G-Code Modifier Level of Impairment Assistance   6 % Total / Unable   7 - 9 CM 80 - 100% Maximal Assist   10 - 14 CL 60 - 80% Moderate Assist   15 - 19 CK 40 - 60% Moderate Assist   20 - 22 CJ 20 - 40% Minimal Assist   23 CI 1-20% SBA / CGA   24 CH 0% Independent/ Mod I     Patient left with bed in chair position FCD and pressure relief boots palced with all lines intact, call button in reach and nurse Rosa notified.    Assessment:  Seema Schreiber is a 86 y.o. female with a medical diagnosis of Small bowel obstruction and presents with weakness, decreased endurance and functional mobility. Pt tolerated treatment well today. pt will benefit from further skilled therapy in order to get back to PLOF .    Rehab identified problem list/impairments: Rehab identified problem list/impairments: weakness, impaired endurance, decreased lower extremity function, decreased upper extremity function, decreased ROM, gait instability, decreased coordination, impaired cardiopulmonary response to activity, impaired functional mobilty    Rehab potential is good.    Activity tolerance: Good    Discharge recommendations: Discharge Facility/Level Of Care Needs: nursing facility, skilled     Barriers to discharge:  Decreased caregiver support.     Equipment recommendations:   TBD     GOALS:    Physical Therapy Goals        Problem: Physical Therapy Goal    Goal Priority  Disciplines Outcome Goal Variances Interventions   Physical Therapy Goal     PT/OT, PT Ongoing (interventions implemented as appropriate)     Description:  Goals to be met by: 17    Patient will increase functional independence with mobility by performin. Supine to sit with supervision  2. Sit to stand transfer with Supervision  3. Bed to chair transfer assess when able  4. Gait assess when able  5. Sitting at edge of bed x30 minutes with Supervision  6. Lower extremity exercise program x30 reps per handout, with supervision                      PLAN:    Patient to be seen 6 x/week  to address the above listed problems via gait training, therapeutic activities, therapeutic exercises  Plan of Care expires: 17  Plan of Care reviewed with: patient, daughter         Anais Knight, PTA  2017

## 2017-06-17 NOTE — PT/OT/SLP PROGRESS
Physical Therapy      Seema Schreiber  MRN: [id number]    Patient not seen today secondary to Toileting ( Pt is using the bed pan ) . Will follow-up as able today.    Anais Knight, PTA

## 2017-06-17 NOTE — ASSESSMENT & PLAN NOTE
Persisted more overnight. Blood cultures obtained with 2/4 bottles with GPC resembling Staph. I personally placed a left EJ (18G). Will remove central line now. Culture tip. Stop cipro and continue Vancomycin. Pending random level and BMP. 2D Echo ordered to r/o endocarditis. Repeat BCx in AM.

## 2017-06-17 NOTE — NURSING
· Notified Dr. Ferreira of pts increased MEWS score of 6.  · Dr. Ferreira stated he is on his way to see the pt and do an assessment.

## 2017-06-17 NOTE — PLAN OF CARE
Problem: Physical Therapy Goal  Goal: Physical Therapy Goal  Goals to be met by: 17    Patient will increase functional independence with mobility by performin. Supine to sit with supervision  2. Sit to stand transfer with Supervision  3. Bed to chair transfer assess when able  4. Gait assess when able  5. Sitting at edge of bed x30 minutes with Supervision  6. Lower extremity exercise program x30 reps per handout, with supervision     Outcome: Ongoing (interventions implemented as appropriate)  Pt will benefit from further skilled therapy in order to get back to PLOF.

## 2017-06-17 NOTE — PLAN OF CARE
Problem: Sepsis/Septic Shock (Adult)  Goal: Signs and Symptoms of Listed Potential Problems Will be Absent, Minimized or Managed (Sepsis/Septic Shock)  Signs and symptoms of listed potential problems will be absent, minimized or managed by discharge/transition of care (reference Sepsis/Septic Shock (Adult) CPG).  Outcome: Ongoing (interventions implemented as appropriate)   06/17/17 0608   Sepsis/Septic Shock   Problems Assessed (Sepsis) all   Problems Present (Sepsis) hypoperfusion/hemodynamic instability;infection progression;situational response;undernutrition

## 2017-06-17 NOTE — NURSING
1910- bedside rounding report given to niko dumont rn on patients progress and updated hand off report sheet given. No acute distress no sign of aspiration . Head of bed elevated.. No change on telemetry . Right groin central line clean dry and intact no signs of inflammation.

## 2017-06-17 NOTE — ASSESSMENT & PLAN NOTE
LVEF of 25% and severe aortic stenosis. Medical management at this time. No chest pain or shortness of breath. I will start statin and change ASA to PO (all meds crushed with puree). Hold off on carvedilol and ACE-I as patient is septic. Considering ischemic eval as outpatient. BP relatively low, but better. Bolus 500 cc now. Will stop TPN as I will remove central line. Appreciate further cards recs

## 2017-06-17 NOTE — SUBJECTIVE & OBJECTIVE
Interval History: although not as persistent as yesterday, fever is less often after initiating vancomycin. Unfortunately blood cultures are coming back positive for Staph. BP and sinus tach improved with 1L bolus given last night. Patient is in no respiratory distress and feels well. Has non toxic appearance    Review of Systems   Constitutional: Negative for activity change.   Respiratory: Negative for chest tightness and shortness of breath.    Cardiovascular: Negative for chest pain.   Gastrointestinal: Negative for abdominal pain, diarrhea and vomiting.   Genitourinary: Negative.    Musculoskeletal: Negative.    Neurological: Negative.    Psychiatric/Behavioral: Negative.      Objective:     Vital Signs (Most Recent):  Temp: 100 °F (37.8 °C) (06/17/17 0540)  Pulse: 105 (06/17/17 0540)  Resp: 20 (06/17/17 0540)  BP: 110/65 (06/17/17 0300)  SpO2: 100 % (06/17/17 0540) Vital Signs (24h Range):  Temp:  [97.6 °F (36.4 °C)-101.5 °F (38.6 °C)] 100 °F (37.8 °C)  Pulse:  [] 105  Resp:  [17-24] 20  SpO2:  [94 %-100 %] 100 %  BP: ()/(49-65) 110/65     Weight: 66.7 kg (147 lb 0.8 oz)  Body mass index is 25.24 kg/m².    Intake/Output Summary (Last 24 hours) at 06/17/17 0651  Last data filed at 06/17/17 0600   Gross per 24 hour   Intake          6430.81 ml   Output              850 ml   Net          5580.81 ml      Physical Exam   Constitutional: She appears well-developed and well-nourished.   HENT:   Head: Normocephalic.   NG removed   Eyes: Conjunctivae and EOM are normal.   Cardiovascular: Normal rate.    Pulmonary/Chest: Effort normal and breath sounds normal. No respiratory distress. She has no wheezes. She has no rales.   On low flow NC   Abdominal: Soft. Bowel sounds are normal. She exhibits no distension. There is no tenderness. There is no guarding.   Musculoskeletal: Normal range of motion. She exhibits no edema.   Neurological: She is alert.   Skin: Skin is warm and dry.   Central line in place    Psychiatric: She has a normal mood and affect. Her behavior is normal. Judgment and thought content normal.   Vitals reviewed.      Significant Labs: All pertinent labs within the past 24 hours have been reviewed.    Magnesium:    Recent Labs  Lab 06/16/17  0519   MG 1.7       Significant Imaging: I have reviewed all pertinent imaging results/findings within the past 24 hours.  I have reviewed and interpreted all pertinent imaging results/findings within the past 24 hours.

## 2017-06-17 NOTE — NURSING
0700- bedside rounding report received from niko dumont rn on patients progress and updated hand off report sheet given too. New orders per dr rivers to remove central line and consult anesthesia for new central line. Patient spike temperature during night hours medicated for fever will monitor. Fluid bolus going at this time.

## 2017-06-17 NOTE — NURSING
· Pt running a temperature of 101F.  · Tylenol suppository given. Will continue to monitor for fever.  · Spoke to Dr. Ortiz regarding MEWS score of 5 now.  · Fluid bolus ordered and is administering now.   · Vancomycin ordered and will administer once received from pharmacy.  · Dr. Ortiz does not want Metoprolol administered at this time for elevated heart rate.

## 2017-06-17 NOTE — NURSING
Fever and heart rate still poorly controlled. Lowest temperature has been 99 after administration of Motrin which Dr. Ferreira ordered. He had nothing further to add to the case and cautioned against using Metoprolol for her heart rate in lieu of current hypotension.

## 2017-06-17 NOTE — ASSESSMENT & PLAN NOTE
Acute according to family per note; rash on trunk and thighs. No longer present  Neurology following  MRI- negative. On ASA. Start statin today  Alert and oriented  RPR neg, sed rate and crp high.

## 2017-06-17 NOTE — PROGRESS NOTES
"[facility] [place] [facility]  Progress Note    Patient Name: Seema Schreiber  MRN: [id number]  Patient Class: IP- Inpatient   Admission Date: 6/11/2017  Length of Stay: 6 days  Attending Physician: Eri Alfred MD  Primary Care Provider: Dajuan Guthrie MD        Subjective:     Principal Problem:Small bowel obstruction    HPI:  Seema Schreiber is a 86 y.o. AAF with history of MI, COPD? And CHF? who presented to [facility] from [facility]. According to documentation she was transferred for evaluation of confirmed stroke and small bowel obstruction as seen on CT at [facility]. Per EMS, pt had L-sided facial drooping and altered mental status.     Daughters initially brought pt to [place] for increased confusion (x1 day), decreased appetite (x4 days), and weakness. Pt is normally able to ambulate by herself but has been unable to these past few days. Daughters deny emesis.   Patient is awake and alert at the time of interview, denies constipation or emesis states, "I keep harking like I want to throw up". She reports last BM as yesterday, says it was formed and normal. She denies any chronic pain issues or chronic use of narcotics. Of note patient has well distributed macular papular rash on BL thighs and trunk. States her PCP told her that she had the mumps about 1 month ago, she tells me, "they said they can't do nothing about it".    Patient's labs reveal hypernatremia, dehydration with acute on chronic renal failure III. Her physical assessment is negative for acute bowel but bowel sounds completely absent. She has NG tube in place and denies NV abdominal pain or diarrhea. There is slight L facial drooping with slight blinking eyelid delay.    Hospital Course:  The patient was admitted to the hospital for eval and treatment of a small bowel obstruction, left facial droop, hypovolemic hypernatremia and pre-renal SAVITA (Cr 4). For an unknown reason, the patient was initially " sent to the ICU but sent out the same day. MRI of her head was negative for any acute change. Per labs, patient also noted to be hypothyroid (TSH 7.4, free T4 0.8) and mild troponemia. 2D Echo revealed severe aortic valve stenosis and LVEF 25%. Per daughter, this is an old issue. Initiated on rectal ASA, howeve unable to start on BB or stating due to SBO at the time. Chest pain free. Troponemia likely demand and underlying renal dysfunction. Surgery, cardiology and nephrology consulted. NG tube placed to suction, empiric cipro/flagyl and initiated on IVFs. Small obstruction and hypernatremia eventually resolved, however severe dysphagia was noted to be an issue and is persistent. MRI of brain without acute stroke, but rather chronic cerebrovascular disease. SAVITA also improved with IVFs (gently infused given Syst HF) NG tube had already been removed by surgery. Patient insisted on not putting NG tube back in despite dysphagia. I suggested starting tube feeds but patient would like to continue with TPN. GI consulted for PEG.       Interval History: although not as persistent as yesterday, fever is less often after initiating vancomycin. Unfortunately blood cultures are coming back positive for Staph. BP and sinus tach improved with 1L bolus given last night. Patient is in no respiratory distress and feels well. Has non toxic appearance    Review of Systems   Constitutional: Negative for activity change.   Respiratory: Negative for chest tightness and shortness of breath.    Cardiovascular: Negative for chest pain.   Gastrointestinal: Negative for abdominal pain, diarrhea and vomiting.   Genitourinary: Negative.    Musculoskeletal: Negative.    Neurological: Negative.    Psychiatric/Behavioral: Negative.      Objective:     Vital Signs (Most Recent):  Temp: 100 °F (37.8 °C) (06/17/17 0540)  Pulse: 105 (06/17/17 0540)  Resp: 20 (06/17/17 0540)  BP: 110/65 (06/17/17 0300)  SpO2: 100 % (06/17/17 0540) Vital Signs (24h  Range):  Temp:  [97.6 °F (36.4 °C)-101.5 °F (38.6 °C)] 100 °F (37.8 °C)  Pulse:  [] 105  Resp:  [17-24] 20  SpO2:  [94 %-100 %] 100 %  BP: ()/(49-65) 110/65     Weight: 66.7 kg (147 lb 0.8 oz)  Body mass index is 25.24 kg/m².    Intake/Output Summary (Last 24 hours) at 06/17/17 0651  Last data filed at 06/17/17 0600   Gross per 24 hour   Intake          6430.81 ml   Output              850 ml   Net          5580.81 ml      Physical Exam   Constitutional: She appears well-developed and well-nourished.   HENT:   Head: Normocephalic.   NG removed   Eyes: Conjunctivae and EOM are normal.   Cardiovascular: Normal rate.    Pulmonary/Chest: Effort normal and breath sounds normal. No respiratory distress. She has no wheezes. She has no rales.   On low flow NC   Abdominal: Soft. Bowel sounds are normal. She exhibits no distension. There is no tenderness. There is no guarding.   Musculoskeletal: Normal range of motion. She exhibits no edema.   Neurological: She is alert.   Skin: Skin is warm and dry.   Central line in place   Psychiatric: She has a normal mood and affect. Her behavior is normal. Judgment and thought content normal.   Vitals reviewed.      Significant Labs: All pertinent labs within the past 24 hours have been reviewed.    Magnesium:    Recent Labs  Lab 06/16/17  0519   MG 1.7       Significant Imaging: I have reviewed all pertinent imaging results/findings within the past 24 hours.  I have reviewed and interpreted all pertinent imaging results/findings within the past 24 hours.    Assessment/Plan:      * Small bowel obstruction    NG tube removed as obstruction had resolved. She had remained strictly NPO for severe dysphagia. Did not want NG tube placed back in and preferred TPN instead. TPN running. Is having BMs. Abd is non tender, soft and with normal BS. Appreciate further input from surgery who are following PRN.             Oropharyngeal dysphagia    This is severe. No acute stroke per MRI.  Unfortunately NG tube has been removed once SBO appeared to have resolved, and patient does not want it back in. Was actually able to tolerate pureed diet with thin liquids, which was unexpected. GI evaluated patient and would plan for PEG once fever has resolved, however given patient's progress this might not be necessary. I updated GI. TPN to be discontinued today as central line must be removed. Encourage PO intake. Continue Pureed diet with thin liquid, strict aspiration precautions. Appreciate further Speech recs           Chronic systolic heart failure    LVEF of 25% and severe aortic stenosis. Medical management at this time. No chest pain or shortness of breath. I will start statin and change ASA to PO (all meds crushed with puree). Hold off on carvedilol and ACE-I as patient is septic. Considering ischemic eval as outpatient. BP relatively low, but better. Bolus 500 cc now. Will stop TPN as I will remove central line. Appreciate further cards recs          Gram positive sepsis    Likely due to central line. BCx growing GPC resembling Staph. Will remove central line now. Continue Vanc. Pending levels and BMP. Repeat BCx in AM.           Acute renal failure    Much improved. No acute stroke. Appreciate further nephro recs. Cheung removed           Sinus tachycardia by electrocardiogram    Likely due to relative hypotension. 500cc bolus.           Low grade fever    Persisted more overnight. Blood cultures obtained with 2/4 bottles with GPC resembling Staph. I personally placed a left EJ (18G). Will remove central line now. Culture tip. Stop cipro and continue Vancomycin. Pending random level and BMP. 2D Echo ordered to r/o endocarditis. Repeat BCx in AM.           Acquired hypothyroidism    Change levothyroxine to PO          Nonrheumatic aortic valve stenosis    Severe, per Echo. No acute issues          Cardiomyopathy              Weakness    Lives with daughter. Walks with rolling walker. PT/OT consulted.  Recommending SNF           Severe malnutrition    F/u Speech recs. Stop TPN. Continued pureed diet          Facial droop    Acute according to family per note; rash on trunk and thighs. No longer present  Neurology following  MRI- negative. On ASA. Start statin today  Alert and oriented  RPR neg, sed rate and crp high.         Anemia, chronic disease    No overt signs of bleeding / Anemia studies liekly 2/2 renal disease  Anemia studies. B12 and folic acid WNL. Iron in low normal range. Hgb decreased likely due to dilution.         Elevated troponin I level    Likely 2/2 renal - denies chest pain            VTE Risk Mitigation         Ordered     enoxaparin injection 30 mg  Daily     Route:  Subcutaneous        06/11/17 1055     Medium Risk of VTE  Once      06/11/17 0856     Place sequential compression device  Until discontinued      06/11/17 0856     Place ISHMAEL hose  Until discontinued      06/11/17 0856        Left EJ in place. Remove central line now. Bolus 500 cc. Stop TPN. Continue vanc (pending levels and BMP). PT/OT. SNF    Eri Ortiz MD  Department of Hospital Medicine   [facility] [place]

## 2017-06-17 NOTE — PROGRESS NOTES
Renal Progress Note      Date of Admission: 6/11/2017  4:46 AM      ROS:  Unable to obtain due to clinical condition      Vitals:    06/17/17 0849   BP: (!) 109/54   Pulse: 95   Resp: 20   Temp: 97.3 °F (36.3 °C)     I/O last 3 completed shifts:  In: 6430.8 [P.O.:240; I.V.:60; IV Piggyback:1700]  Out: 1275 [Urine:1275]  No intake/output data recorded.      Constitutional: Elderly female, thin, NAD  HENT: NGT in place  Head: Normocephalic.   Cardiovascular: Normal rate.  S1-S2  Pulmonary: CTA  Abdominal: Soft. She exhibits no distension, there is no tenderness Hypoactive BS   Neurologic: confused    Laboratories:    No results for input(s): WBC, RBC, HGB, HCT, PLT, MCV, MCH, MCHC in the last 24 hours.    Recent Labs  Lab 06/17/17  0831   CALCIUM 8.4*      K 3.5   CO2 22*   *   BUN 32*   CREATININE 1.1       Phosphorus  5.4 3.6 4.2  Phosphorus     Magnesium               Iron  45   Iron     TIBC   172  TIBC    Saturated Iron   26  Saturated Iron    Transferrin   116  116  Transferrin    Ferritin   182  Ferritin    Folate   17.9  Folate    Vitamin B-12   888  Vitamin      Phosphorus  5.4   Phosphorus     Magnesium   1.6  Magnesium     Microbiology Results (last 7 days)     Procedure Component Value Units Date/Time    Blood culture [[id number]] Collected:  06/16/17 0055    Order Status:  Completed Specimen:  Blood Updated:  06/17/17 0810     Blood Culture, Routine Gram stain aer bottle: Gram positive cocci in clusters resembling Staph     Blood Culture, Routine Previously called 06/16/2017  22:36     Blood Culture, Routine --     STAPHYLOCOCCUS AUREUS  Susceptibility pending      Blood culture [[id number]] Collected:  06/16/17 0030    Order Status:  Completed Specimen:  Blood Updated:  06/17/17 0809     Blood Culture, Routine Gram stain aer bottle: Gram positive cocci in clusters resembling Staph      Blood Culture, Routine Results called to and read back by:Rosa Santillan debbi  06/16/2017  14:11     Blood Culture, Routine --     STAPHYLOCOCCUS AUREUS  Susceptibility pending      IV catheter culture [[id number]] Collected:  06/17/17 0756    Order Status:  Sent Specimen:  Catheter Tip from Catheter Tip, Peripheral Updated:  06/17/17 0805    IV catheter culture [[id number]]     Order Status:  Canceled Specimen:  Catheter Tip from Catheter Tip, Intrajugular     Blood culture [[id number]] Collected:  06/12/17 1852    Order Status:  Completed Specimen:  Blood Updated:  06/16/17 2103     Blood Culture, Routine No Growth to date     Blood Culture, Routine No Growth to date     Blood Culture, Routine No Growth to date     Blood Culture, Routine No Growth to date     Blood Culture, Routine No Growth to date    Blood culture [[id number]] Collected:  06/13/17 1819    Order Status:  Completed Specimen:  Blood Updated:  06/16/17 1903     Blood Culture, Routine No Growth to date     Blood Culture, Routine No Growth to date     Blood Culture, Routine No Growth to date     Blood Culture, Routine No Growth to date    Urine culture [[id number]] Collected:  06/11/17 1133    Order Status:  Completed Specimen:  Urine from Urine, Catheterized Updated:  06/13/17 0747     Urine Culture, Routine No growth          Assessment:    87 y/o AAF admitted with:    Small bowel obstruction. - remains NPO -  S/p Acute kidney injury (unknown baseline creatinine) but improving significantly. Currently 1.1  Mild hyperNa+  Metabolic acidosis - improving -  Anemia of chronic disease  Hypoalbuminemia severe protein malnutrition  Hx. CVA.  Hx. COPD.    Plan:    -  Continue treatment per Dr. Ortiz        Will sign off the case.          .

## 2017-06-17 NOTE — NURSING
0830- assessment completed and plan of care reviewed with the patient she is agreeable and pleasant. Central line removed and cleansed site and covered with sterile gauze 4 x 4 folded and covered with tegaderm dressing tolerated well. Patient has a new EJ site to left side of neck. No change on telemetry.       0930- patient having loose stools watery type stool dark green odorless. pericare given with each stool episode. New iv to left neck flushed easily. No change on telemetry . Patient wants to walk informed her physical therapy is working with her today. Tolerated breakfast meal set up assistance needed and sometimes she needs coaxing to eat and be fed. No nausea but is having diarrhea.       1100- patient continues to have  Episodes of watery green stool no odor good pericare given . Turned and wedge pillow in use. Fluidized boots in use and scd's are on assessed skin intact. No skin breakdown. Small areas of reddened rash on trunk and arms. New order for benadryl cream prn . No acute distress.     1230- carolyne care given turned and repositioned on right side to back and having t reposition her neck while vancomycin infusing as EJ site is positional. No swelling or redness. Dressing clean and dry. Assisted patient with meal set up she is feeding herself pureed diet no straws and head of bed high fowlers no problem with choking or coughing doesn't hold food in her mouth as observed. If she doesn't like something on her plate she will spit it out . Continue to monitor.

## 2017-06-17 NOTE — ASSESSMENT & PLAN NOTE
Likely due to central line. BCx growing GPC resembling Staph. Will remove central line now. Continue Vanc. Pending levels and BMP. Repeat BCx in AM.

## 2017-06-18 PROBLEM — R50.9 LOW GRADE FEVER: Status: RESOLVED | Noted: 2017-01-01 | Resolved: 2017-01-01

## 2017-06-18 PROBLEM — K56.609 SMALL BOWEL OBSTRUCTION: Status: RESOLVED | Noted: 2017-01-01 | Resolved: 2017-01-01

## 2017-06-18 NOTE — PROGRESS NOTES
"[facility] [place] [facility]  Progress Note    Patient Name: Seema Schreiber  MRN: [id number]  Patient Class: IP- Inpatient   Admission Date: 6/11/2017  Length of Stay: 7 days  Attending Physician: Eri Alfred MD  Primary Care Provider: Dajuan Guthrie MD        Subjective:     Principal Problem:Small bowel obstruction    HPI:  Seema Schreiber is a 86 y.o. AAF with history of MI, COPD? And CHF? who presented to [facility] from [facility]. According to documentation she was transferred for evaluation of confirmed stroke and small bowel obstruction as seen on CT at [facility]. Per EMS, pt had L-sided facial drooping and altered mental status.     Daughters initially brought pt to [place] for increased confusion (x1 day), decreased appetite (x4 days), and weakness. Pt is normally able to ambulate by herself but has been unable to these past few days. Daughters deny emesis.   Patient is awake and alert at the time of interview, denies constipation or emesis states, "I keep harking like I want to throw up". She reports last BM as yesterday, says it was formed and normal. She denies any chronic pain issues or chronic use of narcotics. Of note patient has well distributed macular papular rash on BL thighs and trunk. States her PCP told her that she had the mumps about 1 month ago, she tells me, "they said they can't do nothing about it".    Patient's labs reveal hypernatremia, dehydration with acute on chronic renal failure III. Her physical assessment is negative for acute bowel but bowel sounds completely absent. She has NG tube in place and denies NV abdominal pain or diarrhea. There is slight L facial drooping with slight blinking eyelid delay.    Hospital Course:  The patient was admitted to the hospital for eval and treatment of a small bowel obstruction, left facial droop, hypovolemic hypernatremia and pre-renal SAVITA (Cr 4). For an unknown reason, the patient was initially " "sent to the ICU but sent out the same day. MRI of her head was negative for any acute change. Per labs, patient also noted to be hypothyroid (TSH 7.4, free T4 0.8) and mild troponemia. 2D Echo revealed severe aortic valve stenosis and LVEF 25%. Per daughter, this is an old issue. Initiated on rectal ASA, howeve unable to start on BB or stating due to SBO at the time. Chest pain free. Troponemia likely demand and underlying renal dysfunction. Surgery, cardiology and nephrology consulted. NG tube placed to suction, empiric cipro/flagyl and initiated on IVFs. Small obstruction and hypernatremia eventually resolved, however severe dysphagia was noted to be an issue and is persistent. MRI of brain without acute stroke, but rather chronic cerebrovascular disease. SAVITA also improved with IVFs (gently infused given Syst HF) NG tube had already been removed by surgery. Patient insisted on not putting NG tube back in despite dysphagia. I suggested starting tube feeds but patient would like to continue with TPN. GI consulted for PEG on 6/16. Surprisingly, patient was able to properly eat pureed diet with thin liquids. Plans for PEG placement canceled. Developed fever. Blood culture 2/4 bottles with MRSA. Right groin central line removed and a right EJ (18G) placed. TPN discontinued. Fevers resolved and hemodynamically improved soon after. Then developed multiple loose stools (green). SAVITA afterwards. CDiff pending      Interval History: Multiple BMs yesterday. Not characteristic of CDiff. CDiff ordered. Pending. Actually no BMs today. Is hyperactive and not in a good mood today. Appears disoriented, but able to state "I know I am in the hospital. I am not that sick".     Review of Systems   Constitutional: Negative for activity change.   Respiratory: Negative for chest tightness and shortness of breath.    Cardiovascular: Negative for chest pain.   Gastrointestinal: Negative for abdominal pain, diarrhea and vomiting. "   Genitourinary: Negative.    Musculoskeletal: Negative.    Neurological: Negative.    Psychiatric/Behavioral: Negative.      Objective:     Vital Signs (Most Recent):  Temp: 98.4 °F (36.9 °C) (06/18/17 1100)  Pulse: 107 (06/18/17 1100)  Resp: (!) 22 (06/18/17 1100)  BP: 116/62 (06/18/17 1100)  SpO2: 96 % (06/18/17 1100) Vital Signs (24h Range):  Temp:  [97.8 °F (36.6 °C)-100.9 °F (38.3 °C)] 98.4 °F (36.9 °C)  Pulse:  [] 107  Resp:  [22-25] 22  SpO2:  [94 %-99 %] 96 %  BP: (101-121)/(55-62) 116/62     Weight: 66.7 kg (147 lb)  Body mass index is 25.23 kg/m².    Intake/Output Summary (Last 24 hours) at 06/18/17 1259  Last data filed at 06/18/17 0400   Gross per 24 hour   Intake              480 ml   Output                1 ml   Net              479 ml      Physical Exam   Constitutional: She appears well-developed and well-nourished.   HENT:   Head: Normocephalic.   Eyes: Conjunctivae and EOM are normal.   Cardiovascular: Normal rate.    Pulmonary/Chest: Effort normal and breath sounds normal. No respiratory distress. She has no wheezes. She has no rales.   On low flow NC   Abdominal: Soft. Bowel sounds are normal. She exhibits no distension. There is no tenderness. There is no guarding.   Musculoskeletal: Normal range of motion. She exhibits no edema.   Neurological: She is alert.   Hyperactive, not in good mood. Oriented to place and person, not time   Skin: Skin is warm and dry.   Psychiatric: She has a normal mood and affect. Her behavior is normal. Judgment and thought content normal.   Vitals reviewed.      Significant Labs: All pertinent labs within the past 24 hours have been reviewed.    Magnesium:  No results for input(s): MG in the last 48 hours.    Significant Imaging: I have reviewed all pertinent imaging results/findings within the past 24 hours.  I have reviewed and interpreted all pertinent imaging results/findings within the past 24 hours.    Assessment/Plan:      Oropharyngeal dysphagia     This is severe. No acute stroke per MRI. Unfortunately NG tube has been removed once SBO appeared to have resolved, and patient does not want it back in. Was actually able to tolerate pureed diet with thin liquids, which was unexpected. GI evaluated patient. PEG no longer needed. I updated GI. TPN discontinued and central line removed. Encouraged PO intake. Continue Pureed diet with thin liquid, strict aspiration precautions. Appreciate further Speech recs           Chronic systolic heart failure    LVEF of 25% and severe aortic stenosis. Repeat 2D Echo on 6/16 showed LVEF of 55%. Continue Medical management at this time. No chest pain or shortness of breath. Stable on low flow nasal cannula. Continue statin and ASA to PO (all meds crushed with puree). Hold off on carvedilol and ACE-I as patient is septic. Considering ischemic eval as outpatient. BP stable. Appreciate further cards recs (following PRN)          Gram positive sepsis    Likely due to central line. BCx grew MRSA, sensitive to doxycycline, clindamycin and bactrim. Central line removed. EJ in place. Continue Vanc (holding today for random level of 23). Repeat random in AM. Renal panel in AM. Repeat BCx sent to micro today. Will follow. TTE showed possible leaflet calcifications but cannot rule out vegetations. Murmur is unchanged and EF is actually better. Hold off on GRIS temporarily as patient is currently delirious. Continue abx treatment.           Acute renal failure    Recurred after multiple episodes of loose stool yesterday. Not eating today (refused). Will start gentle IVFs. Repeat Renal panel in AM.           Sinus tachycardia by electrocardiogram    Likely due to relative hypotension. Better after IVFs. BB PRN          Acquired hypothyroidism    Change levothyroxine to PO          Nonrheumatic aortic valve stenosis    Severe, per Echo. No acute issues          Cardiomyopathy              Weakness    Lives with daughter. Walks with rolling  walker. PT/OT consulted. Recommending SNF           Severe malnutrition    F/u Speech recs. Stop TPN. Continued pureed diet          Facial droop    Acute according to family per note; rash on trunk and thighs. No longer present  Neurology following  MRI- negative. On ASA. Started statin    RPR neg, sed rate and crp high.         Anemia, chronic disease    No overt signs of bleeding / Anemia studies liekly 2/2 renal disease  Anemia studies. B12 and folic acid WNL. Iron in low normal range. Hgb decreased likely due to dilution.         Elevated troponin I level    Likely 2/2 renal - denies chest pain            VTE Risk Mitigation         Ordered     enoxaparin injection 30 mg  Daily     Route:  Subcutaneous        06/11/17 1055     Medium Risk of VTE  Once      06/11/17 0856     Place sequential compression device  Until discontinued      06/11/17 0856     Place ISHMAEL hose  Until discontinued      06/11/17 0856          Eri Ortiz MD  Department of Hospital Medicine   [facility]

## 2017-06-18 NOTE — ASSESSMENT & PLAN NOTE
Acute according to family per note; rash on trunk and thighs. No longer present  Neurology following  MRI- negative. On ASA. Started statin    RPR neg, sed rate and crp high.

## 2017-06-18 NOTE — PT/OT/SLP PROGRESS
"Physical Therapy      Seema Schreiber  MRN: [id number]    Patient not seen today secondary to pt refusal to participate with physical therapy.       Nurse Rosa notified    MD Aubrie confirmed that pt is in a "bad mood today" and just to leave pt alone.       Will follow-up at later time/day.    Gil Robles, PT   06/18/2017      "

## 2017-06-18 NOTE — NURSING
1430- patient having several watery stools pericare given each time. md notified and has seen patient several times today . No fever , vitals stable. Tolerating meals. Aspiration precautions in effect . Head of bed elevated nasal canula at 2 liters . Continue to monitor.      1515- patient offered fluid no problem swallowing . Diarrhea continues watery dark green color odorless. Moderate amounts. Good per icare given no skin break down . No change on telemetry . Right groin site no swelling tenderness to site no active drainage dressing clean and dry no active drainage . Redness noted to groin site will monitor no streaking present. Fluidized boots on . Patient has received partial baths after each stool and barrier cream to protect skin. perdiatric electrodes used to prevent skin irritation .

## 2017-06-18 NOTE — ASSESSMENT & PLAN NOTE
This is severe. No acute stroke per MRI. Unfortunately NG tube has been removed once SBO appeared to have resolved, and patient does not want it back in. Was actually able to tolerate pureed diet with thin liquids, which was unexpected. GI evaluated patient. PEG no longer needed. I updated GI. TPN discontinued and central line removed. Encouraged PO intake. Continue Pureed diet with thin liquid, strict aspiration precautions. Appreciate further Speech recs

## 2017-06-18 NOTE — ASSESSMENT & PLAN NOTE
Recurred after multiple episodes of loose stool yesterday. Not eating today (refused). Will start gentle IVFs. Repeat Renal panel in AM.

## 2017-06-18 NOTE — ASSESSMENT & PLAN NOTE
Likely due to central line. BCx grew MRSA, sensitive to doxycycline, clindamycin and bactrim. Central line removed. EJ in place. Continue Vanc (holding today for random level of 23). Repeat random in AM. Renal panel in AM. Repeat BCx sent to [facility] today. Will follow. TTE showed possible leaflet calcifications but cannot rule out vegetations. Murmur is unchanged and EF is actually better. Hold off on GRIS temporarily as patient is currently delirious. Continue abx treatment.

## 2017-06-18 NOTE — ASSESSMENT & PLAN NOTE
LVEF of 25% and severe aortic stenosis. Repeat 2D Echo on 6/16 showed LVEF of 55%. Continue Medical management at this time. No chest pain or shortness of breath. Stable on low flow nasal cannula. Continue statin and ASA to PO (all meds crushed with puree). Hold off on carvedilol and ACE-I as patient is septic. Considering ischemic eval as outpatient. BP stable. Appreciate further cards recs (following PRN)

## 2017-06-18 NOTE — NURSING
1830- tolerated supper meal daughter at bedside and assisted with feeding. Patient tolerated well. Sitting upright after meal no straws used. Right groin rechecked again redness noted around diameter of insertion site but no fever to skin no swelling no streaking dressing remains intact and dry , clean. Patient has had several loose stools watery in consistency no odor. Good pericare given each time and barrier cream used too. No change on telemetry.       0700- rounding report given to sweetie sanchez rn on patients progress and updated hand off report sheet given. No acute events or changes .

## 2017-06-18 NOTE — NURSING
1600- patient agitated avs sys monitor in room daughter perlita at bedside and patient confused and agitated . No change on telemetry.     1700- soft wrist restraints placed , will continue to monitor . Changed diaper good urine output tolerating iv fluids. No bowel movements. Daughter remains at bedside and supportive.         1740- patient tolerating restraints. Supportive interaction daughter at bedside very supportive and attentive to patient. No bowel movement. Tolerating fluids. Iv site to left EJ intact clean and dry . No change on telemetry

## 2017-06-18 NOTE — SUBJECTIVE & OBJECTIVE
"Interval History: Multiple BMs yesterday. Not characteristic of CDiff. CDiff ordered. Pending. Actually no BMs today. Is hyperactive and not in a good mood today. Appears disoriented, but able to state "I know I am in the hospital. I am not that sick".     Review of Systems   Constitutional: Negative for activity change.   Respiratory: Negative for chest tightness and shortness of breath.    Cardiovascular: Negative for chest pain.   Gastrointestinal: Negative for abdominal pain, diarrhea and vomiting.   Genitourinary: Negative.    Musculoskeletal: Negative.    Neurological: Negative.    Psychiatric/Behavioral: Negative.      Objective:     Vital Signs (Most Recent):  Temp: 98.4 °F (36.9 °C) (06/18/17 1100)  Pulse: 107 (06/18/17 1100)  Resp: (!) 22 (06/18/17 1100)  BP: 116/62 (06/18/17 1100)  SpO2: 96 % (06/18/17 1100) Vital Signs (24h Range):  Temp:  [97.8 °F (36.6 °C)-100.9 °F (38.3 °C)] 98.4 °F (36.9 °C)  Pulse:  [] 107  Resp:  [22-25] 22  SpO2:  [94 %-99 %] 96 %  BP: (101-121)/(55-62) 116/62     Weight: 66.7 kg (147 lb)  Body mass index is 25.23 kg/m².    Intake/Output Summary (Last 24 hours) at 06/18/17 1259  Last data filed at 06/18/17 0400   Gross per 24 hour   Intake              480 ml   Output                1 ml   Net              479 ml      Physical Exam   Constitutional: She appears well-developed and well-nourished.   HENT:   Head: Normocephalic.   Eyes: Conjunctivae and EOM are normal.   Cardiovascular: Normal rate.    Pulmonary/Chest: Effort normal and breath sounds normal. No respiratory distress. She has no wheezes. She has no rales.   On low flow NC   Abdominal: Soft. Bowel sounds are normal. She exhibits no distension. There is no tenderness. There is no guarding.   Musculoskeletal: Normal range of motion. She exhibits no edema.   Neurological: She is alert.   Hyperactive, not in good mood. Oriented to place and person, not time   Skin: Skin is warm and dry.   Psychiatric: She has a normal " mood and affect. Her behavior is normal. Judgment and thought content normal.   Vitals reviewed.      Significant Labs: All pertinent labs within the past 24 hours have been reviewed.    Magnesium:  No results for input(s): MG in the last 48 hours.    Significant Imaging: I have reviewed all pertinent imaging results/findings within the past 24 hours.  I have reviewed and interpreted all pertinent imaging results/findings within the past 24 hours.

## 2017-06-18 NOTE — NURSING
7826- patient pulled out left EJ , paperwork faxed by charge andres rebollar rn to tra sys remote telemetry monitoring. Family updated purpose of tra sys monitor. All in agreement . Patients daughter perlita here as well. Update given to her. Paged dr rivers informed right away patient removed iv and has become increasingly agitated. Dr rivers rounded and immediately reinserted to left EJ iv site restarted fluids at 75 ml per hour.

## 2017-06-18 NOTE — ASSESSMENT & PLAN NOTE
NG tube removed as obstruction had resolved. She had remained strictly NPO for severe dysphagia. Did not want NG tube placed back in and preferred TPN instead. TPN now stopped. Is having BMs (had multiple on 6/17). Abd is non tender, soft and with normal BS. Appreciate further input from surgery who are following PRN.

## 2017-06-18 NOTE — NURSING
"0720- bedside rounding report received from sweetie stone artur on patients progress and updated hand off report sheet given. Patient awake asked where perlita was i told her that her daughter perlita was at [employer] and will be here to see her today. Patient states", oh ok ." patient denies needs. No changes on telemetry.plan of care reviewed no changes in plan of care nurse sweetie reports two small bowel movements overnight..  "  used

## 2017-06-19 PROBLEM — E11.649 HYPOGLYCEMIA ASSOCIATED WITH DIABETES: Status: ACTIVE | Noted: 2017-01-01

## 2017-06-19 NOTE — PT/OT/SLP PROGRESS
Speech Language Pathology  Treatment    Seema Schreiber   MRN: [id number]   Admitting Diagnosis: Small bowel obstruction    Diet recommendations: Solid Diet Level: Puree  Liquid Diet Level: Thin Feed only when awake/alert, HOB to 90 degrees, Small bites/sips, Check for pocketing/oral residue, Remain upright 30 minutes post meal and Assistance with meals    SLP Treatment Date: 06/19/17  Speech Start Time: 1320     Speech Stop Time: 1340     Speech Total (min): 20 min       TREATMENT BILLABLE MINUTES:  Treatment Swallowing Dysfunction 20    Has the patient been evaluated by SLP for swallowing? : Yes  Keep patient NPO?: No   General Precautions: Standard, aspiration, fall  Current Respiratory Status: nasal cannula       Subjective:    Patient has had increase in confusion and agitation.  Extremely sleepy today with minimal po intake with total assistance for feeding.  Difficult to keep awake.  ST to continue to follow for safe po intake       Objective:   Patient found with: peripheral IV, telemetry, pressure relief boots, oxygen, restraints    FIM:        Assessment:  Seema Schreiber is a 86 y.o. female with a medical diagnosis of Small bowel obstruction and presents with oral dysphagia    Discharge recommendations: Discharge Facility/Level Of Care Needs: nursing facility, skilled     Goals:    SLP Goals        Problem: SLP Goal    Goal Priority Disciplines Outcome   SLP Goal     SLP Ongoing (interventions implemented as appropriate)   Description:  Short Term Goals: (updated)  1. Pt will tolerate puree diet and thin liquids with no overt s/s of aspiration noted.   2. Pt will demonstrate/verbalize 3 safe swallowing precautions with min cues.   3. Pt will tolerate dental soft/regualr trials with dentures in place for possible diet upgrade pending improved bolus manipulation.   4. Pt will complete oral motor exercises x10 each to improve overall oral motor strength and coordination.     Short term goals: (Previous)  1) assess  po intake when able  2) oral motor exercises                      Plan:   Patient to be seen Therapy Frequency: 3 x/week   Plan of Care expires: 07/06/17  Plan of Care reviewed with: daughter, patient  SLP Follow-up?: Yes  SLP - Next Visit Date: 06/20/17           Michelle Reynoso CCC-SLP  06/19/2017

## 2017-06-19 NOTE — ASSESSMENT & PLAN NOTE
Recurred after multiple episodes of loose stools that resolved on its own. Renal function improving with fluids. Is hungry and thirsty but dysphagia limits amount of PO intake. Will continue gentle IVFs for now.

## 2017-06-19 NOTE — PROGRESS NOTES
Per bed huddle pt is not ready for discharge at this time, but that he will need SNF placement when medically ready.    0926- call received from Junaid with [facility]  Stating that they will accept the pt once medically ready for discharge and that family is suppose to come to complete paper work on today at the facility.  TN to continue to follow and update facility as information is available.

## 2017-06-19 NOTE — ASSESSMENT & PLAN NOTE
Is symptomatic. Patient delirious. D50 and start D5% infusion. POC BG AC/HS. PO intake with assistance

## 2017-06-19 NOTE — ASSESSMENT & PLAN NOTE
No overt signs of bleeding / Anemia studies liekly 2/2 renal disease  Anemia studies. B12 and folic acid WNL. Iron in low normal range. Hgb decreased likely due to dilution. Remains unchanged. No signs of bleed.

## 2017-06-19 NOTE — ASSESSMENT & PLAN NOTE
Likely due to central line. BCx grew MRSA, sensitive to doxycycline, clindamycin and bactrim. Central line removed. EJ in place. Continue Vanc (dosed based on daily level). Repeat random in AM. Renal panel in AM. Repeat BCx 6/18 so far negative. TTE suggests calcification of mitral leaflet. Review of Echo done on 6/12 showing thickness of mitral valve. Murmur is unchanged and EF is actually better. Hold off on GRIS as patient is currently delirious and may not be a good candidate for this type of intervention. I will consult ID for co-management

## 2017-06-19 NOTE — ASSESSMENT & PLAN NOTE
LVEF of 25% and severe aortic stenosis. Repeat 2D Echo on 6/16 showed LVEF of 55%. Continue Medical management at this time. No chest pain or shortness of breath. Stable on low flow nasal cannula. Continue statin and ASA to PO (all meds crushed with puree). Hold off on carvedilol and ACE-I as patient is septic and BP is at goal. Considering ischemic eval as outpatient. Appreciate further cards recs (following PRN)

## 2017-06-19 NOTE — PROGRESS NOTES
MD Ortiz at bedside stating that she wants the patient in mitten restraints instead of the soft wrist restraints. Removed the soft wrist restraints and applied bilateral mitten restraints.

## 2017-06-19 NOTE — PLAN OF CARE
Problem: SLP Goal  Goal: SLP Goal  Short Term Goals: (updated)  1. Pt will tolerate puree diet and thin liquids with no overt s/s of aspiration noted.   2. Pt will demonstrate/verbalize 3 safe swallowing precautions with min cues.   3. Pt will tolerate dental soft/regualr trials with dentures in place for possible diet upgrade pending improved bolus manipulation.   4. Pt will complete oral motor exercises x10 each to improve overall oral motor strength and coordination.     Short term goals: (Previous)  1) assess po intake when able  2) oral motor exercises    Outcome: Ongoing (interventions implemented as appropriate)  Patient has had increase in confusion and agitation.  Extremely sleepy today with minimal po intake with total assistance for feeding.  Difficult to keep awake.  ST to continue to follow for safe po intake

## 2017-06-19 NOTE — NURSING
1920- bedside rounding report given to jumana dominguez rn on patients progress and updated hand off report sheet given as well. No acute events . Patient tolerating non violent soft wrist restraints and daughters presence at bedside. Mery sys monitor remains in use and bed alarm is on. Skin intact. Patient turned and repositioned as needed. No skin breakdown.

## 2017-06-19 NOTE — CONSULTS
Consult Note  Infectious Disease    Consult Requested By: Eri Alfred MD    Reason for Consult: mrsa sepsis    SUBJECTIVE:     History of Present Illness:  Patient is a 86 y.o. female presents with  Stroke like symptoms. While she was here it was noted she also had mariam and a non-stemi. She was managed conservatively for an sbo. She spiked a fever on 6/12/17 and again on 6/15/17  Blood cultures from 6/12/17 and 6/13/17 were negative and then turned positive on 6/16/17 for an mrsa with an twyla of 2. Her tlc was pulled and tip is with no growth. She continues with obtundation. A tte on 6/12/17 showed an ef of 25 % and severe aortic stenosis. A tte repeated on 6/17/17 now says her ef is 55% and severe aortic stenosis.her blood has cleared form 6/18/17 at 24 hours of incubation. She continues with agitation and confusion. S. Creatinine is 1.3 and her last dose of vancomycin was 6/18/17 with a random vancomycin level this am of 14.5.    Past Medical History:   Diagnosis Date    CHF (congestive heart failure)     COPD (chronic obstructive pulmonary disease)     MI, acute, non ST segment elevation      No past surgical history on file.  No family history on file.  Social History   Substance Use Topics    Smoking status: Unknown If Ever Smoked    Smokeless tobacco: Not on file    Alcohol use Not on file       Review of patient's allergies indicates:   Allergen Reactions    Codeine     Penicillins         Antibiotics     None          Review of Systems:  Review of systems not obtained due to patient factors obtunded and confused.    OBJECTIVE:     Vital Signs (Most Recent)  Temp: 98.2 °F (36.8 °C) (06/19/17 0739)  Pulse: 108 (06/19/17 0739)  Resp: (!) 21 (06/19/17 0739)  BP: (!) 107/51 (06/19/17 0739)  SpO2: (!) 93 % (06/19/17 0739)    Temperature Range Min/Max (Last 24H):  Temp:  [98.2 °F (36.8 °C)-100.6 °F (38.1 °C)]     Physical Exam:  General: appears stated age  Eyes: conjunctivae/corneas clear.  PERRL..  HENT: Head:normocephalic, atraumatic. Ears:not examined. Nose: Nares normal. Septum midline. Mucosa normal. No drainage or sinus tenderness., no discharge. Throat: lips, mucosa, and tongue normal; teeth and gums normal and no throat erythema.  Neck: supple, symmetrical, trachea midline, no JVD and thyroid not enlarged, symmetric, no tenderness/mass/nodules  Lungs:  diminished breath sounds bibasilar and bilaterally and rales bibasilar and bilaterally  Cardiovascular: Heart: loud esm 3/6 lse. Chest Wall: no tenderness. Extremities: no cyanosis or edema, or clubbing. Pulses: 2+ and symmetric.  Abdomen/Rectal: Abdomen: soft, non-tender non-distented; bowel sounds normal; no masses,  no organomegaly. Rectal: not examined  Skin: Skin color, texture, turgor normal. No rashes or lesions  Musculoskeletal:no clubbing, cyanosis    Laboratory:  CBC    Recent Labs  Lab 06/19/17  0551   WBC 11.70   RBC 3.83*   HGB 8.6*   HCT 28.0*   PLT SEE COMMENT     BMP    Recent Labs  Lab 06/19/17  0551   CO2 17*   BUN 35*   CREATININE 1.3   CALCIUM 8.7     No results for input(s): COLORU, CLARITYU, SPECGRAV, PHUR, PROTEINUA, GLUCOSEU, BILIRUBINCON, BLOODU, WBCU, RBCU, BACTERIA, MUCUS, NITRITE, LEUKOCYTESUR, UROBILINOGEN, HYALINECASTS in the last 168 hours.  Microbiology Results (last 7 days)     Procedure Component Value Units Date/Time    Blood culture [[id number]] Collected:  06/18/17 0533    Order Status:  Completed Specimen:  Blood Updated:  06/19/17 0703     Blood Culture, Routine No Growth to date     Blood Culture, Routine No Growth to date    Narrative:       hard stick    Blood culture [[id number]] Collected:  06/18/17 0530    Order Status:  Completed Specimen:  Blood from Peripheral, Left  Arm Updated:  06/19/17 0703     Blood Culture, Routine No Growth to date     Blood Culture, Routine No Growth to date    Narrative:       hard stick    Blood culture [[id number]] Collected:  06/13/17 1819    Order Status:  Completed  Specimen:  Blood Updated:  06/18/17 1903     Blood Culture, Routine No growth after 5 days.    Clostridium difficile EIA [[id number]]     Order Status:  Canceled Specimen:  Stool from Stool     IV catheter culture [[id number]] Collected:  06/17/17 0756    Order Status:  Completed Specimen:  Catheter Tip from Catheter Tip, Peripheral Updated:  06/18/17 1004     Aerobic Culture - Cath tip No growth    Narrative:       Central line catheter tip.    Blood culture [[id number]]  (Susceptibility) Collected:  06/16/17 0055    Order Status:  Completed Specimen:  Blood Updated:  06/18/17 0816     Blood Culture, Routine Gram stain aer bottle: Gram positive cocci in clusters resembling Staph     Blood Culture, Routine Previously called 06/16/2017  22:36     Blood Culture, Routine called MRSA to [facility] 06/18/2017  08:15     Blood Culture, Routine METHICILLIN RESISTANT STAPHYLOCOCCUS AUREUS    Blood culture [[id number]]  (Susceptibility) Collected:  06/16/17 0030    Order Status:  Completed Specimen:  Blood Updated:  06/18/17 0815     Blood Culture, Routine Gram stain aer bottle: Gram positive cocci in clusters resembling Staph      Blood Culture, Routine Results called to and read back by:Rosa Angu[facility] 06/16/2017  14:11     Blood Culture, Routine called MRSA to [facility] Angu[facility] 06/18/2017  08:15     Blood Culture, Routine METHICILLIN RESISTANT STAPHYLOCOCCUS AUREUS    Blood culture [[id number]] Collected:  06/12/17 1852    Order Status:  Completed Specimen:  Blood Updated:  06/17/17 2103     Blood Culture, Routine No growth after 5 days.    IV catheter culture [[id number]]     Order Status:  Canceled Specimen:  Catheter Tip from Catheter Tip, Intrajugular     Urine culture [[id number]] Collected:  06/11/17 1133    Order Status:  Completed Specimen:  Urine from Urine, Catheterized Updated:  06/13/17 0747     Urine Culture, Routine No growth          Diagnostic Results:  Labs: Reviewed  ECG: Reviewed  X-Ray:  Reviewed  US: Reviewed  Echo: Reviewed    ASSESSMENT/PLAN:     Active Hospital Problems    Diagnosis  POA    Hypoglycemia associated with diabetes [E11.649]  Yes    Gram positive sepsis [A41.89]  No    Sinus tachycardia by electrocardiogram [R00.0]  Yes    Oropharyngeal dysphagia [R13.12]  Yes    Acquired hypothyroidism [E03.9]  Yes    Cardiomyopathy [I42.9]  Yes    Nonrheumatic aortic valve stenosis [I35.0]  Yes    Severe malnutrition [E43]  Yes    Weakness [R53.1]  Yes    Acute renal failure [N17.9]  Yes    Chronic systolic heart failure [I50.22]  Yes    Elevated troponin I level [R74.8]  Yes    Anemia, chronic disease [D63.8]  Yes    Facial droop [R29.810]  Yes      Resolved Hospital Problems    Diagnosis Date Resolved POA    *Small bowel obstruction [K56.69] 06/18/2017 Yes    Low grade fever [R50.9] 06/18/2017 Yes    Hypernatremia [E87.0] 06/13/2017 Yes    Tachycardia [R00.0] 06/13/2017 Yes       1.severe aortic stenosis  2. mrsa sepsis  Quick clearance argues against endocarditis  At age 86 unsure what could be done even if she developed aortic valve endocarditis  Continue with iv vancomycin  Watch renal function on vanco- currently improving  If blood from 6/18/17 continues to stay negative , will offer iv abx till 7/2/17  Other option is hospice and comfort care in this elderly frail woman with poor prognosis

## 2017-06-19 NOTE — SUBJECTIVE & OBJECTIVE
Interval History: patient stated she did not sleep well last night. Still appearing encephalopathic but knows where she is and who she is. Not oriented to date. Hypoglycemic to 66. Is very thirsty.     Review of Systems   Constitutional: Negative for activity change.   Respiratory: Negative for chest tightness and shortness of breath.    Cardiovascular: Negative for chest pain.   Gastrointestinal: Negative for abdominal pain, diarrhea and vomiting.   Genitourinary: Negative.    Musculoskeletal: Negative.    Neurological: Negative.    Psychiatric/Behavioral: Negative.         Insomnia     Objective:     Vital Signs (Most Recent):  Temp: 98.2 °F (36.8 °C) (06/19/17 0739)  Pulse: 108 (06/19/17 0739)  Resp: (!) 21 (06/19/17 0739)  BP: (!) 107/51 (06/19/17 0739)  SpO2: (!) 93 % (06/19/17 0739) Vital Signs (24h Range):  Temp:  [98.2 °F (36.8 °C)-100.6 °F (38.1 °C)] 98.2 °F (36.8 °C)  Pulse:  [102-125] 108  Resp:  [21-26] 21  SpO2:  [93 %-100 %] 93 %  BP: (103-123)/(51-66) 107/51     Weight: 68.9 kg (151 lb 12.8 oz)  Body mass index is 26.06 kg/m².    Intake/Output Summary (Last 24 hours) at 06/19/17 0817  Last data filed at 06/18/17 1900   Gross per 24 hour   Intake          1387.08 ml   Output                0 ml   Net          1387.08 ml      Physical Exam   Constitutional: She appears well-developed and well-nourished.   HENT:   Head: Normocephalic.   Eyes: Conjunctivae and EOM are normal.   Cardiovascular: Normal rate.    Pulmonary/Chest: Effort normal and breath sounds normal. No respiratory distress. She has no wheezes. She has no rales.   On low flow NC   Abdominal: Soft. Bowel sounds are normal. She exhibits no distension. There is no tenderness. There is no guarding.   Musculoskeletal: Normal range of motion. She exhibits no edema.   Neurological: She is alert.   Still appears encephalopathic. Oriented to place and person, not time   Skin: Skin is warm and dry.   Psychiatric: She has a normal mood and affect. Her  behavior is normal. Judgment and thought content normal.   Vitals reviewed.      Significant Labs: All pertinent labs within the past 24 hours have been reviewed.    Magnesium:  No results for input(s): MG in the last 48 hours.    Significant Imaging: I have reviewed all pertinent imaging results/findings within the past 24 hours.  I have reviewed and interpreted all pertinent imaging results/findings within the past 24 hours.

## 2017-06-19 NOTE — PLAN OF CARE
06/19/17 1206   Discharge Reassessment   Assessment Type Discharge Planning Reassessment   Can the patient answer the patient profile reliably? Yes, cognitively intact   How does the patient rate their overall health at the present time? Fair   Describe the patient's ability to walk at the present time. Walks with the help of equipment   How often would a person be available to care for the patient? Whenever needed   Number of comorbid conditions (as recorded on the chart) Four   During the past month, has the patient often been bothered by feeling down, depressed or hopeless? No   During the past month, has the patient often been bothered by little interest or pleasure in doing things? No   Discharge plan remains the same: No   Provided patient/caregiver education on the expected discharge date and the discharge plan No   Discharge Plan A Skilled Nursing Facility   Discharge Plan B Home with family;Home Health   Change in patient condition or support system No   Patient choice form signed by patient/caregiver Yes   Explained to the the patient/caregiver why the discharge planned changed: Yes   Involved the patient/caregiver in establishing a new discharge plan: Yes

## 2017-06-19 NOTE — PROGRESS NOTES
Seema Schreiber is a 86 y.o. female patient.    Follow for SAVITA, hypernatremia    No new c/o  Comfortable    Scheduled Meds:   albuterol-ipratropium 2.5mg-0.5mg/3mL  3 mL Nebulization Q8H WA    aspirin  81 mg Oral Daily    atorvastatin  10 mg Oral Daily    enoxaparin  30 mg Subcutaneous Daily    levothyroxine  25 mcg Oral Before breakfast    quetiapine  25 mg Oral QHS    [START ON 6/20/2017] vancomycin (VANCOCIN) IVPB  750 mg Intravenous Q24H       Review of patient's allergies indicates:   Allergen Reactions    Codeine     Penicillins          Vital Signs Range (Last 24H):  Temp:  [98.2 °F (36.8 °C)-100.6 °F (38.1 °C)]   Pulse:  [107-125]   Resp:  [21-26]   BP: (103-123)/(51-66)   SpO2:  [93 %-100 %]     I & O (Last 24H):  Intake/Output Summary (Last 24 hours) at 06/19/17 1115  Last data filed at 06/19/17 0827   Gross per 24 hour   Intake          1164.58 ml   Output                0 ml   Net          1164.58 ml           Physical Exam:  General appearance: well developed, cachectic, no distress  Lungs:  clear to auscultation bilaterally and normal respiratory effort  Heart: regular rate and rhythm  Abdomen: soft, non-tender non-distented; bowel sounds normal; no masses,  no organomegaly  Extremities: no cyanosis or edema, or clubbing    Laboratory:  CBC:   Recent Labs  Lab 06/19/17  0551   WBC 11.70   RBC 3.83*   HGB 8.6*   HCT 28.0*   PLT SEE COMMENT   MCV 73*   MCH 22.5*   MCHC 30.7*     CMP:   Recent Labs  Lab 06/19/17  0551   GLU 66*   CALCIUM 8.7   ALBUMIN 1.9*   *   K 4.3   CO2 17*   *   BUN 35*   CREATININE 1.3       Imp/Plan    SAVITA - creatinine stable  Hypernatremia - free water deficit  Metabolic acidosis - slowly improving  Chronic systolic CHF  MRSA sespsis  H/o CVA  Anemia of chronic disease    Continue present Rx  CMP in am      Trac T Le  6/19/2017

## 2017-06-19 NOTE — PROGRESS NOTES
Report received from LUIS F Hayden. Visualized patient and assessed patient's overall condition and appearance.  No complaints. NAD noted. Soft wrist restraints in place. Will continue to monitor.

## 2017-06-19 NOTE — PROGRESS NOTES
"[facility] Medicine  Progress Note    Patient Name: Seema Schreiber  MRN: [id number]  Patient Class: IP- Inpatient   Admission Date: 6/11/2017  Length of Stay: 8 days  Attending Physician: Eri Alfred MD  Primary Care Provider: Dajuan Guthrie MD        Subjective:     Principal Problem:Small bowel obstruction    HPI:  Seema Schreiber is a 86 y.o. AAF with history of MI, COPD? And CHF? who presented to [facility] from [facility]. According to documentation she was transferred for evaluation of confirmed stroke and small bowel obstruction as seen on CT at [facility]. Per EMS, pt had L-sided facial drooping and altered mental status.     Daughters initially brought pt to [place] for increased confusion (x1 day), decreased appetite (x4 days), and weakness. Pt is normally able to ambulate by herself but has been unable to these past few days. Daughters deny emesis.   Patient is awake and alert at the time of interview, denies constipation or emesis states, "I keep harking like I want to throw up". She reports last BM as yesterday, says it was formed and normal. She denies any chronic pain issues or chronic use of narcotics. Of note patient has well distributed macular papular rash on BL thighs and trunk. States her PCP told her that she had the mumps about 1 month ago, she tells me, "they said they can't do nothing about it".    Patient's labs reveal hypernatremia, dehydration with acute on chronic renal failure III. Her physical assessment is negative for acute bowel but bowel sounds completely absent. She has NG tube in place and denies NV abdominal pain or diarrhea. There is slight L facial drooping with slight blinking eyelid delay.    Hospital Course:  The patient was admitted to the hospital for eval and treatment of a small bowel obstruction, left facial droop, hypovolemic hypernatremia and pre-renal SAVITA (Cr 4). For an unknown reason, the patient was initially " sent to the ICU but sent out the same day. MRI of her head was negative for any acute change. Per labs, patient also noted to be hypothyroid (TSH 7.4, free T4 0.8) and mild troponemia. 2D Echo revealed severe aortic valve stenosis and LVEF 25%. Per daughter, this is an old issue. Initiated on rectal ASA, howeve unable to start on BB or stating due to SBO at the time. Chest pain free. Troponemia likely demand and underlying renal dysfunction. Surgery, cardiology and nephrology consulted. NG tube placed to suction, empiric cipro/flagyl and initiated on IVFs. Small obstruction and hypernatremia eventually resolved, however severe dysphagia was noted to be an issue and is persistent. MRI of brain without acute stroke, but rather chronic cerebrovascular disease. SAVITA also improved with IVFs (gently infused given Syst HF) NG tube had already been removed by surgery. Patient insisted on not putting NG tube back in despite dysphagia. I suggested starting tube feeds but patient would like to continue with TPN. GI consulted for PEG on 6/16. Surprisingly, patient was able to properly eat pureed diet with thin liquids. Plans for PEG placement canceled. Developed fever. Blood culture 2/4 bottles with MRSA. Right groin central line removed and a right EJ (18G) placed. TPN discontinued. Fevers resolved and hemodynamically improved soon after. Repeat BCx 6/18 so far negative. On vancomycin dosed pending daily levels. Then developed multiple loose stools (green), which resolved on its own. SAVITA afterwards. Improved with normal saline infusion. PO intake inconsistent. Hypoglycemic and symptomatic. Will give one amp D50% now and start D5% infusion (normal saline caused mild hypernatremia and hyperchloremic metabolic acidosis).     Interval History: patient stated she did not sleep well last night. Still appearing encephalopathic but knows where she is and who she is. Not oriented to date. Hypoglycemic to 66. Is very thirsty.     Review  of Systems   Constitutional: Negative for activity change.   Respiratory: Negative for chest tightness and shortness of breath.    Cardiovascular: Negative for chest pain.   Gastrointestinal: Negative for abdominal pain, diarrhea and vomiting.   Genitourinary: Negative.    Musculoskeletal: Negative.    Neurological: Negative.    Psychiatric/Behavioral: Negative.         Insomnia     Objective:     Vital Signs (Most Recent):  Temp: 98.2 °F (36.8 °C) (06/19/17 0739)  Pulse: 108 (06/19/17 0739)  Resp: (!) 21 (06/19/17 0739)  BP: (!) 107/51 (06/19/17 0739)  SpO2: (!) 93 % (06/19/17 0739) Vital Signs (24h Range):  Temp:  [98.2 °F (36.8 °C)-100.6 °F (38.1 °C)] 98.2 °F (36.8 °C)  Pulse:  [102-125] 108  Resp:  [21-26] 21  SpO2:  [93 %-100 %] 93 %  BP: (103-123)/(51-66) 107/51     Weight: 68.9 kg (151 lb 12.8 oz)  Body mass index is 26.06 kg/m².    Intake/Output Summary (Last 24 hours) at 06/19/17 0817  Last data filed at 06/18/17 1900   Gross per 24 hour   Intake          1387.08 ml   Output                0 ml   Net          1387.08 ml      Physical Exam   Constitutional: She appears well-developed and well-nourished.   HENT:   Head: Normocephalic.   Eyes: Conjunctivae and EOM are normal.   Cardiovascular: Normal rate.    Pulmonary/Chest: Effort normal and breath sounds normal. No respiratory distress. She has no wheezes. She has no rales.   On low flow NC   Abdominal: Soft. Bowel sounds are normal. She exhibits no distension. There is no tenderness. There is no guarding.   Musculoskeletal: Normal range of motion. She exhibits no edema.   Neurological: She is alert.   Still appears encephalopathic. Oriented to place and person, not time   Skin: Skin is warm and dry.   Psychiatric: She has a normal mood and affect. Her behavior is normal. Judgment and thought content normal.   Vitals reviewed.      Significant Labs: All pertinent labs within the past 24 hours have been reviewed.    Magnesium:  No results for input(s): MG in  the last 48 hours.    Significant Imaging: I have reviewed all pertinent imaging results/findings within the past 24 hours.  I have reviewed and interpreted all pertinent imaging results/findings within the past 24 hours.    Assessment/Plan:      Oropharyngeal dysphagia    This is severe. No acute stroke per MRI. Unfortunately NG tube has been removed once SBO appeared to have resolved, and patient does not want it back in. Was actually able to tolerate pureed diet with thin liquids, which was unexpected. GI evaluated patient. PEG no longer needed. I updated GI. TPN discontinued and central line removed. Encouraged PO intake. Continue Pureed diet with thin liquid, strict aspiration precautions. Appreciate further Speech recs           Chronic systolic heart failure    LVEF of 25% and severe aortic stenosis. Repeat 2D Echo on 6/16 showed LVEF of 55%. Continue Medical management at this time. No chest pain or shortness of breath. Stable on low flow nasal cannula. Continue statin and ASA to PO (all meds crushed with puree). Hold off on carvedilol and ACE-I as patient is septic and BP is at goal. Considering ischemic eval as outpatient. Appreciate further cards recs (following PRN)          Gram positive sepsis    Likely due to central line. BCx grew MRSA, sensitive to doxycycline, clindamycin and bactrim. Central line removed. EJ in place. Continue Vanc (dosed based on daily level). Repeat random in AM. Renal panel in AM. Repeat BCx 6/18 so far negative. TTE suggests calcification of mitral leaflet. Review of Echo done on 6/12 showing thickness of mitral valve. Murmur is unchanged and EF is actually better. Hold off on GRIS as patient is currently delirious and may not be a good candidate for this type of intervention. I will consult ID for co-management           Acute renal failure    Recurred after multiple episodes of loose stools that resolved on its own. Renal function improving with fluids. Is hungry and thirsty  but dysphagia limits amount of PO intake. Will continue gentle IVFs for now.           Sinus tachycardia by electrocardiogram    Likely due to relative hypotension. Better after IVFs. BB PRN          Hypoglycemia associated with diabetes    Is symptomatic. Patient delirious. D50 and start D5% infusion. POC BG AC/HS. PO intake with assistance          Acquired hypothyroidism    Change levothyroxine to PO          Nonrheumatic aortic valve stenosis    Severe, per Echo. No acute issues          Cardiomyopathy              Weakness    Lives with daughter. Walks with rolling walker. PT/OT consulted. Recommending SNF           Severe malnutrition    F/u Speech recs. Stop TPN. Continued pureed diet          Facial droop    Acute according to family per note; rash on trunk and thighs. No longer present  Neurology following  MRI- negative. On ASA. Started statin    RPR neg, sed rate and crp high.         Anemia, chronic disease    No overt signs of bleeding / Anemia studies liekly 2/2 renal disease  Anemia studies. B12 and folic acid WNL. Iron in low normal range. Hgb decreased likely due to dilution. Remains unchanged. No signs of bleed.          Elevated troponin I level    Likely 2/2 renal - denies chest pain            VTE Risk Mitigation         Ordered     enoxaparin injection 30 mg  Daily     Route:  Subcutaneous        06/11/17 1055     Medium Risk of VTE  Once      06/11/17 0856     Place sequential compression device  Until discontinued      06/11/17 0856     Place ISHMAEL hose  Until discontinued      06/11/17 0856        Give D50 amp now and start D5% infusion. Hopefully this would improve delirium and patient will be able to eat more. ID consulted for co-management of MRSA bacteremia 2/2 central line    Eri Ortiz MD  Department of Hospital Medicine   [facility] [place]

## 2017-06-20 NOTE — PHYSICIAN QUERY
PT Name: Seema Schreiber  MR #: [id number]    Physician Query Form -Present on Admission (POA) Diagnosis Clarification     CDS/: Jeniffer Nunez RN, CCDS              Contact information: [phone]    This form is a permanent document in the medical record.     Query Date: June 20, 2017    By submitting this query, we are merely seeking further clarification of documentation. Please utilize your independent clinical judgment when addressing the question(s) below.       The Medical record contains the following:    Diagnosis      Supporting Clinical Information   Location in Medical Record   Sepsis MRSA    Gm Positive Sepsis POA=no     ID CN 6/19 Blood cultures from 6/12/17 and 6/13/17 were negative and then turned positive on 6/16/17 for an mrsa with an twyla of 2.     Her tlc was pulled and tip is with no growth.    If blood from 6/18 continues to stay negative, will offer IV abx til 7/2/17   ID CN 6/19    Plan for snf once able to maintain PO intake and treatment for sepsis under control  Prim Pn 6/16    Gram positive sepsis likely due to central line.   BCx growing GPC resembling staph.     Will remove central line now.  Continue Vanc.    Repeat BCx in am.     Hold off on Coreg and ACE-I as pt is septic.  . Prim PN 6/17    Sinus tachycardia likely due to relative hypotension. IV Metoprolol prn.  500 cc Bolus    Fever appears to have subsided after initiating Vancomycin.    Prim PN 6/16    On 6/11 - 12  Temp: 98.4 - 99.1  HR: 133 - 99  RR: 31 - 24  BP: 110/56 - 96/56    On 6/13 - 14  Temp: 101.6 - 99.5  HR: 122 - 83  RR: 32 - 18  BP: 80/56 - 119/55    On 6/15 - 17  Temp: 101.0 - 97.3  HR: 122 - 95  RR: 18 - 28  BP: 88/54 - 124/70    On 6/18 - 20  Temp: 100.6 - 98.1  HR: 107 - 119  RR: 24 - 22  BP: 111/56 - 136/63   VS Flow Sheet    IVF: NS 1 Liter bolus in ED X1 on 6/11  IVF: NS @ 100 ml/hr on 6/11  IVF: D5W @ 125 ml/hr on 6/11 x1    IVF: D5 1/2  ml bolus on 6/12    Cipro IV once on 6/12, 13, 16    IVF: D5  "1L w/KCL 20 meq, sodium bicarb 50 meq @ 100 ml/hr on 6/13 - 15    Vancomycin IV Once on 6/16, 17 & 19 & 20  IV: NS bolus 50 ml on 6/16  IV: NS bolus 1 liter on 6/16    IV: NS bolus 1 Liter on 6/17    IVF: D5W @ 100 ml/hr start 6/19  Vancomycin IV start 6/20   MAR    Right-sided central venous catheter projects to the right of midline. Abd Xray on 6/11 @ 0630    WBC: 9.02 - 11.70  Bands: none reported  Lactate: 1.7 on 6/11 Lab 6/11 - 20     See picture below.                           Scanned media from [facility] Page 20.            Doctor, Please clarify Present On Admission (POA) status of "MRSA Sepsis".    [  ] Present on Admission   [ x ] Not Present on Admission  [  ] Clinically undetermined            "

## 2017-06-20 NOTE — PROGRESS NOTES
Report received from LUIS F Hayden. Visualized patient and assessed patient's overall condition and appearance.   NAD noted. Soft mitten restraints in place. Will continue to monitor.

## 2017-06-20 NOTE — PHYSICIAN QUERY
PT Name: Seema Schreiber  MR #: [id number]    Physician Query Form - Neurological Condition Clarification       CDS/: Jeniffer Nunez RN, CCDS              Contact information: [phone]    This form is a permanent document in the medical record.     Query Date: June 20, 2017    By submitting this query, we are merely seeking further clarification of documentation. Please utilize your independent clinical judgment when addressing the question(s) below.    The Medical record contains the following:   Indicators   Supporting Clinical Findings Location in Medical Record   x AMS, Confusion, LOC, etc. Confused  Lethargic  Change in mental status, confusion Primary PN 6/12-17  Renal CN 6/11; Neuro PN 6/12  ED MD Note     x Acute / Chronic Illness MRSA Sepsis    sbo  Facial droop  ARF  Chronic systolic chf  Anemia of chronic disease likely renal disease  Elevated troponin likely 2/2 renal  Tachycardia 2/2 dehydration   Prim Pn 6/17    H&P    Radiology Findings     x Electrolyte Imbalance Hypernatremia     Medication      Treatment     x Other Still appears encephalopathic Prim PN 6/19     Provider, please specify the diagnosis or diagnoses associated with above clinical findings.      Please specify type of encephalopathy.     [ x ] Metabolic Encephalopathy    [  ] Toxic Encephalopathy    [  ] Other Encephalopathy    [  ] Unspecified Encephalopathy    [  ] Other (please specify): _____________________________________    [  ] Clinically Undetermined      Please document in your progress notes daily for the duration of treatment until resolved, and  include in your discharge summary.

## 2017-06-20 NOTE — PROGRESS NOTES
Notified MD Justice that patient's HR has been in the 130s and patient converted to A Fib. Inquired if an EKG and cardiology consult can be put in. MD states she will put in some orders.

## 2017-06-20 NOTE — PROGRESS NOTES
Patient received on 3L NC with sats of 98% bs- insp/exp wh with rr 26. Duoneb tx given and tolerated with PEPE. Moderated amount thick brown secretion suction from back of throat.

## 2017-06-20 NOTE — NURSING
Code Blue initiated by nursing staff as patient reportedly unresponsive, agonal breathing, without a palpable pulse.

## 2017-06-20 NOTE — PROGRESS NOTES
Report given to LUIS F Hayden. Walking rounds completed. Visualized and assessed patient NAD noted. Safety precautions maintained and call light within reach.    Chart check completed.

## 2017-06-21 NOTE — SIGNIFICANT EVENT
Code Blue    Ms. Schreiber became unresponsive and was found to be pulseless at 8:44am. She underwent multiple rounds of ACLS with 5 rounds of epinephrine and 2 injections of bicarb. Her family was contacted and informed that she had lost pulses, and despite maximal efforts pulse was not returning. Time of death was called at 9:04 after 20 min of ACLS protocol.

## 2017-06-21 NOTE — DISCHARGE SUMMARY
"[facility] [place] [facility]  Discharge Summary      Patient Name: Seema Schreiber  MRN: [id number]  Admission Date: 6/11/2017  Hospital Length of Stay: 9 days  Discharge Date and Time: 6/20/2017 12:59 PM  Attending Physician: No att. providers found   Discharging Provider: Marla Justice MD  Primary Care Provider: Dajuan Guthrie MD      HPI:   Seema Schreiber is a 86 y.o. AAF with history of MI, COPD? And CHF? who presented to [facility] from [facility]. According to documentation she was transferred for evaluation of confirmed stroke and small bowel obstruction as seen on CT at [facility]. Per EMS, pt had L-sided facial drooping and altered mental status.     Daughters initially brought pt to [place] for increased confusion (x1 day), decreased appetite (x4 days), and weakness. Pt is normally able to ambulate by herself but has been unable to these past few days. Daughters deny emesis.   Patient is awake and alert at the time of interview, denies constipation or emesis states, "I keep harking like I want to throw up". She reports last BM as yesterday, says it was formed and normal. She denies any chronic pain issues or chronic use of narcotics. Of note patient has well distributed macular papular rash on BL thighs and trunk. States her PCP told her that she had the mumps about 1 month ago, she tells Ms. Reveles, "they said they can't do nothing about it".    Patient's labs reveal hypernatremia, dehydration with acute on chronic renal failure III. Her physical assessment is negative for acute bowel but bowel sounds completely absent. She has NG tube in place and denies NV abdominal pain or diarrhea. There is slight L facial drooping with slight blinking eyelid delay.    Hospital Course:   The patient was admitted to the hospital for eval and treatment of a small bowel obstruction, left facial droop, hypovolemic hypernatremia and pre-renal SAVITA (Cr 4). For an unknown reason, the " patient was initially sent to the ICU but sent out the same day. MRI of her head was negative for any acute change. Per labs, patient also noted to be hypothyroid (TSH 7.4, free T4 0.8) and mild troponemia. 2D Echo revealed severe aortic valve stenosis and LVEF 25%. Per daughter, this is an old issue. Initiated on rectal ASA, howeve unable to start on BB or stating due to SBO at the time. Chest pain free. Troponemia likely demand and underlying renal dysfunction. Surgery, cardiology and nephrology consulted. NG tube placed to suction, empiric cipro/flagyl and initiated on IVFs. Small obstruction and hypernatremia eventually resolved, however severe dysphagia was noted to be an issue and is persistent. MRI of brain without acute stroke, but rather chronic cerebrovascular disease. SAVITA also improved with IVFs (gently infused given Syst HF) NG tube had already been removed by surgery. Patient insisted on not putting NG tube back in despite dysphagia. I suggested starting tube feeds but patient would like to continue with TPN. GI consulted for PEG on 6/16. Surprisingly, patient was able to properly eat pureed diet with thin liquids. Plans for PEG placement canceled. Developed fever. Blood culture 2/4 bottles with MRSA. Right groin central line removed and a right EJ (18G) placed. TPN discontinued. Fevers resolved and hemodynamically improved soon after. Repeat BCx 6/18 negative. On vancomycin dosed per daily levels. Then developed multiple loose stools (green), which resolved on its own. SAVITA afterwards. Improved with normal saline infusion. PO intake inconsistent. Hypoglycemic and symptomatic. Was started on D5% infusion (normal saline caused mild hypernatremia and hyperchloremic metabolic acidosis). On the morning of 6/20/2017 she was noted to be tachycardic and then developed coughing. Nursing presented to bedside to assess the patient. She then became unresponsive. She was pulseless. ACLS protocol was initiated. She  underwent ACLS for 20 minutes to no avail. Time of death was called at 9:04. When she was intubated, a large tissue plug of mucus and dried blood first had to be suctioned. It is believe she first became hypoxic resulting in her death.     Consults:   Consults         Status Ordering Provider     Inpatient consult to Cardiology  Once     Provider:  Thang Turner MD    Completed BRIAN COOPER     Inpatient consult to Infectious Diseases  Once     Provider:  Yoanna Smith MD    Completed NIKOLAY BIRCH     Inpatient consult to Nephrology  Once     Provider:  Talita Chin MD    Completed VAISHALI JIMENEZ     Inpatient consult to Neurology  Once     Provider:  James Alfred MD    Completed JENNI DURAN     IP consult to dietary  Once     Provider:  (Not yet assigned)    Completed MARION STALEY        Pending Diagnostic Studies:     Procedure Component Value Units Date/Time    EKG 12-lead [[id number]]     Order Status:  Sent Lab Status:  No result         Final Active Diagnoses:    Diagnosis Date Noted POA    Hypoglycemia associated with diabetes [E11.649] 06/19/2017 Yes    Gram positive sepsis [A41.89] 06/17/2017 No    Sinus tachycardia by electrocardiogram [R00.0] 06/16/2017 Yes    Oropharyngeal dysphagia [R13.12] 06/14/2017 Yes    Acquired hypothyroidism [E03.9] 06/14/2017 Yes    Cardiomyopathy [I42.9] 06/13/2017 Yes    Nonrheumatic aortic valve stenosis [I35.0] 06/13/2017 Yes    Severe malnutrition [E43] 06/12/2017 Yes    Weakness [R53.1] 06/12/2017 Yes    Acute renal failure [N17.9] 06/11/2017 Yes    Chronic systolic heart failure [I50.22] 06/11/2017 Yes    Elevated troponin I level [R74.8] 06/11/2017 Yes    Anemia, chronic disease [D63.8] 06/11/2017 Yes    Facial droop [R29.810] 06/11/2017 Yes      Problems Resolved During this Admission:    Diagnosis Date Noted Date Resolved POA    PRINCIPAL PROBLEM:  Small bowel obstruction [K56.69] 06/11/2017 06/18/2017 Yes    Low  grade fever [R50.9] 2017 Yes    Hypernatremia [E87.0] 2017 Yes    Tachycardia [R00.0] 2017 Yes        Discharged Condition:     Disposition:     Follow Up:    Patient Instructions:   No discharge procedures on file.  Medications:  None (patient  at medical facility)  Time spent on the discharge of patient: 60 minutes    Marla Justice MD  Department of Hospital Medicine  [facility]

## 2017-06-23 LAB
BACTERIA BLD CULT: NORMAL
BACTERIA BLD CULT: NORMAL
